# Patient Record
Sex: FEMALE | Race: WHITE | URBAN - METROPOLITAN AREA
[De-identification: names, ages, dates, MRNs, and addresses within clinical notes are randomized per-mention and may not be internally consistent; named-entity substitution may affect disease eponyms.]

---

## 2018-10-03 ENCOUNTER — HOSPITAL ENCOUNTER (OUTPATIENT)
Dept: LAB | Age: 38
Discharge: HOME | End: 2018-10-03
Payer: COMMERCIAL

## 2018-10-03 PROCEDURE — 88311 DECALCIFY TISSUE: CPT | Performed by: OTOLARYNGOLOGY

## 2018-10-03 PROCEDURE — 88305 TISSUE EXAM BY PATHOLOGIST: CPT | Performed by: OTOLARYNGOLOGY

## 2024-06-01 ENCOUNTER — HOSPITAL ENCOUNTER (INPATIENT)
Facility: CLINIC | Age: 44
LOS: 7 days | Discharge: HOME | DRG: 871 | End: 2024-06-08
Attending: EMERGENCY MEDICINE | Admitting: INTERNAL MEDICINE

## 2024-06-01 ENCOUNTER — APPOINTMENT (OUTPATIENT)
Dept: CT IMAGING | Facility: CLINIC | Age: 44
DRG: 871 | End: 2024-06-01
Attending: EMERGENCY MEDICINE

## 2024-06-01 ENCOUNTER — APPOINTMENT (OUTPATIENT)
Dept: ULTRASOUND IMAGING | Facility: CLINIC | Age: 44
DRG: 871 | End: 2024-06-01

## 2024-06-01 DIAGNOSIS — G89.29 CHRONIC ABDOMINAL PAIN: ICD-10-CM

## 2024-06-01 DIAGNOSIS — B37.31 CANDIDAL VULVOVAGINITIS: ICD-10-CM

## 2024-06-01 DIAGNOSIS — N39.0 UPPER URINARY TRACT INFECTION: ICD-10-CM

## 2024-06-01 DIAGNOSIS — A41.9 SEPTIC SHOCK (H): ICD-10-CM

## 2024-06-01 DIAGNOSIS — B96.89 BACTERIAL VAGINITIS: ICD-10-CM

## 2024-06-01 DIAGNOSIS — F32.A DEPRESSION, UNSPECIFIED DEPRESSION TYPE: ICD-10-CM

## 2024-06-01 DIAGNOSIS — R10.84 ABDOMINAL PAIN, GENERALIZED: ICD-10-CM

## 2024-06-01 DIAGNOSIS — K59.09 CHRONIC CONSTIPATION: Primary | ICD-10-CM

## 2024-06-01 DIAGNOSIS — F41.9 ANXIETY: ICD-10-CM

## 2024-06-01 DIAGNOSIS — R65.21 SEPSIS DUE TO ESCHERICHIA COLI WITH ACUTE RENAL FAILURE AND SEPTIC SHOCK, UNSPECIFIED ACUTE RENAL FAILURE TYPE (H): ICD-10-CM

## 2024-06-01 DIAGNOSIS — R10.9 CHRONIC ABDOMINAL PAIN: ICD-10-CM

## 2024-06-01 DIAGNOSIS — R65.21 SEPTIC SHOCK (H): ICD-10-CM

## 2024-06-01 DIAGNOSIS — F43.10 PTSD (POST-TRAUMATIC STRESS DISORDER): ICD-10-CM

## 2024-06-01 DIAGNOSIS — N17.9 AKI (ACUTE KIDNEY INJURY) (H): ICD-10-CM

## 2024-06-01 DIAGNOSIS — R93.3 ABNORMAL CT SCAN, GASTROINTESTINAL TRACT: ICD-10-CM

## 2024-06-01 DIAGNOSIS — J45.20 MILD INTERMITTENT ASTHMA WITHOUT COMPLICATION: ICD-10-CM

## 2024-06-01 DIAGNOSIS — K64.4 EXTERNAL HEMORRHOIDS: ICD-10-CM

## 2024-06-01 DIAGNOSIS — A41.51 SEPSIS DUE TO ESCHERICHIA COLI WITH ACUTE RENAL FAILURE AND SEPTIC SHOCK, UNSPECIFIED ACUTE RENAL FAILURE TYPE (H): ICD-10-CM

## 2024-06-01 DIAGNOSIS — N76.0 BACTERIAL VAGINITIS: ICD-10-CM

## 2024-06-01 DIAGNOSIS — N17.9 SEPSIS DUE TO ESCHERICHIA COLI WITH ACUTE RENAL FAILURE AND SEPTIC SHOCK, UNSPECIFIED ACUTE RENAL FAILURE TYPE (H): ICD-10-CM

## 2024-06-01 LAB
ALBUMIN SERPL BCG-MCNC: 3 G/DL (ref 3.5–5.2)
ALBUMIN SERPL BCG-MCNC: 3.2 G/DL (ref 3.5–5.2)
ALBUMIN UR-MCNC: 100 MG/DL
ALLEN'S TEST: ABNORMAL
ALP SERPL-CCNC: 64 U/L (ref 40–150)
ALP SERPL-CCNC: 87 U/L (ref 40–150)
ALT SERPL W P-5'-P-CCNC: 66 U/L (ref 0–50)
ALT SERPL W P-5'-P-CCNC: 70 U/L (ref 0–50)
ANION GAP SERPL CALCULATED.3IONS-SCNC: 11 MMOL/L (ref 7–15)
ANION GAP SERPL CALCULATED.3IONS-SCNC: 11 MMOL/L (ref 7–15)
APPEARANCE UR: ABNORMAL
APTT PPP: 37 SECONDS (ref 22–38)
AST SERPL W P-5'-P-CCNC: 74 U/L (ref 0–45)
AST SERPL W P-5'-P-CCNC: 75 U/L (ref 0–45)
BASE EXCESS BLDA CALC-SCNC: -3.8 MMOL/L (ref -3–3)
BASE EXCESS BLDV CALC-SCNC: -2.1 MMOL/L (ref -3–3)
BASE EXCESS BLDV CALC-SCNC: -4 MMOL/L (ref -3–3)
BASE EXCESS BLDV CALC-SCNC: -4 MMOL/L (ref -3–3)
BASOPHILS # BLD AUTO: ABNORMAL 10*3/UL
BASOPHILS # BLD MANUAL: 0 10E3/UL (ref 0–0.2)
BASOPHILS NFR BLD AUTO: ABNORMAL %
BASOPHILS NFR BLD MANUAL: 0 %
BILIRUB SERPL-MCNC: 0.3 MG/DL
BILIRUB SERPL-MCNC: 0.4 MG/DL
BILIRUB UR QL STRIP: NEGATIVE
BUN SERPL-MCNC: 29.6 MG/DL (ref 6–20)
BUN SERPL-MCNC: 30.5 MG/DL (ref 6–20)
CA-I BLD-MCNC: 4.7 MG/DL (ref 4.4–5.2)
CALCIUM SERPL-MCNC: 8 MG/DL (ref 8.6–10)
CALCIUM SERPL-MCNC: 8.2 MG/DL (ref 8.6–10)
CHLORIDE SERPL-SCNC: 101 MMOL/L (ref 98–107)
CHLORIDE SERPL-SCNC: 105 MMOL/L (ref 98–107)
COHGB MFR BLD: 94.8 % (ref 96–97)
COLOR UR AUTO: YELLOW
CPB POCT: NO
CREAT BLD-MCNC: 3.2 MG/DL (ref 0.5–1)
CREAT SERPL-MCNC: 2.45 MG/DL (ref 0.51–0.95)
CREAT SERPL-MCNC: 2.61 MG/DL (ref 0.51–0.95)
CREAT SERPL-MCNC: 3.12 MG/DL (ref 0.51–0.95)
DEPRECATED HCO3 PLAS-SCNC: 18 MMOL/L (ref 22–29)
DEPRECATED HCO3 PLAS-SCNC: 21 MMOL/L (ref 22–29)
EGFRCR SERPLBLD CKD-EPI 2021: 18 ML/MIN/1.73M2
EGFRCR SERPLBLD CKD-EPI 2021: 18 ML/MIN/1.73M2
EGFRCR SERPLBLD CKD-EPI 2021: 23 ML/MIN/1.73M2
EGFRCR SERPLBLD CKD-EPI 2021: 24 ML/MIN/1.73M2
EOSINOPHIL # BLD AUTO: ABNORMAL 10*3/UL
EOSINOPHIL # BLD MANUAL: 0 10E3/UL (ref 0–0.7)
EOSINOPHIL NFR BLD AUTO: ABNORMAL %
EOSINOPHIL NFR BLD MANUAL: 0 %
ERYTHROCYTE [DISTWIDTH] IN BLOOD BY AUTOMATED COUNT: 13.4 % (ref 10–15)
GLUCOSE BLD-MCNC: 95 MG/DL (ref 70–99)
GLUCOSE BLDC GLUCOMTR-MCNC: 106 MG/DL (ref 70–99)
GLUCOSE BLDC GLUCOMTR-MCNC: 88 MG/DL (ref 70–99)
GLUCOSE BLDC GLUCOMTR-MCNC: 90 MG/DL (ref 70–99)
GLUCOSE SERPL-MCNC: 103 MG/DL (ref 70–99)
GLUCOSE SERPL-MCNC: 107 MG/DL (ref 70–99)
GLUCOSE UR STRIP-MCNC: NEGATIVE MG/DL
HCG SERPL QL: NEGATIVE
HCO3 BLD-SCNC: 20 MMOL/L (ref 21–28)
HCO3 BLDV-SCNC: 22 MMOL/L (ref 21–28)
HCO3 BLDV-SCNC: 23 MMOL/L (ref 21–28)
HCO3 BLDV-SCNC: 24 MMOL/L (ref 21–28)
HCT VFR BLD AUTO: 32.7 % (ref 35–47)
HCT VFR BLD CALC: 32 % (ref 35–47)
HGB BLD-MCNC: 10.9 G/DL (ref 11.7–15.7)
HGB BLD-MCNC: 11.1 G/DL (ref 11.7–15.7)
HGB UR QL STRIP: ABNORMAL
IMM GRANULOCYTES # BLD: ABNORMAL 10*3/UL
IMM GRANULOCYTES NFR BLD: ABNORMAL %
INR PPP: 1.47 (ref 0.85–1.15)
KETONES UR STRIP-MCNC: NEGATIVE MG/DL
LACTATE BLD-SCNC: 1.8 MMOL/L
LACTATE SERPL-SCNC: 1.9 MMOL/L (ref 0.7–2)
LEUKOCYTE ESTERASE UR QL STRIP: ABNORMAL
LIPASE SERPL-CCNC: 11 U/L (ref 13–60)
LYMPHOCYTES # BLD AUTO: ABNORMAL 10*3/UL
LYMPHOCYTES # BLD MANUAL: 0 10E3/UL (ref 0.8–5.3)
LYMPHOCYTES NFR BLD AUTO: ABNORMAL %
LYMPHOCYTES NFR BLD MANUAL: 0 %
MCH RBC QN AUTO: 30.6 PG (ref 26.5–33)
MCHC RBC AUTO-ENTMCNC: 33.9 G/DL (ref 31.5–36.5)
MCV RBC AUTO: 90 FL (ref 78–100)
MONOCYTES # BLD AUTO: ABNORMAL 10*3/UL
MONOCYTES # BLD MANUAL: 1.2 10E3/UL (ref 0–1.3)
MONOCYTES NFR BLD AUTO: ABNORMAL %
MONOCYTES NFR BLD MANUAL: 4 %
MRSA DNA SPEC QL NAA+PROBE: NEGATIVE
NEUTROPHILS # BLD AUTO: ABNORMAL 10*3/UL
NEUTROPHILS # BLD MANUAL: 28.4 10E3/UL (ref 1.6–8.3)
NEUTROPHILS NFR BLD AUTO: ABNORMAL %
NEUTROPHILS NFR BLD MANUAL: 96 %
NITRATE UR QL: NEGATIVE
NRBC # BLD AUTO: 0 10E3/UL
NRBC BLD AUTO-RTO: 0 /100
NT-PROBNP SERPL-MCNC: 1356 PG/ML (ref 0–450)
O2/TOTAL GAS SETTING VFR VENT: 2 %
O2/TOTAL GAS SETTING VFR VENT: 21 %
OXYHGB MFR BLDV: 42 % (ref 70–75)
PCO2 BLD: 32 MM HG (ref 35–45)
PCO2 BLDV: 43 MM HG (ref 40–50)
PCO2 BLDV: 46 MM HG (ref 40–50)
PCO2 BLDV: 47 MM HG (ref 40–50)
PH BLD: 7.4 [PH] (ref 7.35–7.45)
PH BLDV: 7.29 [PH] (ref 7.32–7.43)
PH BLDV: 7.31 [PH] (ref 7.32–7.43)
PH BLDV: 7.33 [PH] (ref 7.32–7.43)
PH UR STRIP: 6 [PH] (ref 5–7)
PLAT MORPH BLD: ABNORMAL
PLATELET # BLD AUTO: 142 10E3/UL (ref 150–450)
PO2 BLD: 67 MM HG (ref 80–105)
PO2 BLDV: 25 MM HG (ref 25–47)
PO2 BLDV: 25 MM HG (ref 25–47)
PO2 BLDV: 26 MM HG (ref 25–47)
POTASSIUM BLD-SCNC: 3.9 MMOL/L (ref 3.4–5.3)
POTASSIUM SERPL-SCNC: 3.9 MMOL/L (ref 3.4–5.3)
POTASSIUM SERPL-SCNC: 4.2 MMOL/L (ref 3.4–5.3)
PROCALCITONIN SERPL IA-MCNC: 84.7 NG/ML
PROT SERPL-MCNC: 5.1 G/DL (ref 6.4–8.3)
PROT SERPL-MCNC: 5.4 G/DL (ref 6.4–8.3)
RBC # BLD AUTO: 3.63 10E6/UL (ref 3.8–5.2)
RBC MORPH BLD: ABNORMAL
RBC URINE: 16 /HPF
SA TARGET DNA: NEGATIVE
SAO2 % BLDA: 93 % (ref 92–100)
SAO2 % BLDV: 39 % (ref 70–75)
SAO2 % BLDV: 42 % (ref 70–75)
SAO2 % BLDV: 42.9 % (ref 70–75)
SODIUM BLD-SCNC: 134 MMOL/L (ref 135–145)
SODIUM SERPL-SCNC: 133 MMOL/L (ref 135–145)
SODIUM SERPL-SCNC: 134 MMOL/L (ref 135–145)
SP GR UR STRIP: 1.01 (ref 1–1.03)
SQUAMOUS EPITHELIAL: 5 /HPF
TRANSITIONAL EPI: 1 /HPF
TROPONIN T BLD-MCNC: 0.01 UG/L
TROPONIN T SERPL HS-MCNC: 10 NG/L
UROBILINOGEN UR STRIP-MCNC: NORMAL MG/DL
WBC # BLD AUTO: 29.6 10E3/UL (ref 4–11)
WBC URINE: 63 /HPF

## 2024-06-01 PROCEDURE — 84155 ASSAY OF PROTEIN SERUM: CPT

## 2024-06-01 PROCEDURE — 82805 BLOOD GASES W/O2 SATURATION: CPT | Performed by: EMERGENCY MEDICINE

## 2024-06-01 PROCEDURE — 04HY32Z INSERTION OF MONITORING DEVICE INTO LOWER ARTERY, PERCUTANEOUS APPROACH: ICD-10-PCS | Performed by: EMERGENCY MEDICINE

## 2024-06-01 PROCEDURE — 250N000013 HC RX MED GY IP 250 OP 250 PS 637

## 2024-06-01 PROCEDURE — 86704 HEP B CORE ANTIBODY TOTAL: CPT

## 2024-06-01 PROCEDURE — 82565 ASSAY OF CREATININE: CPT

## 2024-06-01 PROCEDURE — 76830 TRANSVAGINAL US NON-OB: CPT

## 2024-06-01 PROCEDURE — 258N000003 HC RX IP 258 OP 636: Performed by: EMERGENCY MEDICINE

## 2024-06-01 PROCEDURE — 76856 US EXAM PELVIC COMPLETE: CPT

## 2024-06-01 PROCEDURE — 36556 INSERT NON-TUNNEL CV CATH: CPT | Mod: 59 | Performed by: EMERGENCY MEDICINE

## 2024-06-01 PROCEDURE — 76705 ECHO EXAM OF ABDOMEN: CPT | Mod: 26 | Performed by: EMERGENCY MEDICINE

## 2024-06-01 PROCEDURE — 81001 URINALYSIS AUTO W/SCOPE: CPT | Performed by: EMERGENCY MEDICINE

## 2024-06-01 PROCEDURE — 250N000009 HC RX 250: Performed by: EMERGENCY MEDICINE

## 2024-06-01 PROCEDURE — 74176 CT ABD & PELVIS W/O CONTRAST: CPT

## 2024-06-01 PROCEDURE — 3E043XZ INTRODUCTION OF VASOPRESSOR INTO CENTRAL VEIN, PERCUTANEOUS APPROACH: ICD-10-PCS | Performed by: EMERGENCY MEDICINE

## 2024-06-01 PROCEDURE — 99291 CRITICAL CARE FIRST HOUR: CPT | Mod: GC | Performed by: INTERNAL MEDICINE

## 2024-06-01 PROCEDURE — 87086 URINE CULTURE/COLONY COUNT: CPT | Performed by: EMERGENCY MEDICINE

## 2024-06-01 PROCEDURE — 85730 THROMBOPLASTIN TIME PARTIAL: CPT | Performed by: EMERGENCY MEDICINE

## 2024-06-01 PROCEDURE — 84484 ASSAY OF TROPONIN QUANT: CPT

## 2024-06-01 PROCEDURE — 250N000011 HC RX IP 250 OP 636: Performed by: EMERGENCY MEDICINE

## 2024-06-01 PROCEDURE — 84703 CHORIONIC GONADOTROPIN ASSAY: CPT

## 2024-06-01 PROCEDURE — 36620 INSERTION CATHETER ARTERY: CPT | Performed by: EMERGENCY MEDICINE

## 2024-06-01 PROCEDURE — 86706 HEP B SURFACE ANTIBODY: CPT

## 2024-06-01 PROCEDURE — 76830 TRANSVAGINAL US NON-OB: CPT | Mod: 26 | Performed by: RADIOLOGY

## 2024-06-01 PROCEDURE — 250N000011 HC RX IP 250 OP 636: Mod: JZ

## 2024-06-01 PROCEDURE — 85007 BL SMEAR W/DIFF WBC COUNT: CPT | Performed by: EMERGENCY MEDICINE

## 2024-06-01 PROCEDURE — 99253 IP/OBS CNSLTJ NEW/EST LOW 45: CPT | Mod: 4UV | Performed by: SURGERY

## 2024-06-01 PROCEDURE — 99292 CRITICAL CARE ADDL 30 MIN: CPT | Performed by: EMERGENCY MEDICINE

## 2024-06-01 PROCEDURE — 86780 TREPONEMA PALLIDUM: CPT

## 2024-06-01 PROCEDURE — 87186 SC STD MICRODIL/AGAR DIL: CPT | Performed by: EMERGENCY MEDICINE

## 2024-06-01 PROCEDURE — 200N000002 HC R&B ICU UMMC

## 2024-06-01 PROCEDURE — 99291 CRITICAL CARE FIRST HOUR: CPT | Mod: 25 | Performed by: EMERGENCY MEDICINE

## 2024-06-01 PROCEDURE — 999N000128 HC STATISTIC PERIPHERAL IV START W/O US GUIDANCE

## 2024-06-01 PROCEDURE — 76856 US EXAM PELVIC COMPLETE: CPT | Mod: 26 | Performed by: RADIOLOGY

## 2024-06-01 PROCEDURE — 93005 ELECTROCARDIOGRAM TRACING: CPT | Performed by: EMERGENCY MEDICINE

## 2024-06-01 PROCEDURE — 83605 ASSAY OF LACTIC ACID: CPT | Performed by: EMERGENCY MEDICINE

## 2024-06-01 PROCEDURE — 83690 ASSAY OF LIPASE: CPT | Performed by: EMERGENCY MEDICINE

## 2024-06-01 PROCEDURE — 85027 COMPLETE CBC AUTOMATED: CPT | Performed by: EMERGENCY MEDICINE

## 2024-06-01 PROCEDURE — 85610 PROTHROMBIN TIME: CPT | Performed by: EMERGENCY MEDICINE

## 2024-06-01 PROCEDURE — 36415 COLL VENOUS BLD VENIPUNCTURE: CPT | Performed by: EMERGENCY MEDICINE

## 2024-06-01 PROCEDURE — 82803 BLOOD GASES ANY COMBINATION: CPT

## 2024-06-01 PROCEDURE — 74176 CT ABD & PELVIS W/O CONTRAST: CPT | Mod: 26 | Performed by: RADIOLOGY

## 2024-06-01 PROCEDURE — 84484 ASSAY OF TROPONIN QUANT: CPT | Performed by: EMERGENCY MEDICINE

## 2024-06-01 PROCEDURE — 84145 PROCALCITONIN (PCT): CPT | Performed by: EMERGENCY MEDICINE

## 2024-06-01 PROCEDURE — 82962 GLUCOSE BLOOD TEST: CPT

## 2024-06-01 PROCEDURE — 76705 ECHO EXAM OF ABDOMEN: CPT | Performed by: EMERGENCY MEDICINE

## 2024-06-01 PROCEDURE — 82947 ASSAY GLUCOSE BLOOD QUANT: CPT | Performed by: EMERGENCY MEDICINE

## 2024-06-01 PROCEDURE — 87149 DNA/RNA DIRECT PROBE: CPT | Performed by: EMERGENCY MEDICINE

## 2024-06-01 PROCEDURE — 83880 ASSAY OF NATRIURETIC PEPTIDE: CPT | Performed by: EMERGENCY MEDICINE

## 2024-06-01 PROCEDURE — 93010 ELECTROCARDIOGRAM REPORT: CPT | Mod: 59 | Performed by: EMERGENCY MEDICINE

## 2024-06-01 PROCEDURE — 87641 MR-STAPH DNA AMP PROBE: CPT

## 2024-06-01 PROCEDURE — 82330 ASSAY OF CALCIUM: CPT

## 2024-06-01 RX ORDER — POLYETHYLENE GLYCOL 3350 17 G/17G
17 POWDER, FOR SOLUTION ORAL DAILY PRN
Status: DISCONTINUED | OUTPATIENT
Start: 2024-06-01 | End: 2024-06-03

## 2024-06-01 RX ORDER — AMOXICILLIN 250 MG
2 CAPSULE ORAL 2 TIMES DAILY PRN
Status: DISCONTINUED | OUTPATIENT
Start: 2024-06-01 | End: 2024-06-03

## 2024-06-01 RX ORDER — VANCOMYCIN HYDROCHLORIDE 125 MG/1
125 CAPSULE ORAL 4 TIMES DAILY
Status: DISCONTINUED | OUTPATIENT
Start: 2024-06-01 | End: 2024-06-02

## 2024-06-01 RX ORDER — PIPERACILLIN SODIUM, TAZOBACTAM SODIUM 2; .25 G/10ML; G/10ML
2.25 INJECTION, POWDER, LYOPHILIZED, FOR SOLUTION INTRAVENOUS EVERY 6 HOURS
Status: DISCONTINUED | OUTPATIENT
Start: 2024-06-01 | End: 2024-06-01

## 2024-06-01 RX ORDER — ENOXAPARIN SODIUM 100 MG/ML
30 INJECTION SUBCUTANEOUS EVERY 24 HOURS
Status: DISCONTINUED | OUTPATIENT
Start: 2024-06-01 | End: 2024-06-01

## 2024-06-01 RX ORDER — LORAZEPAM 2 MG/ML
2 INJECTION INTRAMUSCULAR EVERY 8 HOURS PRN
Status: DISCONTINUED | OUTPATIENT
Start: 2024-06-01 | End: 2024-06-06

## 2024-06-01 RX ORDER — ACETAMINOPHEN 325 MG/1
975 TABLET ORAL EVERY 8 HOURS PRN
Status: DISCONTINUED | OUTPATIENT
Start: 2024-06-01 | End: 2024-06-03

## 2024-06-01 RX ORDER — NALOXONE HYDROCHLORIDE 0.4 MG/ML
0.4 INJECTION, SOLUTION INTRAMUSCULAR; INTRAVENOUS; SUBCUTANEOUS
Status: DISCONTINUED | OUTPATIENT
Start: 2024-06-01 | End: 2024-06-08 | Stop reason: HOSPADM

## 2024-06-01 RX ORDER — NOREPINEPHRINE BITARTRATE 0.06 MG/ML
.01-.6 INJECTION, SOLUTION INTRAVENOUS CONTINUOUS
Status: DISCONTINUED | OUTPATIENT
Start: 2024-06-01 | End: 2024-06-01

## 2024-06-01 RX ORDER — PIPERACILLIN SODIUM, TAZOBACTAM SODIUM 4; .5 G/20ML; G/20ML
4.5 INJECTION, POWDER, LYOPHILIZED, FOR SOLUTION INTRAVENOUS ONCE
Status: COMPLETED | OUTPATIENT
Start: 2024-06-01 | End: 2024-06-01

## 2024-06-01 RX ORDER — NALOXONE HYDROCHLORIDE 0.4 MG/ML
0.2 INJECTION, SOLUTION INTRAMUSCULAR; INTRAVENOUS; SUBCUTANEOUS
Status: DISCONTINUED | OUTPATIENT
Start: 2024-06-01 | End: 2024-06-08 | Stop reason: HOSPADM

## 2024-06-01 RX ORDER — ALBUTEROL SULFATE 90 UG/1
2 AEROSOL, METERED RESPIRATORY (INHALATION) EVERY 6 HOURS PRN
Status: DISCONTINUED | OUTPATIENT
Start: 2024-06-01 | End: 2024-06-08 | Stop reason: HOSPADM

## 2024-06-01 RX ORDER — LIDOCAINE 4 G/G
1 PATCH TOPICAL
Status: DISCONTINUED | OUTPATIENT
Start: 2024-06-01 | End: 2024-06-08 | Stop reason: HOSPADM

## 2024-06-01 RX ORDER — HEPARIN SODIUM 5000 [USP'U]/.5ML
5000 INJECTION, SOLUTION INTRAVENOUS; SUBCUTANEOUS EVERY 8 HOURS
Status: DISCONTINUED | OUTPATIENT
Start: 2024-06-01 | End: 2024-06-06

## 2024-06-01 RX ORDER — NICOTINE POLACRILEX 4 MG
15-30 LOZENGE BUCCAL
Status: DISCONTINUED | OUTPATIENT
Start: 2024-06-01 | End: 2024-06-08 | Stop reason: HOSPADM

## 2024-06-01 RX ORDER — PIPERACILLIN SODIUM, TAZOBACTAM SODIUM 2; .25 G/10ML; G/10ML
2.25 INJECTION, POWDER, LYOPHILIZED, FOR SOLUTION INTRAVENOUS EVERY 6 HOURS
Status: DISCONTINUED | OUTPATIENT
Start: 2024-06-01 | End: 2024-06-02

## 2024-06-01 RX ORDER — ACETAMINOPHEN 500 MG
1000 TABLET ORAL ONCE
Status: COMPLETED | OUTPATIENT
Start: 2024-06-01 | End: 2024-06-01

## 2024-06-01 RX ORDER — AMOXICILLIN 250 MG
1 CAPSULE ORAL 2 TIMES DAILY PRN
Status: DISCONTINUED | OUTPATIENT
Start: 2024-06-01 | End: 2024-06-03

## 2024-06-01 RX ORDER — DEXTROSE MONOHYDRATE 25 G/50ML
25-50 INJECTION, SOLUTION INTRAVENOUS
Status: DISCONTINUED | OUTPATIENT
Start: 2024-06-01 | End: 2024-06-08 | Stop reason: HOSPADM

## 2024-06-01 RX ORDER — HYDROMORPHONE HCL IN WATER/PF 6 MG/30 ML
.2-.5 PATIENT CONTROLLED ANALGESIA SYRINGE INTRAVENOUS
Status: DISCONTINUED | OUTPATIENT
Start: 2024-06-01 | End: 2024-06-03

## 2024-06-01 RX ORDER — METRONIDAZOLE 500 MG/100ML
500 INJECTION, SOLUTION INTRAVENOUS EVERY 12 HOURS
Status: DISCONTINUED | OUTPATIENT
Start: 2024-06-01 | End: 2024-06-02

## 2024-06-01 RX ADMIN — HYDROMORPHONE HYDROCHLORIDE 0.2 MG: 0.2 INJECTION, SOLUTION INTRAMUSCULAR; INTRAVENOUS; SUBCUTANEOUS at 22:34

## 2024-06-01 RX ADMIN — SODIUM CHLORIDE 1000 ML: 9 INJECTION, SOLUTION INTRAVENOUS at 12:22

## 2024-06-01 RX ADMIN — PIPERACILLIN SODIUM AND TAZOBACTAM SODIUM 2.25 G: 2; .25 INJECTION, POWDER, LYOPHILIZED, FOR SOLUTION INTRAVENOUS at 18:46

## 2024-06-01 RX ADMIN — HEPARIN SODIUM 5000 UNITS: 5000 INJECTION, SOLUTION INTRAVENOUS; SUBCUTANEOUS at 17:40

## 2024-06-01 RX ADMIN — HEPARIN SODIUM 5000 UNITS: 5000 INJECTION, SOLUTION INTRAVENOUS; SUBCUTANEOUS at 23:57

## 2024-06-01 RX ADMIN — PIPERACILLIN AND TAZOBACTAM 4.5 G: 4; .5 INJECTION, POWDER, LYOPHILIZED, FOR SOLUTION INTRAVENOUS at 12:16

## 2024-06-01 RX ADMIN — VANCOMYCIN HYDROCHLORIDE 125 MG: 125 CAPSULE ORAL at 17:43

## 2024-06-01 RX ADMIN — PIPERACILLIN SODIUM AND TAZOBACTAM SODIUM 2.25 G: 2; .25 INJECTION, POWDER, LYOPHILIZED, FOR SOLUTION INTRAVENOUS at 23:55

## 2024-06-01 RX ADMIN — LIDOCAINE 4% 1 PATCH: 40 PATCH TOPICAL at 22:31

## 2024-06-01 RX ADMIN — SODIUM CHLORIDE, POTASSIUM CHLORIDE, SODIUM LACTATE AND CALCIUM CHLORIDE 1836 ML: 600; 310; 30; 20 INJECTION, SOLUTION INTRAVENOUS at 12:15

## 2024-06-01 RX ADMIN — VANCOMYCIN HYDROCHLORIDE 125 MG: 125 CAPSULE ORAL at 21:35

## 2024-06-01 RX ADMIN — METRONIDAZOLE 500 MG: 500 INJECTION, SOLUTION INTRAVENOUS at 17:35

## 2024-06-01 RX ADMIN — NOREPINEPHRINE BITARTRATE 0.03 MCG/KG/MIN: 1 SOLUTION INTRAVENOUS at 13:35

## 2024-06-01 RX ADMIN — VANCOMYCIN HYDROCHLORIDE 1250 MG: 10 INJECTION, POWDER, LYOPHILIZED, FOR SOLUTION INTRAVENOUS at 12:26

## 2024-06-01 ASSESSMENT — ACTIVITIES OF DAILY LIVING (ADL)
ADLS_ACUITY_SCORE: 37
ADLS_ACUITY_SCORE: 35
ADLS_ACUITY_SCORE: 37
ADLS_ACUITY_SCORE: 35
ADLS_ACUITY_SCORE: 37
ADLS_ACUITY_SCORE: 37
ADLS_ACUITY_SCORE: 35
ADLS_ACUITY_SCORE: 37

## 2024-06-01 ASSESSMENT — COLUMBIA-SUICIDE SEVERITY RATING SCALE - C-SSRS
1. IN THE PAST MONTH, HAVE YOU WISHED YOU WERE DEAD OR WISHED YOU COULD GO TO SLEEP AND NOT WAKE UP?: NO
2. HAVE YOU ACTUALLY HAD ANY THOUGHTS OF KILLING YOURSELF IN THE PAST MONTH?: NO
6. HAVE YOU EVER DONE ANYTHING, STARTED TO DO ANYTHING, OR PREPARED TO DO ANYTHING TO END YOUR LIFE?: NO

## 2024-06-01 NOTE — PROGRESS NOTES
Admitted/transferred from: [facility]   Reason for admission/transfer: hypotension   Patient status upon admission/transfer: peripheral levophed infusing, lethargic, A & Ox4, pain in abdomen, denies nausea   Interventions: ultrasound complete   Plan: titrating down levo   2 RN skin assessment: completed by Erich, no skin concerns noted   Sexual Orientation and Gender Identity (SOGI) smartfom completed: Done/Not Done  Result of skin assessment and interventions/actions: no skin concerns noted   Height, weight, drug calc weight: Done-   Patient belongings (see Flowsheet - Adult Profile for details): multiple bags of clothing, phone and ring in place   MDRO education (if applicable):  n/a       ICU End of Shift Summary. See flowsheets for vital signs and detailed assessment.    Changes this shift: see flowsheets/ MAR. Lethargic/ somnolent. 2L NC. Sr/ST. MAP >65, levo infusing.     Plan:  Continue with plan of care and notify care team of nay changes.     Need Stool sample

## 2024-06-01 NOTE — PHARMACY-VANCOMYCIN DOSING SERVICE
Pharmacy Vancomycin Initial Note  Date of Service 2024  Patient's  1980  43 year old, female    Indication: Sepsis    Current estimated CrCl = Estimated Creatinine Clearance: 22.1 mL/min (A) (based on SCr of 3.2 mg/dL (H)).    Creatinine for last 3 days  2024: 12:01 PM Creatinine 3.12 mg/dL; 12:11 PM Creatinine POCT 3.2 mg/dL    Recent Vancomycin Level(s) for last 3 days  No results found for requested labs within last 3 days.      Vancomycin IV Administrations (past 72 hours)                     vancomycin (VANCOCIN) 1,250 mg in 0.9% NaCl 250 mL intermittent infusion (mg) 1,250 mg New Bag 24 1226                    Nephrotoxins and other renal medications (From now, onward)      Start     Dose/Rate Route Frequency Ordered Stop    24 1417  vancomycin place sanabria - receiving intermittent dosing         1 each Intravenous SEE ADMIN INSTRUCTIONS 24 1418      24 1345  norepinephrine (LEVOPHED) 4 mg/250 mL NS infusion ADULT (PERIPHERAL)         0.03-0.125 mcg/kg/min × 61.2 kg (Dosing Weight)  6.9-28.7 mL/hr  Intravenous CONTINUOUS 24 1323              Contrast Orders - past 72 hours (72h ago, onward)      None                Plan:  Start vancomycin  1250 mg IV x1. Then dose by levels   Vancomycin monitoring method: Trough (Method 2 = manual dose calculation)  Vancomycin therapeutic monitoring goal: 15-20 mg/L  Pharmacy will check vancomycin levels as appropriate in 1-3 Days.    Serum creatinine levels will be ordered daily for the first week of therapy and at least twice weekly for subsequent weeks.      Eri Wells, PharmD, BCEMP, BCPS

## 2024-06-01 NOTE — ED NOTES
Bed: ED20  Expected date: 6/1/24  Expected time: 11:39 AM  Means of arrival: Ambulance  Comments:  N 721  43F  Hypotension, ab pain, and back pain

## 2024-06-01 NOTE — ED TRIAGE NOTES
BIBA from  c/o abdominal pain that radiated through to the back pain with hypotension. Has had pain for 2 weeks but worse the last 2 days   BP 80s MAP 62 for EMS   Unremarkable EKG   Had a syncopal episode yesterday fell backwards.   EMS gave 1L of NS

## 2024-06-01 NOTE — H&P
[facility]    History and Physical  [facility] Intensive Care     Date of Admission:  6/1/2024    Assessment & Plan   Alisha Crowe is a 43 year old female with Hx of asthma, MDD, ADHD, PTSD, chronic pelvic pain (previously on chronic oxycodone) who transferred from urgent care due to hypotension.    ------------------Neurology:------------------  # MDD  # PTSD  # ADHD  - used to be on bupropion, mirtazapine -- not taking due to finance  - PT ativan 2 mg TID prn -- will continue for now, need to discuss chronic benzo use as outpatient    # Pain control  - acetaminophen 975 TID prn  - dilaudid prn    ------------------Cardiovascular:------------------  # Shock, likely septic  Persistent hypotensive despite 4 L of fluids. Differential includes hypovolemia vs septic vs cardiogenic.Met SIRS criteria with tachycardia and leukocytosis. High procal, normal lactate. Likely source of infection is colitis. Also has narrow pulse pressure thus unclear if cardiogenic shock is contributing.  - fluids: s/p NSS 1000 mL LR 1836 mL  - pressors: norepinephrine gtt via peripheral -- on very low dose, will reevaluate tomorrow if CVC is needed  - antibiotics and infectious work up as below  - TTE    ------------------Pulmonary:------------------  - no acute concern, on room air    # Asthma  - on albuterol    ------------------Renal------------------  # YOLANDA  Cr 3.12 on admission (baseline 1 4/2024). Likely from prerenal YOLANDA from sepsis  - IV fluids as above  - monitor Cr      ------------------ID: ------------------  # Septic shock  - as discussed above  - will empiric treat C difficle    Antibiotics  - Vanc 6/1-dc'd  - Zosyn 6/1 -   - po vanc 6/1 -  - IV metrodinazole 6/1 -     Infectious workup  - blood cultures  - C difficle pcr  - MRSA nares  - TVS    ------------------GI/Nutrition------------------    - regular diet    # Abdominal pain  # Colitis  CT abdomen/pelvis with colitis  involving the cecum and ascending colon. Differential considerations include  infectious, inflammatory or possible ischemic etiology. However, not able to rule out ovarian torsion  - Transvaginal ultrasound    # Constipation  - senna, miralax    ------------------Endocrine:------------------  - no acute issue    ------------------Heme/Onc:------------------  # Leukocytosis  WBC 29k. Likely due to leukemoid reaction  - monitor    DVT Prophylaxis: Heparin SQ  GI Prophylaxis: Not indicated    Restraints: Restraints for medical healing needed: NO    Family update by me today: No    Rik Galloway MD  PGY-2 Internal Medicine  MICU-1 service      Code Status   Full Code    Primary Care Physician   [facility]    =================================================================================  Chief Complaint   Hypotension    History is obtained from the patient    History of Present Illness   Alisha Crowe is a 43 year old female with Hx of asthma, MDD, ADHD, PTSD, chronic pelvic pain (previously on chronic oxycodone) who transferred from urgent care due to hypotension.    Patient complained of lower abdominal pain for 2 weeks radiating to her back. Initially it was intermittent and became constant. She reports chills and severe lightheadedness that she fell down (no head trauma). Denies diarrhea, dysuria, vaginal discharge, joint pain, rash, cough. Reports shortness of breath on exertion but denies PND, orthopnea. She states that she has been eating ok.    Regarding her other medical condition, she was diagnosed with endometriosis and had been on oxycodone at age 13 to 31+. She did not refill it anymore because of the finance issue. The only medication that she's on is ativan 2 mg daily for the past 2-3 years for PTSD.    ED course  VS T 98.7 HR  MAP 60-70 on levo 0.04  Labs WBC 29.6 N 96% bicarb 21 gap 11 lactate 1.9 Cr 3.12 (baseline 1 4/2024) procal 84.7 NTproBNP 1356  VBG pH 7.3  CO2 43   Blood cultures obtained  CT abdomen/pelvis with colitis involving the cecum and ascending colon.  Treatment: Vanc, Zosyn, NSS 1000 mL LR 1836 mL    Past Medical History    As above    Past Surgical History   Cholecystectomy  Tubal ligation    Prior to Admission Medications   None     Allergies   No Known Allergies    Social History   I have reviewed this patient's social history and updated it with pertinent information if needed. Alisha Crowe      Family History   I have reviewed this patient's family history and updated it with pertinent information if needed.   No family history on file.    Review of Systems   The 5 point Review of Systems is negative other than noted in the HPI or here.    Physical Exam   Temp: 98.7  F (37.1  C) Temp src: Oral Temp  Min: 98.7  F (37.1  C)  Max: 98.7  F (37.1  C) BP: (!) 87/64 Pulse: 103   Resp: 16 SpO2: 98 % O2 Device: None (Room air)    Vital Signs with Ranges  Temp:  [98.7  F (37.1  C)] 98.7  F (37.1  C)  Pulse:  [] 103  Resp:  [16] 16  BP: ()/() 87/64  MAP:  [73 mmHg] 73 mmHg  Arterial Line BP: (84)/(66) 84/66  SpO2:  [93 %-100 %] 98 %  135 lbs 0 oz    Constitutional: Awake, alert, cooperative, no apparent distress, and appears stated age.  Eyes: Lids and lashes normal, pupils equal, round and reactive to light, extra ocular muscles intact, sclera clear, conjunctiva normal.  ENT: Normocephalic, without obvious abnormality, atraumatic, sinuses nontender on palpation, external ears without lesions, oral pharynx with moist mucus membranes, tonsils without erythema or exudates, gums normal and good dentition.  Respiratory: No increased work of breathing, good air exchange, clear to auscultation bilaterally, no crackles or wheezing.  Cardiovascular: Normal apical impulse, regular rate and rhythm, normal S1 and S2, no S3 or S4, and no murmur noted.  GI: No scars, normal bowel sounds, soft, non-distended, non-tender, no masses palpated, no  hepatosplenomegaly.  Lymph/Hematologic: No cervical lymphadenopathy and no supraclavicular lymphadenopathy.  Skin: No bruising or bleeding, normal skin color, texture, turgor, no redness, warmth, or swelling, no rashes, no lesions, no abnormal moles, nails normal without discoloration or clubbing and no jaundice.  Musculoskeletal: There is no redness, warmth, or swelling of the joints.  Full range of motion noted.  Motor strength is 5 out of 5 all extremities bilaterally.  Tone is normal.  Neurologic: Awake, alert, oriented to name, place and time.  Cranial nerves II-XII are grossly intact.  Motor is 5 out of 5 bilaterally.  Cerebellar finger to nose, heel to shin intact.  Sensory is intact.  Babinski down going, Romberg negative, and gait is normal.   Neuropsychiatric: Calm, normal eye contact, alert, normal affect, oriented to self, place, time and situation, memory for past and recent events intact and thought process normal.    Data   Results for orders placed or performed during the hospital encounter of 06/01/24 (from the past 24 hour(s))   CBC with platelets differential    Narrative    The following orders were created for panel order CBC with platelets differential.  Procedure                               Abnormality         Status                     ---------                               -----------         ------                     CBC with platelets and d...[[id number]]  Abnormal            Final result               Manual Differential[[id number]]          Abnormal            Final result                 Please view results for these tests on the individual orders.   Comprehensive metabolic panel   Result Value Ref Range    Sodium 133 (L) 135 - 145 mmol/L    Potassium 4.2 3.4 - 5.3 mmol/L    Carbon Dioxide (CO2) 21 (L) 22 - 29 mmol/L    Anion Gap 11 7 - 15 mmol/L    Urea Nitrogen 30.5 (H) 6.0 - 20.0 mg/dL    Creatinine 3.12 (H) 0.51 - 0.95 mg/dL    GFR Estimate 18 (L) >60 mL/min/1.73m2    Calcium 8.2  (L) 8.6 - 10.0 mg/dL    Chloride 101 98 - 107 mmol/L    Glucose 107 (H) 70 - 99 mg/dL    Alkaline Phosphatase 64 40 - 150 U/L    AST 75 (H) 0 - 45 U/L    ALT 70 (H) 0 - 50 U/L    Protein Total 5.4 (L) 6.4 - 8.3 g/dL    Albumin 3.2 (L) 3.5 - 5.2 g/dL    Bilirubin Total 0.3 <=1.2 mg/dL   Blood gas venous   Result Value Ref Range    pH Venous 7.33 7.32 - 7.43    pCO2 Venous 46 40 - 50 mm Hg    pO2 Venous 25 25 - 47 mm Hg    Bicarbonate Venous 24 21 - 28 mmol/L    Base Excess/Deficit Venous -2.1 -3.0 - 3.0 mmol/L    FIO2 21     Oxyhemoglobin Venous 42 (L) 70 - 75 %    O2 Sat, Venous 42.9 (L) 70.0 - 75.0 %    Narrative    In healthy individuals, oxyhemoglobin (O2Hb) and oxygen saturation (SO2) are approximately equal. In the presence of dyshemoglobins, oxyhemoglobin can be considerably lower than oxygen saturation.   Lactic Acid Whole Blood with 1X Repeat in 2 HR when >2   Result Value Ref Range    Lactic Acid, Initial 1.9 0.7 - 2.0 mmol/L   Procalcitonin   Result Value Ref Range    Procalcitonin 84.70 (HH) <0.50 ng/mL   Lipase   Result Value Ref Range    Lipase 11 (L) 13 - 60 U/L   INR   Result Value Ref Range    INR 1.47 (H) 0.85 - 1.15   Partial thromboplastin time   Result Value Ref Range    aPTT 37 22 - 38 Seconds   Troponin T, High Sensitivity   Result Value Ref Range    Troponin T, High Sensitivity 10 <=14 ng/L   Nt probnp inpatient (BNP)   Result Value Ref Range    N terminal Pro BNP Inpatient 1,356 (H) 0 - 450 pg/mL   CBC with platelets and differential   Result Value Ref Range    WBC Count 29.6 (H) 4.0 - 11.0 10e3/uL    RBC Count 3.63 (L) 3.80 - 5.20 10e6/uL    Hemoglobin 11.1 (L) 11.7 - 15.7 g/dL    Hematocrit 32.7 (L) 35.0 - 47.0 %    MCV 90 78 - 100 fL    MCH 30.6 26.5 - 33.0 pg    MCHC 33.9 31.5 - 36.5 g/dL    RDW 13.4 10.0 - 15.0 %    Platelet Count 142 (L) 150 - 450 10e3/uL    % Neutrophils      % Lymphocytes      % Monocytes      % Eosinophils      % Basophils      % Immature Granulocytes      NRBCs per  100 WBC 0 <1 /100    Absolute Neutrophils      Absolute Lymphocytes      Absolute Monocytes      Absolute Eosinophils      Absolute Basophils      Absolute Immature Granulocytes      Absolute NRBCs 0.0 10e3/uL   Manual Differential   Result Value Ref Range    % Neutrophils 96 %    % Lymphocytes 0 %    % Monocytes 4 %    % Eosinophils 0 %    % Basophils 0 %    Absolute Neutrophils 28.4 (H) 1.6 - 8.3 10e3/uL    Absolute Lymphocytes 0.0 (L) 0.8 - 5.3 10e3/uL    Absolute Monocytes 1.2 0.0 - 1.3 10e3/uL    Absolute Eosinophils 0.0 0.0 - 0.7 10e3/uL    Absolute Basophils 0.0 0.0 - 0.2 10e3/uL    RBC Morphology Confirmed RBC Indices     Platelet Assessment  Automated Count Confirmed. Platelet morphology is normal.     Automated Count Confirmed. Platelet morphology is normal.   iStat Troponin, POCT   Result Value Ref Range    TROPPC POCT 0.01 <=0.12 ug/L   iStat Gases (lactate) venous, POCT   Result Value Ref Range    Lactic Acid POCT 1.8 <=2.0 mmol/L    Bicarbonate Venous POCT 22 21 - 28 mmol/L    O2 Sat, Venous POCT 42 (L) 70 - 75 %    pCO2 Venous POCT 43 40 - 50 mm Hg    pH Venous POCT 7.31 (L) 7.32 - 7.43    pO2 Venous POCT 26 25 - 47 mm Hg    Base Excess/Deficit (+/-) POCT -4.0 (L) -3.0 - 3.0 mmol/L   POC US ABDOMEN LIMITED    Impression    Bedside FAST (Focused Assessment with Sonography in Trauma), performed and interpreted by me.   Indication: Abdominal pain and Hypotension    With the patient in Trendelenburg, the RUQ, LUQ and subxiphoid views were examined for intraabdominal and thoracic free fluid and pericardial effusion. With the patient in reverse Trendelenburg, the suprapubic view was examined for intraabdominal free fluid. Image quality was satisfactory..     Findings: There is no evidence of free fluid above or below bilateral diaphragms, in the splenorenal or hepatorenal space, or in bilateral paracolic gutters. There was no free fluid seen in the pelvis adjacent to the urinary bladder. There is no free  fluid within the pericardium.        IMPRESSION:  Negative FAST    iStat Gases Electrolytes ICA Glucose Venous, POCT   Result Value Ref Range    CPB Applied No     Hematocrit POCT 32 (L) 35 - 47 %    Calcium, Ionized Whole Blood POCT 4.7 4.4 - 5.2 mg/dL    Glucose Whole Blood POCT 95 70 - 99 mg/dL    Bicarbonate Venous POCT 23 21 - 28 mmol/L    Hemoglobin POCT 10.9 (L) 11.7 - 15.7 g/dL    Potassium POCT 3.9 3.4 - 5.3 mmol/L    Sodium POCT 134 (L) 135 - 145 mmol/L    pCO2 Venous POCT 47 40 - 50 mm Hg    pO2 Venous POCT 25 25 - 47 mm Hg    pH Venous POCT 7.29 (L) 7.32 - 7.43    O2 Sat, Venous POCT 39 (L) 70 - 75 %    Base Excess/Deficit (+/-) POCT -4.0 (L) -3.0 - 3.0 mmol/L   EKG 12-lead, tracing only   Result Value Ref Range    Systolic Blood Pressure  mmHg    Diastolic Blood Pressure  mmHg    Ventricular Rate 90 BPM    Atrial Rate 90 BPM    TN Interval 146 ms    QRS Duration 82 ms     ms    QTc 445 ms    P Axis 72 degrees    R AXIS 0 degrees    T Axis 27 degrees    Interpretation ECG Sinus rhythm  Normal ECG      Creatinine POCT   Result Value Ref Range    Creatinine POCT 3.2 (H) 0.5 - 1.0 mg/dL    GFR, ESTIMATED POCT 18 (L) >60 mL/min/1.73m2   CT Abdomen Pelvis w/o Contrast    Narrative    EXAMINATION: CT ABDOMEN PELVIS W/O CONTRAST, 6/1/2024 12:57 PM    INDICATION: hypotension, lower abd pain, jordan    COMPARISON STUDY: None    TECHNIQUE: CT scan of the abdomen and pelvis was performed on  multidetector CT scanner using volumetric acquisition technique and  images were reconstructed in multiple planes with variable thickness  and reviewed on dedicated workstations.     CONTRAST: Without contrast.    CT scan radiation dose is optimized to minimum requisite dose using  automated dose modulation techniques.    FINDINGS:    Lower thorax: Trace pleural effusions with overlying subsegmental  bibasilar atelectasis. Left infrahilar pulmonary cyst.    Liver: Enlarged measuring 18.5 cm in craniocaudal dimension. No  mass.  No intrahepatic biliary ductal dilation.    Biliary System: Status post cholecystectomy. No extrahepatic biliary  ductal dilation.    Pancreas: No pancreatic ductal dilation.    Adrenal glands: No mass or nodules    Spleen: Normal.    Kidneys: 2 mm left renal parenchymal calculus. No obstructing calculus  or hydronephrosis.    Gastrointestinal tract: Normal caliber bowel.  Edematous colonic bowel  wall thickening involving the cecum and ascending colon with adjacent  fat stranding and small amount of free fluid. The transverse and  descending colon appear unremarkable.    Mesentery/peritoneum/retroperitoneum: No free fluid or air.    Lymph nodes: No significant lymphadenopathy.    Vasculature: Nonaneurysmal abdominal aorta.    Pelvis: Urinary bladder is normal.    Osseous structures: No aggressive or acute osseous lesion.      Soft tissues: Within normal limits.      Impression    IMPRESSION:   Examination is limited due to lack of IV contrast. Within the  limitations of the examination, findings are concerning for colitis  with bowel wall thickening and adjacent inflammatory changes involving  the cecum and ascending colon. Differential considerations include  infectious, inflammatory or possible ischemic etiology given history  of hypotension.    I have personally reviewed the examination and initial interpretation  and I agree with the findings.    RICARDO BAXTER MD         SYSTEM ID:  [id number]

## 2024-06-01 NOTE — LETTER
[facility] 7C MED SURG  [address] [facility] [id number]  [phone]      2024    Alisha Crowe  [address] [facility] [address]  [phone] (home)     : 1980      To Whom it may concern:    Alisha Crowe was seen at [facility] from 24-24 for an infection requiring hospitalization. Please excuse her from work until 6/15/24 with no restrictions once she returns to work.       Sincerely,    BILLIE August CNP

## 2024-06-01 NOTE — CONSULTS
"    [facility]    Consult Note - Colorectal Surgery Service  Date of Admission:  6/1/2024  Consult Requested by: ED - Dr. Mendiola  Reason for Consult: colitis      Assessment & Plan: Surgery   Alisha Crowe is a 43 year old female with hx notable for previous episode of colitis, family hx of mother requiring total abdominal colectomy who presented to the ED with abdominal pain and septic shock. Here, she is critically ill and hypotensive. CT was reviewed showing fat stranding around the ascending colon though not significant enough to explain the extent of her critical illness. No evidence of perforation or other apparent acute intraabdominal pathology on this non contrast scan. Low suspicion for ischemic colitis at this point given CT findings and normal lactate. No indication for urgent surgical intervention.     - admit to MICU for resuscitation  - no plans for urgent surgical intervention  - recommend broad infectious workup, enteric panel  - NPO, serial abdominal exams  - broad spectrum IV antibiotics  - colorectal surgery will continue to follow       Drains: None     Code Status:  Full Code    Clinically Significant Risk Factors Present on Admission               # Coagulation Defect: INR = 1.47 (Ref range: 0.85 - 1.15) and/or PTT = 37 Seconds (Ref range: 22 - 38 Seconds), will monitor for bleeding      # Circulatory Shock: required vasopressors within past 24 hours       # Overweight: Estimated body mass index is 28.22 kg/m  as calculated from the following:    Height as of this encounter: 1.473 m (4' 10\").    Weight as of this encounter: 61.2 kg (135 lb).            The patient's care was discussed with the colorectal fellow who will discuss with staff.    Ruthann Funk MD  [facility]  Non-urgent messages: Securely message with [employer] (more info)  Text page via [facility] Paging/Directory "     ______________________________________________________________________    Chief Complaint   Abdominal pain    History is obtained from the patient and chart review    History of Present Illness   Alisha Crowe is a 43 year old female with a history notable for anxiety, depression, PTSD, chronic constipation, chronic pelvic pain, previous colitis who was transferred from Urgent Care to the ED for evaluation of abdominal pain. She says that two weeks ago while mowing the yard, she developed lower abdominal pain wrapping around to her back. The pain has come and gone since that time but yesterday became constant and severe. She has never had this pain before. She notes that she is constipated at baseline, often going 2-4 weeks between bowel movements. Last BM was yesterday, small. Denies black or bloody stools. She presented to urgent care earlier today with 10/10 abdominal pain, lightheadedness and weakness and was was noted to be hypotensive 64/29 prompting transfer to the ED.     Of note, her mother reportedly had a total abdominal colectomy for colitis of unknown etiology.   Patient reports she has had several colonoscopies with multiple biopsies, no formal diagnoses.   She has had a prior episode of colitis in 2017 with CT A/P showing colitis of the transverse and descending colon. Flex sig as below. Per notes, this was suspected to be ischemic colitis 2/2 constipation.     Per chart review:  Flex Sigmoidoscopy 12/4/2017  Findings:       The perianal and digital rectal examinations were        normal.        An area of mildly congested mucosa was found from        rectum to splenic flexure. This was biopsied with a        cold forceps for histology. Estimated blood loss was        minimal.   Impression:          - Congested mucosa from rectum to                        splenic flexure. Biopsied.       Past Medical History    Anxiety  MDD  PTSD  Colitis  Chronic constipation  Chronic pelvic pain    Past  Surgical History   Laparoscopic cholecystectomy  Lap tubal ligation    Prior to Admission Medications   I have reviewed this patient's current medications       Review of Systems    The 10 point Review of Systems is negative other than noted in the HPI or here.     Social History   I have reviewed this patient's social history and updated it with pertinent information if needed.  Lives in [place], [place]  Current smoker      Family History   Mother - total abdominal colectomy  No family hx of colorectal cancer      Allergies   No Known Allergies     Physical Exam   Vital Signs: Temp: 98.7  F (37.1  C) Temp src: Oral BP: (!) 56/24 Pulse: 100   Resp: 16 SpO2: 100 % O2 Device: None (Room air)    Weight: 135 lbs 0 ozNo intake or output data in the 24 hours ending 06/01/24 1346  General Appearance: Alert, pale, rigors, acutely ill appearing  Respiratory: slightly increased WOB  Cardiovascular: tachycardic, regular rhythm  GI: well healed laparoscopic surgical scars, soft, diffusely tender most across the lower abdomen   Skin: pale        Data   Labs and imaging reviewed    WBC 29.6  Hgb 11.1  Na 133  Creat 3.12    CT Abdomen Pelvis w/o Contrast    Result Date: 6/1/2024    IMPRESSION: Examination is limited due to lack of IV contrast. Within the limitations of the examination, findings are concerning for colitis with bowel wall thickening and adjacent inflammatory changes involving the cecum and ascending colon. Differential considerations include infectious, inflammatory or possible ischemic etiology given history of hypotension. I have personally reviewed the examination and initial interpretation and I agree with the findings. RICARDO BAXTER MD   SYSTEM ID:  [id number]

## 2024-06-01 NOTE — PROGRESS NOTES
Critical Care Services Progress Note:  I personally examined and evaluated the patient today. I formulated today s plan with the house staff team or resident(s) and agree with the findings and plan in the associated note (see separately attested resident note).   I have evaluated all laboratory values and imaging studies from the past 24 hours.  Summary of hospital course:  43F pmh of MDD, anxiety now presented from urgent care for abdominal pain and hypotension  Persistently hypotensive despite 4L crystalloid in the ED.  Workup notable for YOLANDA, leukocytosis, elevated procal and ascending colitis.  Overnight events/pertinent findings today:  See above  Data  Requiring levophed at 0.04 to maintain MAP above 65  Labs (personally reviewed):  lytes ok--mild hypoNa to 133; yolanda creat 3.12, alt/ast elevated 70/75.  Procal 85.  Lactic ok 1.8.  vbg with mild mixed acidosis 7.29/47.  leukocytosis 29.6.  hgb 10.9 anemia of critical illness--no apparent blood loss.  Ptllts 142--thrombocytopenia of critical illness.  INR 1.47 coagulopathy of critical illness.    Assessment/plan:   Septic shock:  source not fully clear but seems to be colitis.  Continue zosyn; add PO vanco and IV flagyl pending c diff testing.  S/p >30cc/kg in the ED.  Blood cultures sent.  Lactic normal.  Check EF on echo.  Colitis:  seen on CT scan and c/w symptoms of lower abdominal pain.  Abx as above.  C diff testng.  Overall this appears c/w prior presentations for abdominal pain with findings of colitis on CT, selin a prior admission in 2017 at [facility] during which she underwent flex sig which was unrevealing (outside records reviewed).  YOLANDA:  due to sepsis vs hypovolemia.  Fluid resuscitation and pressors to maintain map.    Acidosis: mild.  Trend for now.    Clinically Significant Risk Factors Present on Admission          # Hypocalcemia: Lowest Ca = 8.2 mg/dL in last 2 days, will monitor and replace as appropriate     # Hypoalbuminemia: Lowest albumin =  "3.2 g/dL at 6/1/2024 12:01 PM, will monitor as appropriate  # Coagulation Defect: INR = 1.47 (Ref range: 0.85 - 1.15) and/or PTT = 37 Seconds (Ref range: 22 - 38 Seconds), will monitor for bleeding      # Circulatory Shock: required vasopressors within past 24 hours       # Overweight: Estimated body mass index is 28.22 kg/m  as calculated from the following:    Height as of this encounter: 1.473 m (4' 10\").    Weight as of this encounter: 61.2 kg (135 lb).           # Anemia: based on hgb <11           Rest per resident note.   I spent a total of 45 minutes (excluding procedure time) personally providing and directing critical care services at the bedside and on the critical care unit for this patient.     Param Krishna    "

## 2024-06-01 NOTE — ED PROVIDER NOTES
"    [facility] ([facility])    6/01/24       ED PROVIDER NOTE    History     Chief Complaint   Patient presents with    Abdominal Pain    Back Pain    Hypotension     HPI  Alisha Crowe is a 43 year old female with PMH notable for s/p cholecystectomy, MDD, anxiety, who presents to the Emergency Department from [facility] with abdominal pain and hypotension.     Patient initially presented to urgent care with 1 week of worsening abdominal pain radiating to the back. Also noted lightheadedness, generalized weakness, and dehydration. BP was 64/29. She was transferred to [facility] ED for further evaluation.  Patient was given 1 L of NS by EMS.    Patient states she has been having abdominal pain that shoots around both sides almost to her spine.  Patient has never had pain like this before.  Patient first began to notice pain while mowing, going up a steep hill, but pain resolved.  Couple days after pain began again but again resolved.  Beginning 2 days ago, patient began having pain for the third time but is not gone away.  Patient cannot catch her breath with exertion, and if she tries to walk somewhere notes she is drenched in sweat.  Patient states she gets a fever when pain is present.  Patient has been having trouble having bowel movements, feels like she has to go to the bathroom but cannot, and has pain in her belly and rectum.  Patient denies black or bloody stools.  Patient denies urinary symptoms, but states sometimes it is \"pinkish\".  Patient has never had her heart checked, but states that her 2 boys both have pacemakers.    Past Medical History  No past medical history on file.  No past surgical history on file.  No current outpatient medications on file.    No Known Allergies  Family History  No family history on file.  Social History       A medically appropriate review of systems was performed with pertinent positives and negatives noted in the HPI, and all other " "systems negative.    Physical Exam   BP: (!) 80/48  Pulse: 95  Temp: 98.7  F (37.1  C)  Resp: 16  Height: 147.3 cm (4' 10\")  Weight: 61.2 kg (135 lb)  SpO2: 100 %  Physical Exam  Vitals and nursing note reviewed.   Constitutional:       General: She is in acute distress.      Appearance: Normal appearance. She is ill-appearing, toxic-appearing and diaphoretic.   HENT:      Head: Normocephalic.      Nose: Nose normal.   Eyes:      Pupils: Pupils are equal, round, and reactive to light.   Cardiovascular:      Rate and Rhythm: Normal rate and regular rhythm.   Pulmonary:      Effort: Pulmonary effort is normal.   Abdominal:      General: There is no distension.      Palpations: Abdomen is soft.      Tenderness: There is abdominal tenderness. There is right CVA tenderness and left CVA tenderness.      Comments: Severe tenderness palpation in the suprapubic and bilateral lower quadrants.  No overlying skin changes.   Musculoskeletal:         General: No deformity. Normal range of motion.      Cervical back: Normal range of motion.   Skin:     General: Skin is warm.   Neurological:      Mental Status: She is alert and oriented to person, place, and time.   Psychiatric:         Mood and Affect: Mood normal.           Procedures, & Data        [facility]    -Arterial Line    Date/Time: 6/1/2024 2:30 PM    Performed by: Larry Mendiola DO  Authorized by: Larry Mendiola DO    Risks, benefits and alternatives discussed.      UNIVERSAL PROTOCOL   Site Marked: Yes  Prior Images Obtained and Reviewed:  NA  Required items: Required blood products, implants, devices and special equipment available    Patient identity confirmed:  Anonymous protocol, patient vented/unresponsive  NA - No sedation, light sedation, or local anesthesia  Confirmation Checklist:  Patient's identity using two indicators, relevant allergies, procedure was appropriate and matched the consent or " emergent situation and correct equipment/implants were available  Time out: Immediately prior to the procedure a time out was called    Universal Protocol: the Joint Commission Universal Protocol was followed    Preparation: Patient was prepped and draped in usual sterile fashion      INDICATIONS:   Indications: hemodynamic monitoring and multiple ABGs      PRE-PROCEDURE DETAILS:   Skin preparation:  2% Chlorhexidine  Preparation: Patient was prepped and draped in sterile fashion    PROCEDURE DETAILS:    Location:  R femoral  Senthil's test performed: no    Needle gauge:  20 G  Placement technique:  Seldinger and ultrasound guided  Number of attempts:  1  Transducer: waveform confirmed      POST PROCEDURE DETAILS:    Post-procedure:  Sutured  CMS:  Normal      PROCEDURE    Patient Tolerance:  Patient tolerated the procedure well with no immediate complications  [facility]    -Central Line    Date/Time: 6/1/2024 3:51 PM    Performed by: Larry Mendiola DO  Authorized by: Larry Mendiola DO    Risks, benefits and alternatives discussed.      PRE-PROCEDURE DETAILS:     Hand hygiene: Hand hygiene performed prior to insertion      Sterile barrier technique: All elements of maximal sterile technique followed      Skin preparation:  ChloraPrep    Skin preparation agent: Skin preparation agent completely dried prior to procedure         ANESTHESIA    Anesthesia:  Local infiltration  Local Anesthetic:  Lidocaine 1% without epinephrine    PROCEDURE DETAILS:     Location:  R femoral    Patient position:  Reverse Trendelenburg    Procedural supplies:  Triple lumen    Catheter size:  9 Fr    Landmarks identified: yes      Ultrasound guidance: yes      Sterile ultrasound techniques: Sterile gel and sterile probe covers were used      Number of attempts:  1    Successful placement: no    POST PROCEDURE DETAILS:      Post-procedure:  Dressing applied and line sutured     Assessment:  Blood return through all ports, free fluid flow, placement verified by x-ray and no pneumothorax on x-ray    PROCEDURE  Describe Procedure: Right femoral central line catheter was attempted in the usual fashion.  Wire was threaded without difficulty, however, was unable to advance the triple-lumen catheter and catheter was noted to coil slightly and subcutaneous tissues.  Procedure was aborted.  Mild hematoma was noted.  No evidence of arterial puncture.  Pressure was held for 5 minutes.  No other complications    Results for orders placed during the hospital encounter of 06/01/24    POC US ABDOMEN LIMITED    Impression  Bedside FAST (Focused Assessment with Sonography in Trauma), performed and interpreted by me.  Indication: Abdominal pain and Hypotension    With the patient in Trendelenburg, the RUQ, LUQ and subxiphoid views were examined for intraabdominal and thoracic free fluid and pericardial effusion. With the patient in reverse Trendelenburg, the suprapubic view was examined for intraabdominal free fluid. Image quality was satisfactory..    Findings: There is no evidence of free fluid above or below bilateral diaphragms, in the splenorenal or hepatorenal space, or in bilateral paracolic gutters. There was no free fluid seen in the pelvis adjacent to the urinary bladder. There is no free fluid within the pericardium.      IMPRESSION:  Negative FAST    The patient has signs of Septic Shock  The patient has signs of septic shock as evidenced by:  1. Presence of Sepsis, AND  2. Persistent hypotension defined by the last 2 BP readings within the ONE HOUR following completion of the 30mL/kg bolus being low (SBP <90 or MAP <65)    Time septic shock diagnosis confirmed = 1159  06/01/24   as this was the time when Provider determined that septic shock was present    3 Hour Septic Shock Bundle Completion:  1. Initial Lactic Acid Result:   Recent Labs   Lab Test 06/01/24  1203 06/01/24  1201   LACT 1.8 1.9  "    2. Blood Cultures before Antibiotics: Yes  3. Broad Spectrum Antibiotics Administered:  yes       Anti-infectives (From admission through now)      Start     Dose/Rate Route Frequency Ordered Stop    06/01/24 1417  vancomycin place sanabria - receiving intermittent dosing         1 each Intravenous SEE ADMIN INSTRUCTIONS 06/01/24 1418      06/01/24 1300  vancomycin (VANCOCIN) 1,250 mg in 0.9% NaCl 250 mL intermittent infusion         1,250 mg  over 90 Minutes Intravenous ONCE 06/01/24 1159 06/01/24 1449    06/01/24 1200  piperacillin-tazobactam (ZOSYN) 4.5 g vial to attach to  mL bag        Note to Pharmacy: For [facility], [facility] and [facility]: For Zosyn-naive patients, use the \"Zosyn initial dose + extended infusion\" order panel.    4.5 g  over 30 Minutes Intravenous ONCE 06/01/24 1158 06/01/24 1252            4. IF 30 mL/kg bolus criteria met based on:  -Lactate > 4  OR  -Initial Hypotension:  Definition:  2 low BP readings (SBP <90, MAP <65, or decrease > 40 from baseline due to infection) within 3 hrs of each other during the time period of 6 hrs before and 3 hrs  after time zero  THEN: Fluid volume administered in ED:  Full 30 mL/kg bolus given (see amount below).    BMI Readings from Last 1 Encounters:   06/01/24 28.22 kg/m      30 mL/kg fluids based on weight: 1,840 mL  30 mL/kg fluids based on IBW (must be >= 60 inches tall): Patient ideal weight not available.    Septic Shock reassessment:  1. Repeat Lactic Acid Level: pending  2. Vasopressors started for Persistent Hypotension defined by the last 2 BP readings within the ONE HOUR following completion of the 30mL/kg bolus being low (SBP <90 or MAP <65).    I attest to having performed a repeat sepsis exam and assessment of perfusion at 1400 and the results demonstrate improved perfusion.           Results for orders placed or performed during the hospital encounter of 06/01/24   POC US ABDOMEN LIMITED     Status: None    Impression    Bedside FAST (Focused " Assessment with Sonography in Trauma), performed and interpreted by me.   Indication: Abdominal pain and Hypotension    With the patient in Trendelenburg, the RUQ, LUQ and subxiphoid views were examined for intraabdominal and thoracic free fluid and pericardial effusion. With the patient in reverse Trendelenburg, the suprapubic view was examined for intraabdominal free fluid. Image quality was satisfactory..     Findings: There is no evidence of free fluid above or below bilateral diaphragms, in the splenorenal or hepatorenal space, or in bilateral paracolic gutters. There was no free fluid seen in the pelvis adjacent to the urinary bladder. There is no free fluid within the pericardium.        IMPRESSION:  Negative FAST    CT Abdomen Pelvis w/o Contrast     Status: None    Narrative    EXAMINATION: CT ABDOMEN PELVIS W/O CONTRAST, 6/1/2024 12:57 PM    INDICATION: hypotension, lower abd pain, jordan    COMPARISON STUDY: None    TECHNIQUE: CT scan of the abdomen and pelvis was performed on  multidetector CT scanner using volumetric acquisition technique and  images were reconstructed in multiple planes with variable thickness  and reviewed on dedicated workstations.     CONTRAST: Without contrast.    CT scan radiation dose is optimized to minimum requisite dose using  automated dose modulation techniques.    FINDINGS:    Lower thorax: Trace pleural effusions with overlying subsegmental  bibasilar atelectasis. Left infrahilar pulmonary cyst.    Liver: Enlarged measuring 18.5 cm in craniocaudal dimension. No mass.  No intrahepatic biliary ductal dilation.    Biliary System: Status post cholecystectomy. No extrahepatic biliary  ductal dilation.    Pancreas: No pancreatic ductal dilation.    Adrenal glands: No mass or nodules    Spleen: Normal.    Kidneys: 2 mm left renal parenchymal calculus. No obstructing calculus  or hydronephrosis.    Gastrointestinal tract: Normal caliber bowel.  Edematous colonic bowel  wall  thickening involving the cecum and ascending colon with adjacent  fat stranding and small amount of free fluid. The transverse and  descending colon appear unremarkable.    Mesentery/peritoneum/retroperitoneum: No free fluid or air.    Lymph nodes: No significant lymphadenopathy.    Vasculature: Nonaneurysmal abdominal aorta.    Pelvis: Urinary bladder is normal.    Osseous structures: No aggressive or acute osseous lesion.      Soft tissues: Within normal limits.      Impression    IMPRESSION:   Examination is limited due to lack of IV contrast. Within the  limitations of the examination, findings are concerning for colitis  with bowel wall thickening and adjacent inflammatory changes involving  the cecum and ascending colon. Differential considerations include  infectious, inflammatory or possible ischemic etiology given history  of hypotension.    I have personally reviewed the examination and initial interpretation  and I agree with the findings.    RICARDO BAXTER MD         SYSTEM ID:  [id number]   Comprehensive metabolic panel     Status: Abnormal   Result Value Ref Range    Sodium 133 (L) 135 - 145 mmol/L    Potassium 4.2 3.4 - 5.3 mmol/L    Carbon Dioxide (CO2) 21 (L) 22 - 29 mmol/L    Anion Gap 11 7 - 15 mmol/L    Urea Nitrogen 30.5 (H) 6.0 - 20.0 mg/dL    Creatinine 3.12 (H) 0.51 - 0.95 mg/dL    GFR Estimate 18 (L) >60 mL/min/1.73m2    Calcium 8.2 (L) 8.6 - 10.0 mg/dL    Chloride 101 98 - 107 mmol/L    Glucose 107 (H) 70 - 99 mg/dL    Alkaline Phosphatase 64 40 - 150 U/L    AST 75 (H) 0 - 45 U/L    ALT 70 (H) 0 - 50 U/L    Protein Total 5.4 (L) 6.4 - 8.3 g/dL    Albumin 3.2 (L) 3.5 - 5.2 g/dL    Bilirubin Total 0.3 <=1.2 mg/dL   Blood gas venous     Status: Abnormal   Result Value Ref Range    pH Venous 7.33 7.32 - 7.43    pCO2 Venous 46 40 - 50 mm Hg    pO2 Venous 25 25 - 47 mm Hg    Bicarbonate Venous 24 21 - 28 mmol/L    Base Excess/Deficit Venous -2.1 -3.0 - 3.0 mmol/L    FIO2 21     Oxyhemoglobin  Venous 42 (L) 70 - 75 %    O2 Sat, Venous 42.9 (L) 70.0 - 75.0 %    Narrative    In healthy individuals, oxyhemoglobin (O2Hb) and oxygen saturation (SO2) are approximately equal. In the presence of dyshemoglobins, oxyhemoglobin can be considerably lower than oxygen saturation.   Lactic Acid Whole Blood with 1X Repeat in 2 HR when >2     Status: Normal   Result Value Ref Range    Lactic Acid, Initial 1.9 0.7 - 2.0 mmol/L   Procalcitonin     Status: Abnormal   Result Value Ref Range    Procalcitonin 84.70 (HH) <0.50 ng/mL   Lipase     Status: Abnormal   Result Value Ref Range    Lipase 11 (L) 13 - 60 U/L   INR     Status: Abnormal   Result Value Ref Range    INR 1.47 (H) 0.85 - 1.15   Partial thromboplastin time     Status: Normal   Result Value Ref Range    aPTT 37 22 - 38 Seconds   Nt probnp inpatient (BNP)     Status: Abnormal   Result Value Ref Range    N terminal Pro BNP Inpatient 1,356 (H) 0 - 450 pg/mL   CBC with platelets and differential     Status: Abnormal   Result Value Ref Range    WBC Count 29.6 (H) 4.0 - 11.0 10e3/uL    RBC Count 3.63 (L) 3.80 - 5.20 10e6/uL    Hemoglobin 11.1 (L) 11.7 - 15.7 g/dL    Hematocrit 32.7 (L) 35.0 - 47.0 %    MCV 90 78 - 100 fL    MCH 30.6 26.5 - 33.0 pg    MCHC 33.9 31.5 - 36.5 g/dL    RDW 13.4 10.0 - 15.0 %    Platelet Count 142 (L) 150 - 450 10e3/uL    % Neutrophils      % Lymphocytes      % Monocytes      % Eosinophils      % Basophils      % Immature Granulocytes      NRBCs per 100 WBC 0 <1 /100    Absolute Neutrophils      Absolute Lymphocytes      Absolute Monocytes      Absolute Eosinophils      Absolute Basophils      Absolute Immature Granulocytes      Absolute NRBCs 0.0 10e3/uL   iStat Gases (lactate) venous, POCT     Status: Abnormal   Result Value Ref Range    Lactic Acid POCT 1.8 <=2.0 mmol/L    Bicarbonate Venous POCT 22 21 - 28 mmol/L    O2 Sat, Venous POCT 42 (L) 70 - 75 %    pCO2 Venous POCT 43 40 - 50 mm Hg    pH Venous POCT 7.31 (L) 7.32 - 7.43    pO2  Venous POCT 26 25 - 47 mm Hg    Base Excess/Deficit (+/-) POCT -4.0 (L) -3.0 - 3.0 mmol/L   iStat Troponin, POCT     Status: Normal   Result Value Ref Range    TROPPC POCT 0.01 <=0.12 ug/L   iStat Gases Electrolytes ICA Glucose Venous, POCT     Status: Abnormal   Result Value Ref Range    CPB Applied No     Hematocrit POCT 32 (L) 35 - 47 %    Calcium, Ionized Whole Blood POCT 4.7 4.4 - 5.2 mg/dL    Glucose Whole Blood POCT 95 70 - 99 mg/dL    Bicarbonate Venous POCT 23 21 - 28 mmol/L    Hemoglobin POCT 10.9 (L) 11.7 - 15.7 g/dL    Potassium POCT 3.9 3.4 - 5.3 mmol/L    Sodium POCT 134 (L) 135 - 145 mmol/L    pCO2 Venous POCT 47 40 - 50 mm Hg    pO2 Venous POCT 25 25 - 47 mm Hg    pH Venous POCT 7.29 (L) 7.32 - 7.43    O2 Sat, Venous POCT 39 (L) 70 - 75 %    Base Excess/Deficit (+/-) POCT -4.0 (L) -3.0 - 3.0 mmol/L   Creatinine POCT     Status: Abnormal   Result Value Ref Range    Creatinine POCT 3.2 (H) 0.5 - 1.0 mg/dL    GFR, ESTIMATED POCT 18 (L) >60 mL/min/1.73m2   Manual Differential     Status: Abnormal   Result Value Ref Range    % Neutrophils 96 %    % Lymphocytes 0 %    % Monocytes 4 %    % Eosinophils 0 %    % Basophils 0 %    Absolute Neutrophils 28.4 (H) 1.6 - 8.3 10e3/uL    Absolute Lymphocytes 0.0 (L) 0.8 - 5.3 10e3/uL    Absolute Monocytes 1.2 0.0 - 1.3 10e3/uL    Absolute Eosinophils 0.0 0.0 - 0.7 10e3/uL    Absolute Basophils 0.0 0.0 - 0.2 10e3/uL    RBC Morphology Confirmed RBC Indices     Platelet Assessment  Automated Count Confirmed. Platelet morphology is normal.     Automated Count Confirmed. Platelet morphology is normal.   EKG 12-lead, tracing only     Status: None (Preliminary result)   Result Value Ref Range    Systolic Blood Pressure  mmHg    Diastolic Blood Pressure  mmHg    Ventricular Rate 90 BPM    Atrial Rate 90 BPM    ME Interval 146 ms    QRS Duration 82 ms     ms    QTc 445 ms    P Axis 72 degrees    R AXIS 0 degrees    T Axis 27 degrees    Interpretation ECG Sinus  rhythm  Normal ECG      CBC with platelets differential     Status: Abnormal    Narrative    The following orders were created for panel order CBC with platelets differential.  Procedure                               Abnormality         Status                     ---------                               -----------         ------                     CBC with platelets and d...[[id number]]  Abnormal            Final result               Manual Differential[[id number]]          Abnormal            Final result                 Please view results for these tests on the individual orders.     Medications   sodium chloride (PF) 0.9% PF flush 3 mL (has no administration in time range)   sodium chloride (PF) 0.9% PF flush 3 mL (3 mLs Intracatheter $Given 6/1/24 1216)   pharmacy alert - intermittent dosing (has no administration in time range)   norepinephrine (LEVOPHED) 4 mg/250 mL NS infusion ADULT (PERIPHERAL) (0.03 mcg/kg/min × 61.2 kg (Dosing Weight) Intravenous Rate/Dose Change 6/1/24 1415)   vancomycin place san[place]ria - receiving intermittent dosing (has no administration in time range)   lactated ringers BOLUS 1,836 mL (0 mLs Intravenous Stopped 6/1/24 1331)   piperacillin-tazobactam (ZOSYN) 4.5 g vial to attach to  mL bag (0 g Intravenous Stopped 6/1/24 1252)   vancomycin (VANCOCIN) 1,250 mg in 0.9% NaCl 250 mL intermittent infusion (1,250 mg Intravenous $New Bag 6/1/24 1226)   sodium chloride 0.9% BOLUS 1,000 mL (0 mLs Intravenous Stopped 6/1/24 1252)   acetaminophen (TYLENOL) tablet 1,000 mg (1,000 mg Oral Not Given 6/1/24 1332)     Labs Ordered and Resulted from Time of ED Arrival to Time of ED Departure   COMPREHENSIVE METABOLIC PANEL - Abnormal       Result Value    Sodium 133 (*)     Potassium 4.2      Carbon Dioxide (CO2) 21 (*)     Anion Gap 11      Urea Nitrogen 30.5 (*)     Creatinine 3.12 (*)     GFR Estimate 18 (*)     Calcium 8.2 (*)     Chloride 101      Glucose 107 (*)     Alkaline Phosphatase 64       AST 75 (*)     ALT 70 (*)     Protein Total 5.4 (*)     Albumin 3.2 (*)     Bilirubin Total 0.3     BLOOD GAS VENOUS - Abnormal    pH Venous 7.33      pCO2 Venous 46      pO2 Venous 25      Bicarbonate Venous 24      Base Excess/Deficit Venous -2.1      FIO2 21      Oxyhemoglobin Venous 42 (*)     O2 Sat, Venous 42.9 (*)    PROCALCITONIN - Abnormal    Procalcitonin 84.70 (*)    LIPASE - Abnormal    Lipase 11 (*)    INR - Abnormal    INR 1.47 (*)    NT PROBNP INPATIENT - Abnormal    N terminal Pro BNP Inpatient 1,356 (*)    CBC WITH PLATELETS AND DIFFERENTIAL - Abnormal    WBC Count 29.6 (*)     RBC Count 3.63 (*)     Hemoglobin 11.1 (*)     Hematocrit 32.7 (*)     MCV 90      MCH 30.6      MCHC 33.9      RDW 13.4      Platelet Count 142 (*)     % Neutrophils        % Lymphocytes        % Monocytes        % Eosinophils        % Basophils        % Immature Granulocytes        NRBCs per 100 WBC 0      Absolute Neutrophils        Absolute Lymphocytes        Absolute Monocytes        Absolute Eosinophils        Absolute Basophils        Absolute Immature Granulocytes        Absolute NRBCs 0.0     ISTAT GASES LACTATE VENOUS POCT - Abnormal    Lactic Acid POCT 1.8      Bicarbonate Venous POCT 22      O2 Sat, Venous POCT 42 (*)     pCO2 Venous POCT 43      pH Venous POCT 7.31 (*)     pO2 Venous POCT 26      Base Excess/Deficit (+/-) POCT -4.0 (*)    ISTAT GASES ELECTROLYTES ICA GLUCOSE VENOUS POCT - Abnormal    CPB Applied No      Hematocrit POCT 32 (*)     Calcium, Ionized Whole Blood POCT 4.7      Glucose Whole Blood POCT 95      Bicarbonate Venous POCT 23      Hemoglobin POCT 10.9 (*)     Potassium POCT 3.9      Sodium POCT 134 (*)     pCO2 Venous POCT 47      pO2 Venous POCT 25      pH Venous POCT 7.29 (*)     O2 Sat, Venous POCT 39 (*)     Base Excess/Deficit (+/-) POCT -4.0 (*)    ISTAT CREATININE POCT - Abnormal    Creatinine POCT 3.2 (*)     GFR, ESTIMATED POCT 18 (*)    DIFFERENTIAL - Abnormal    % Neutrophils  96      % Lymphocytes 0      % Monocytes 4      % Eosinophils 0      % Basophils 0      Absolute Neutrophils 28.4 (*)     Absolute Lymphocytes 0.0 (*)     Absolute Monocytes 1.2      Absolute Eosinophils 0.0      Absolute Basophils 0.0      RBC Morphology Confirmed RBC Indices      Platelet Assessment        Value: Automated Count Confirmed. Platelet morphology is normal.   LACTIC ACID WHOLE BLOOD WITH 1X REPEAT IN 2 HR WHEN >2 - Normal    Lactic Acid, Initial 1.9     PARTIAL THROMBOPLASTIN TIME - Normal    aPTT 37     ISTAT TROPONIN POCT - Normal    TROPPC POCT 0.01     ROUTINE UA WITH MICROSCOPIC   TROPONIN T, HIGH SENSITIVITY   HCG QUALITATIVE PREGNANCY   URINE CULTURE   BLOOD CULTURE   BLOOD CULTURE     CT Abdomen Pelvis w/o Contrast   Final Result   IMPRESSION:    Examination is limited due to lack of IV contrast. Within the   limitations of the examination, findings are concerning for colitis   with bowel wall thickening and adjacent inflammatory changes involving   the cecum and ascending colon. Differential considerations include   infectious, inflammatory or possible ischemic etiology given history   of hypotension.      I have personally reviewed the examination and initial interpretation   and I agree with the findings.      RICARDO BAXTER MD            SYSTEM ID:  [id number]      POC US ABDOMEN LIMITED   Final Result   Bedside FAST (Focused Assessment with Sonography in Trauma), performed and interpreted by me.    Indication: Abdominal pain and Hypotension      With the patient in Trendelenburg, the RUQ, LUQ and subxiphoid views were examined for intraabdominal and thoracic free fluid and pericardial effusion. With the patient in reverse Trendelenburg, the suprapubic view was examined for intraabdominal free fluid. Image quality was satisfactory..       Findings: There is no evidence of free fluid above or below bilateral diaphragms, in the splenorenal or hepatorenal space, or in bilateral paracolic  gutters. There was no free fluid seen in the pelvis adjacent to the urinary bladder. There is no free fluid within the pericardium.           IMPRESSION:  Negative FAST              Critical Care Addendum  My initial assessment, based on my review of nursing observations, review of vital signs, focused history, physical exam, review of cardiac rhythm monitor, and 12 lead ECG analysis, established a high suspicion that Alisha Crowe has sepsis with indication for early goal-directed therapy and severe hypotension, which requires immediate intervention, and therefore she is critically ill.     After the initial assessment, the care team initiated multiple lab tests, initiated IV fluid administration, and initiated medication therapy with vanc, zosyn, norepi  to provide stabilization care. Due to the critical nature of this patient, I reassessed nursing observations, vital signs, physical exam, mental status, neurologic status, and respiratory status multiple times prior to her disposition.     Time also spent performing documentation, reviewing test results, discussion with consultants, and coordination of care.     Critical care time (excluding teaching time and procedures): 60 minutes.       Assessment & Plan      ED Course as of 06/01/24 1550   Sat Jun 01, 2024   1218      EKG Interpretation:     Interpreted by Larry Mendiola DO  Time reviewed:1210   Symptoms at time of EKG: hypotension, abd pain   Rhythm: normal sinus   Rate: normal  Axis: NORMAL  Ectopy: none  Conduction: normal  ST Segments/ T Waves: No ST-T wave changes  Q Waves: none  Comparison to prior: No old EKG available    Clinical Impression: normal EKG    1232 Patient with history of tubal ligation and endometrial ablation.  Do not need to wait for hCG prior to imaging   1245 Patient presents to the ED for evaluation of abdominal pain.  Was seen in urgent care and was noted be hypotensive so came to the ED for further evaluation.  Complains of  bilateral lower quadrant abdominal pain for the past 2 weeks.   1246 On arrival, patient is hypotensive at 80/48, she is afebrile, no respiratory distress.  She has significant tenderness in the bilateral lower quadrants and suprapubic area   1246 Patient appears critically ill and will need septic workup.  Point-of-care ultrasound with no free fluid in the abdomen, no pericardial effusion, and good cardiac motion.  Suspect patient be fluid responsive.  Will give 30 cc/kg of IV lactated Ringer's.  Patient currently has 2 large-bore peripheral IVs.   1247 EKG without cardiac dysrhythmia or signs of acute ischemia.   1247 Creatinine POCT(!): 3.2   1247 Lactic Acid POCT: 1.8   1247 TROPPC POCT: 0.01   1247 WBC(!): 29.6   1247 Hemoglobin(!): 11.1   1248 Vancomycin/Zosyn ordered at 1159   1248 Zosyn given at 1216   1436 Single failed attempt at right femoral central line placement.  Pressure held for 5 minutes with mild hematoma formation.  Right femoral arterial line placed without complication   1437 WBC(!): 29.6   1437 Lipase(!): 11   1437 Creatinine(!): 3.12   1437 Potassium: 4.2   1437 Lactic Acid: 1.9   1437 CT Abdomen Pelvis w/o Contrast  CT scan shows nonspecific colitis.  Could be ischemic in nature.  General surgery consulted, official recommendations are pending.   1438 Plan for admission to the ICU.   1438 Overall clinical picture consistent with septic shock, requiring vasoactive medications.  Exact source is unclear at this point.  Plan for discussion with MICU attending.       I have reviewed the nursing notes. I have reviewed the findings, diagnosis, plan and need for follow up with the patient.    New Prescriptions    No medications on file       Final diagnoses:   Septic shock (H)   I, Herrera Martinez, am serving as a trained medical scribe to document services personally performed by Larry Mendiola DO, based to the provider's statements to me.     I, Larry Mendiola DO, was physically present and have  reviewed and verified the accuracy of this note documented by Herrera Martinez.       Larry Mendiola DO  [facility] EMERGENCY DEPARTMENT  6/1/2024        Larry Mendiola DO  06/01/24 1554

## 2024-06-01 NOTE — LETTER
Former[facility] 7C MED SURG  [address] [address]  Phone: [phone]    June 7, 2024        Alisha Crowe  [address]  [place] [address]          To whom it may concern:    RE: Alisha Crowe    Patient was hospitalized at the [facility] ER from 6/1/24 through 6/7/24 for an infection.  Please excuse her from work from these dates until 6/10/24.     Please contact me for questions or concerns.      Sincerely,    BILLIE Anthony, CNP   Internal Medicine NILDA Hospitalist  [facility]  [phone]

## 2024-06-02 ENCOUNTER — APPOINTMENT (OUTPATIENT)
Dept: CARDIOLOGY | Facility: CLINIC | Age: 44
DRG: 871 | End: 2024-06-02

## 2024-06-02 ENCOUNTER — APPOINTMENT (OUTPATIENT)
Dept: ULTRASOUND IMAGING | Facility: CLINIC | Age: 44
DRG: 871 | End: 2024-06-02
Attending: STUDENT IN AN ORGANIZED HEALTH CARE EDUCATION/TRAINING PROGRAM

## 2024-06-02 LAB
ACINETOBACTER SPECIES: NOT DETECTED
ANION GAP SERPL CALCULATED.3IONS-SCNC: 10 MMOL/L (ref 7–15)
ANION GAP SERPL CALCULATED.3IONS-SCNC: 11 MMOL/L (ref 7–15)
BASOPHILS # BLD AUTO: ABNORMAL 10*3/UL
BASOPHILS # BLD MANUAL: 0 10E3/UL (ref 0–0.2)
BASOPHILS NFR BLD AUTO: ABNORMAL %
BASOPHILS NFR BLD MANUAL: 0 %
BUN SERPL-MCNC: 25.8 MG/DL (ref 6–20)
BUN SERPL-MCNC: 29.8 MG/DL (ref 6–20)
BURR CELLS BLD QL SMEAR: SLIGHT
CALCIUM SERPL-MCNC: 7.8 MG/DL (ref 8.6–10)
CALCIUM SERPL-MCNC: 8 MG/DL (ref 8.6–10)
CHLORIDE SERPL-SCNC: 106 MMOL/L (ref 98–107)
CHLORIDE SERPL-SCNC: 110 MMOL/L (ref 98–107)
CITROBACTER SPECIES: NOT DETECTED
CREAT SERPL-MCNC: 1.87 MG/DL (ref 0.51–0.95)
CREAT SERPL-MCNC: 2.23 MG/DL (ref 0.51–0.95)
CTX-M: NOT DETECTED
DEPRECATED HCO3 PLAS-SCNC: 18 MMOL/L (ref 22–29)
DEPRECATED HCO3 PLAS-SCNC: 18 MMOL/L (ref 22–29)
EGFRCR SERPLBLD CKD-EPI 2021: 27 ML/MIN/1.73M2
EGFRCR SERPLBLD CKD-EPI 2021: 34 ML/MIN/1.73M2
ENTEROBACTER SPECIES: NOT DETECTED
EOSINOPHIL # BLD AUTO: ABNORMAL 10*3/UL
EOSINOPHIL # BLD MANUAL: 0 10E3/UL (ref 0–0.7)
EOSINOPHIL NFR BLD AUTO: ABNORMAL %
EOSINOPHIL NFR BLD MANUAL: 0 %
ERYTHROCYTE [DISTWIDTH] IN BLOOD BY AUTOMATED COUNT: 13.7 % (ref 10–15)
ESCHERICHIA COLI: DETECTED
GLUCOSE BLDC GLUCOMTR-MCNC: 84 MG/DL (ref 70–99)
GLUCOSE SERPL-MCNC: 109 MG/DL (ref 70–99)
GLUCOSE SERPL-MCNC: 99 MG/DL (ref 70–99)
HCT VFR BLD AUTO: 30.6 % (ref 35–47)
HGB BLD-MCNC: 10.6 G/DL (ref 11.7–15.7)
IMM GRANULOCYTES # BLD: ABNORMAL 10*3/UL
IMM GRANULOCYTES NFR BLD: ABNORMAL %
IMP: NOT DETECTED
KLEBSIELLA OXYTOCA: NOT DETECTED
KLEBSIELLA PNEUMONIAE: NOT DETECTED
KPC: NOT DETECTED
LACTATE SERPL-SCNC: 1.4 MMOL/L (ref 0.7–2)
LVEF ECHO: NORMAL
LYMPHOCYTES # BLD AUTO: ABNORMAL 10*3/UL
LYMPHOCYTES # BLD MANUAL: 0.9 10E3/UL (ref 0.8–5.3)
LYMPHOCYTES NFR BLD AUTO: ABNORMAL %
LYMPHOCYTES NFR BLD MANUAL: 4 %
MCH RBC QN AUTO: 30.4 PG (ref 26.5–33)
MCHC RBC AUTO-ENTMCNC: 34.6 G/DL (ref 31.5–36.5)
MCV RBC AUTO: 88 FL (ref 78–100)
MONOCYTES # BLD AUTO: ABNORMAL 10*3/UL
MONOCYTES # BLD MANUAL: 0 10E3/UL (ref 0–1.3)
MONOCYTES NFR BLD AUTO: ABNORMAL %
MONOCYTES NFR BLD MANUAL: 0 %
NDM: NOT DETECTED
NEUTROPHILS # BLD AUTO: ABNORMAL 10*3/UL
NEUTROPHILS # BLD MANUAL: 21.3 10E3/UL (ref 1.6–8.3)
NEUTROPHILS NFR BLD AUTO: ABNORMAL %
NEUTROPHILS NFR BLD MANUAL: 96 %
NRBC # BLD AUTO: 0 10E3/UL
NRBC BLD AUTO-RTO: 0 /100
OXA (DETECTED/NOT DETECTED): NOT DETECTED
PLAT MORPH BLD: ABNORMAL
PLATELET # BLD AUTO: 116 10E3/UL (ref 150–450)
POTASSIUM SERPL-SCNC: 3.6 MMOL/L (ref 3.4–5.3)
POTASSIUM SERPL-SCNC: 3.7 MMOL/L (ref 3.4–5.3)
PROTEUS SPECIES: NOT DETECTED
PSEUDOMONAS AERUGINOSA: NOT DETECTED
RBC # BLD AUTO: 3.49 10E6/UL (ref 3.8–5.2)
RBC MORPH BLD: ABNORMAL
SODIUM SERPL-SCNC: 134 MMOL/L (ref 135–145)
SODIUM SERPL-SCNC: 139 MMOL/L (ref 135–145)
VIM: NOT DETECTED
WBC # BLD AUTO: 22.2 10E3/UL (ref 4–11)

## 2024-06-02 PROCEDURE — 93306 TTE W/DOPPLER COMPLETE: CPT | Mod: 26 | Performed by: INTERNAL MEDICINE

## 2024-06-02 PROCEDURE — 250N000013 HC RX MED GY IP 250 OP 250 PS 637

## 2024-06-02 PROCEDURE — 250N000011 HC RX IP 250 OP 636: Performed by: INTERNAL MEDICINE

## 2024-06-02 PROCEDURE — 36415 COLL VENOUS BLD VENIPUNCTURE: CPT

## 2024-06-02 PROCEDURE — 83605 ASSAY OF LACTIC ACID: CPT

## 2024-06-02 PROCEDURE — 93975 VASCULAR STUDY: CPT

## 2024-06-02 PROCEDURE — 87040 BLOOD CULTURE FOR BACTERIA: CPT

## 2024-06-02 PROCEDURE — 250N000011 HC RX IP 250 OP 636

## 2024-06-02 PROCEDURE — 93975 VASCULAR STUDY: CPT | Mod: 26 | Performed by: RADIOLOGY

## 2024-06-02 PROCEDURE — 99255 IP/OBS CONSLTJ NEW/EST HI 80: CPT | Performed by: INTERNAL MEDICINE

## 2024-06-02 PROCEDURE — 93306 TTE W/DOPPLER COMPLETE: CPT

## 2024-06-02 PROCEDURE — 99291 CRITICAL CARE FIRST HOUR: CPT | Mod: FS

## 2024-06-02 PROCEDURE — 80048 BASIC METABOLIC PNL TOTAL CA: CPT

## 2024-06-02 PROCEDURE — 85014 HEMATOCRIT: CPT

## 2024-06-02 PROCEDURE — 99292 CRITICAL CARE ADDL 30 MIN: CPT | Mod: FS

## 2024-06-02 PROCEDURE — 200N000002 HC R&B ICU UMMC

## 2024-06-02 PROCEDURE — 85007 BL SMEAR W/DIFF WBC COUNT: CPT

## 2024-06-02 RX ORDER — OXYCODONE HYDROCHLORIDE 5 MG/1
5 TABLET ORAL EVERY 4 HOURS PRN
Status: DISCONTINUED | OUTPATIENT
Start: 2024-06-02 | End: 2024-06-03

## 2024-06-02 RX ORDER — PIPERACILLIN SODIUM, TAZOBACTAM SODIUM 3; .375 G/15ML; G/15ML
3.38 INJECTION, POWDER, LYOPHILIZED, FOR SOLUTION INTRAVENOUS EVERY 6 HOURS
Status: DISCONTINUED | OUTPATIENT
Start: 2024-06-02 | End: 2024-06-03

## 2024-06-02 RX ADMIN — PIPERACILLIN SODIUM AND TAZOBACTAM SODIUM 3.38 G: 3; .375 INJECTION, POWDER, LYOPHILIZED, FOR SOLUTION INTRAVENOUS at 17:10

## 2024-06-02 RX ADMIN — HYDROMORPHONE HYDROCHLORIDE 0.2 MG: 0.2 INJECTION, SOLUTION INTRAMUSCULAR; INTRAVENOUS; SUBCUTANEOUS at 12:47

## 2024-06-02 RX ADMIN — VANCOMYCIN HYDROCHLORIDE 125 MG: 125 CAPSULE ORAL at 07:47

## 2024-06-02 RX ADMIN — HYDROMORPHONE HYDROCHLORIDE 0.2 MG: 0.2 INJECTION, SOLUTION INTRAMUSCULAR; INTRAVENOUS; SUBCUTANEOUS at 21:45

## 2024-06-02 RX ADMIN — HEPARIN SODIUM 5000 UNITS: 5000 INJECTION, SOLUTION INTRAVENOUS; SUBCUTANEOUS at 17:10

## 2024-06-02 RX ADMIN — METRONIDAZOLE 500 MG: 500 INJECTION, SOLUTION INTRAVENOUS at 05:02

## 2024-06-02 RX ADMIN — PIPERACILLIN SODIUM AND TAZOBACTAM SODIUM 3.38 G: 3; .375 INJECTION, POWDER, LYOPHILIZED, FOR SOLUTION INTRAVENOUS at 11:45

## 2024-06-02 RX ADMIN — HEPARIN SODIUM 5000 UNITS: 5000 INJECTION, SOLUTION INTRAVENOUS; SUBCUTANEOUS at 07:55

## 2024-06-02 RX ADMIN — HYDROMORPHONE HYDROCHLORIDE 0.2 MG: 0.2 INJECTION, SOLUTION INTRAMUSCULAR; INTRAVENOUS; SUBCUTANEOUS at 17:10

## 2024-06-02 RX ADMIN — ACETAMINOPHEN 975 MG: 325 TABLET, FILM COATED ORAL at 00:59

## 2024-06-02 RX ADMIN — HYDROMORPHONE HYDROCHLORIDE 0.2 MG: 0.2 INJECTION, SOLUTION INTRAMUSCULAR; INTRAVENOUS; SUBCUTANEOUS at 07:47

## 2024-06-02 RX ADMIN — PIPERACILLIN SODIUM AND TAZOBACTAM SODIUM 2.25 G: 2; .25 INJECTION, POWDER, LYOPHILIZED, FOR SOLUTION INTRAVENOUS at 06:53

## 2024-06-02 ASSESSMENT — ACTIVITIES OF DAILY LIVING (ADL)
ADLS_ACUITY_SCORE: 37
ADLS_ACUITY_SCORE: 32
ADLS_ACUITY_SCORE: 30
ADLS_ACUITY_SCORE: 37
ADLS_ACUITY_SCORE: 47
ADLS_ACUITY_SCORE: 47
ADLS_ACUITY_SCORE: 32
ADLS_ACUITY_SCORE: 47
ADLS_ACUITY_SCORE: 32
ADLS_ACUITY_SCORE: 47
ADLS_ACUITY_SCORE: 47
ADLS_ACUITY_SCORE: 30
ADLS_ACUITY_SCORE: 32
ADLS_ACUITY_SCORE: 32
ADLS_ACUITY_SCORE: 30
ADLS_ACUITY_SCORE: 47
ADLS_ACUITY_SCORE: 32
ADLS_ACUITY_SCORE: 32
ADLS_ACUITY_SCORE: 47
ADLS_ACUITY_SCORE: 47
ADLS_ACUITY_SCORE: 32

## 2024-06-02 NOTE — PROGRESS NOTES
[facility], [place]   Antimicrobial Stewardship Team (AST) Gram-Negative Bacteremia Note              To:MICU  Unit: 4C  ALLERGIES:  Codeine, Meperidine, Ceftriaxone, Ciprofloxacin, Hydrocodone-acetaminophen, Nalbuphine, and Tylenol [acetaminophen]      Positive blood culture resulted from Verigene  Organism identified: Escherichia coli   Resistance gene detected: None    Brief Summary: Ailsha Crowe is a 43 year old female with history of  asthma, MDD, ADHD, PTSD, chronic pelvic pain, chronic constipation, history of colitis (2017), and cholecystectomy who transferred from urgent care on 6/1/2024 with worsening abdominal pain and hypotension (BP 64/29). Reported having intermittent abdominal pain that radiates to back, acutely worsened PTA abdominal with fever and diaphoresis. Endorsed constipation. On presentation to the ED, patient was afebrile later spiked a fevet (103.3) with persistent hypotension despite fluid resuscitation, started on norepinephrine. All other VSS. Labs were notable for leukocytosis (WBC 29.6, ANC 28.4), YOLANDA (Scr 3.12, baseline ~1), and procalcitonin 84.7. Lactic acid WNL. UA with 63 WBC and large leukocyte esterase.  Patient was started on empiric Zosyn and IV vancomycin for septic shock of unclear source. CT abdomen/pelvis concerning for colitis with bowel wall thickening and adjacent inflammatory changes involving the cecum and ascending colon; differential considerations include infectious, inflammatory or possible ischemic etiology given history of hypotension. Pelvic US negative for ovarian torsion. Oral vancomycin and IV metronidazole added due to concern for C diff associated colitis pending C diff test.  Blood cultures are positive (4 out of 4 bottles) with Gram-negative bacilli identified by Verigene as E .coli without any resistance gene. Urine culture pending and nares MRSA PCR test negative. IV vancomycin discontinued. Repeat blood culture from 6/2  pending.  Assessment: Septic shock 2/2 E coli bacteremia and colitis   Source of E. coli bacteremia is most likely related to intraabdominal source given symptoms of abdominal pain and CT findings of colitis of unclear source. Of note, patient does have history of colitis in 2017 deemed to be related to chronic constipation due to unremarkable colonoscopy results. Other sources of Gram-negative bacteremia could include UTI however, patient did not present with urinary symptoms and no acute changes of urinary tract on CT. Some pyuria on UA likely related to dehydration, urine culture is pending. No indwelling central lines or other symptoms concerning for infection. Patient has defervesced with slight improvement of leukocytosis. Does remain on pressor support, though low dose. Recommend de-escalating Zosyn to ceftriaxone for targeted E coli therapy and no isolation of Pseudomonas on culture. With likely source of intraabdominal source of bacteremia, agree with continuing metronidazole. If patient does not have barrier for oral intake, consider switching  IV metronidazole to oral (1:1 IV to PO conversion due to excellent oral bioavailability). Duration of therapy will be dependent on blood culture clearance, source of bacteremia, and clinical improvement.   Agree with continuing empiric oral vancomycin at this time pending C diff test given clinical instability and ongoing workup.  Recommendations:  Deescalate Zosyn to ceftriaxone 2 g daily and continue metronidazole 500 mg every 12 hours    Reasonable to continue oral vancomycin pending C diff test in the setting of colitis    Payal Gallardo, PharmD, [facility]  Pager: [phone]      Current antibiotics       Active Anti-infective Medications   (From admission, onward)                 Start     Stop    06/01/24 1800  piperacillin-tazobactam  2.25 g,   Intravenous,   EVERY 6 HOURS        Sepsis       --    06/01/24 1630  vancomycin  125 mg,   Oral,   4 TIMES DAILY         Clostridioides difficile   cdiff       --    06/01/24 0920  metroNIDAZOLE  500 mg,   Intravenous,   EVERY 12 HOURS        Clostridioides difficile       --

## 2024-06-02 NOTE — PROGRESS NOTES
"    [facility]    Progress Note - Colorectal Surgery Service      Assessment & Plan: Surgery   Alisha Crowe is a 43 year old female with hx notable for previous episode of colitis, family hx of mother requiring total abdominal colectomy who presented to the ED critically ill with abdominal pain and septic shock. CT was reviewed showing fat stranding around the ascending colon though not significant enough to explain the extent of her critical illness. No evidence of perforation or other apparent surgical disease. Her blood cultures have returned positive for GNR, suggesting bacterial translocation process. She has been slowly improving with resuscitation and antibiotics    - Would recommend keeping her NPO while on pressors and until abdominal pain improves  - Recommend GI consult given her history of unspecified colitis and findings of ascending colitis on scan  - Continue remainder of care per ICU       Drains: None     Code Status: Full Code    Clinically Significant Risk Factors Present on Admission              # Hypoalbuminemia: Lowest albumin = 3 g/dL at 6/1/2024  9:52 PM, will monitor as appropriate  # Coagulation Defect: INR = 1.47 (Ref range: 0.85 - 1.15) and/or PTT = 37 Seconds (Ref range: 22 - 38 Seconds), will monitor for bleeding  # Thrombocytopenia: Lowest platelets = 116 in last 2 days, will monitor for bleeding     # Circulatory Shock: required vasopressors within past 24 hours       # Obesity: Estimated body mass index is 33.64 kg/m  as calculated from the following:    Height as of this encounter: 1.473 m (4' 10\").    Weight as of this encounter: 73 kg (160 lb 15 oz).            The patient's care was discussed with Dr. Jairo Mehta MD  [facility]  Non-urgent messages: Securely message with [email or web address] (more info)  Text page via [facility] Paging/Directory "     ______________________________________________________________________    Interval History  Still having pain, but improved with pain medication. Weaning down on levophed. Blood cultures positive for GNR. Febrile to 103.3 last night.       Physical Exam   Vital Signs: Temp: 98.3  F (36.8  C) Temp src: Oral BP: 92/65 Pulse: 81   Resp: 17 SpO2: 96 % O2 Device: None (Room air) Oxygen Delivery: 2 LPM  Weight: 160 lbs 14.97 ozNo intake or output data in the 24 hours ending 06/01/24 1346  General Appearance: Alert, appears uncomfortable, no acute distress  Respiratory: breathing comfortably on room air  GI: well healed laparoscopic surgical scars, soft, diffusely tender right greater than left, soft, nondistended. No peritoneal signs.   Skin: pale        Data   Labs and imaging reviewed    WBC 22.2  Hgb 10.6  Creat 2.23    CT Abdomen Pelvis w/o Contrast    Result Date: 6/1/2024    IMPRESSION: Examination is limited due to lack of IV contrast. Within the limitations of the examination, findings are concerning for colitis with bowel wall thickening and adjacent inflammatory changes involving the cecum and ascending colon. Differential considerations include infectious, inflammatory or possible ischemic etiology given history of hypotension. I have personally reviewed the examination and initial interpretation and I agree with the findings. RICARDO BAXTER MD   SYSTEM ID:  [id number]

## 2024-06-02 NOTE — PROGRESS NOTES
Critical Care Services Progress Note:  I have evaluated all laboratory values and imaging studies from the past 24 hours.  Summary of hospital course:  43F pmh of MDD, anxiety now presented from urgent care for abdominal pain and hypotension  Persistently hypotensive despite 4L crystalloid in the ED.  Workup notable for YOLANDA, leukocytosis, elevated procal and ascending colitis.  Overnight events/pertinent findings today:  See above  Data  Still on some levophed to maintain MAP above 65  Labs (personally reviewed):  lytes ok--mild hypoNa to 134; yolanda creat 2.23 downtrending, alt/ast downtrending 66/74.  leukocytosis 22.2 downtrending.  hgb 10.6 anemia of critical illness--no apparent blood loss.  Ptllts 116--thrombocytopenia of critical illness.  INR 1.47 coagulopathy of critical illness.  E coli bacteremia on verigene    Assessment/plan:   Septic shock:  source seems to be E coli bacteremia and colitis.  Continue zosyn; Given the ecoli bacteremia to explain her critical presentation my suspicion for acute c diff colitis has abated.  Will discharge PO vanco and IV flagyl today. Check EF on echo.  E coli bacteremia:  on zosyn.  Repeat blood cultures sent.  Suspect bowel translocation.  Colitis:  seen on CT scan and c/w symptoms of lower abdominal pain.  Abx as above. Overall this appears c/w prior presentations for abdominal pain with findings of colitis on CT, selin a prior admission in 2017 at [facility] during which she underwent flex sig which was unrevealing.    YOLANDA:  due to sepsis vs hypovolemia.  Fluid resuscitation and pressors to maintain map.      Clinically Significant Risk Factors Present on Admission              # Hypoalbuminemia: Lowest albumin = 3 g/dL at 6/1/2024  9:52 PM, will monitor as appropriate    # Coagulation Defect: INR = 1.47 (Ref range: 0.85 - 1.15) and/or PTT = 37 Seconds (Ref range: 22 - 38 Seconds), will monitor for bleeding  # Thrombocytopenia: Lowest platelets = 116 in last 2 days, will  "monitor for bleeding     # Circulatory Shock: required vasopressors within past 24 hours       # Obesity: Estimated body mass index is 33.64 kg/m  as calculated from the following:    Height as of this encounter: 1.473 m (4' 10\").    Weight as of this encounter: 73 kg (160 lb 15 oz).           # Anemia: based on hgb <11         I spent a total of 40 minutes (excluding procedure time) personally providing and directing critical care services at the bedside and on the critical care unit for this patient.     Param Krishna    "

## 2024-06-02 NOTE — PLAN OF CARE
ICU End of Shift Summary. See flowsheets for vital signs and detailed assessment.    Changes this shift: A/Ox4, lethargic, able to move all extremities. C/O abd pain, relieved by Dilaudid 0.2 mg x1. PIV Levophed 0.03-0.05 mcg/kg/min to reach MAP>65. SR/ST HR . Tmax 103.3 Tylenol given. SpO2.95% RA Appetite improving tolerated apple juice and apple sauce. Gram negative bacilli +BC x2 on 6/1. Eli with good urine output.    Plan: Goal Outcome Evaluation: Wean Levophed off.

## 2024-06-02 NOTE — PLAN OF CARE
ICU End of Shift Summary. See flowsheets for vital signs and detailed assessment.    Changes this shift: A & Ox4, drowsy in between cares. Continues to have abdominal pain rating 2-7, with movement pain is worse. Treated with PRN. Denies nausea. MAP >65, levophed titrated off. NO BM. Removed shen, voided after removal x1.     Plan:  Continue with plan of care and notify care team of any changes.       Goal Outcome Evaluation:      Plan of Care Reviewed With: patient    Overall Patient Progress: no changeOverall Patient Progress: no change    Outcome Evaluation: Continues to c/o of abdominal pain rating from 3-7 treated with IV pain medicaiton, currently off blood pressure medicaiton
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
## 2024-06-02 NOTE — PROGRESS NOTES
Brief Colorectal Surgery Note    Serial abdominal exam  Patient reports pain is slightly improved from prior.  Remains on low dose Norepi.   Sleeping in bed, awakens to voice, NAD  Abdomen is soft, tender across lower abdomen, no peritoneal signs    Continue plan per MICU    Ruthann Funk MD  General Surgery Resident

## 2024-06-02 NOTE — PROGRESS NOTES
MEDICAL ICU PROGRESS NOTE  06/02/2024      Date of Service (when I saw the patient): 06/02/2024    ASSESSMENT: Alisha Crowe is a 43 year old female with Hx of asthma, MDD, ADHD, PTSD, chronic pelvic pain (previously on chronic oxycodone) admitted 6/1/2024 for septic shock 2/2 E.Coli bacteremia and colitis.     CHANGES and MAJOR THINGS TODAY:   - Wean off norepi as able  - Discontinue Vanco and Flagyl  - Discontinue C.diff testing given no stools  - Discontinue shen catheter  - NPO until abdominal pain improves   - GI consult for further work up of unspecified colitis      PLAN:    Neuro:  # MDD  # PTSD  # ADHD  Previously on bupropion and mirtazapine, currently not taking due to finances  - PTA Ativan 2 mg TID PRN,  will continue for now, need to discuss chronic benzo use as outpatient     # Acute pain  - Acetaminophen 975 TID PRN  - Dilaudid 0.2 - 0.4 mg PRN     Pulmonary:  # Asthma  - Albuterol inhaler     Cardiovascular:  # Shock, likely septic  Persistent hypotensive despite 4 L of fluids. Differential includes hypovolemia vs septic vs cardiogenic.Met SIRS criteria with tachycardia and leukocytosis. High procal, normal lactate. Likely source of infection is colitis. Also has narrow pulse pressure thus unclear if cardiogenic shock is contributing.  - fluids: s/p NSS 1000 mL LR 1836 mL  - pressors: norepinephrine gtt via peripheral -- on very low dose, will reevaluate tomorrow if CVC is needed  - antibiotics and infectious work up as below  - TTE    GI/Nutrition:  # Abdominal pain  # Ascending Colitis  # Hx of unspecified colitis  Presents with pain to lower back and generalized abdominal pain. Transvaginal US with no acute findings. CT abdomen/pelvis with colitis involving the cecum and ascending colon. Differential considerations include infectious, inflammatory or possible ischemic etiology. Hx of previous hospitalization in 2017 with similar presentation, flex sig at that time unrevealing.    - CRS  consulted, appreciate recs  - NPO until abdominal pain resolves   - GI consult for hx of unspecified colitis and further work up, eval      # Constipation  - Senna, Miralax PRN    Renal/Fluids/Electrolytes:  # YOLANDA 2/2 septic shock, improving  # Hyponatremia  Cr 3.12 on admission (baseline 1 4/2024). Likely from prerenal YOLANDA from sepsis  - BMP BID to monitor Na, electrolytes  - Strict I&O monitoring  - Discontinue shen      Endocrine:  # No active concerns  - Currently euglycemic not requiring sliding scale insulin   - Hypoglycemia protocol      ID:  # Septic shock 2/2 E. Coli bacteremia  Presented with abdominal pain, CT A/P with colitis involving the cecum and ascending colon. Suspect gut translocation. Unclear history of unspecified colitis, will involve GI as per above.   - Tmax 103 overnight, WBC downtrending  - Blood cultures overnight with GNR, verigene with E.Coli  - Continue Zosyn while awaiting further speciation and sensitivities  - Discontinue Vanco and Flagyl given no stool, less likely C.Diff as source of infection     Antibiotics  Zosyn 6/1 -  present  IV Vanc 6/1- 6/2  PO Vanc 6/1 - 6/2  IV metrodinazole 6/1 - 6/2       Hematology:    # Leukocytosis  # Thrombocytopenia  WBC 29k. Likely due to leukemoid reaction  - Daily CBC  - Transfuse for Hgb < 7 or active signs of bleeding     Musculoskeletal:  - PT / OT consult       General Cares/Prophylaxis:    DVT Prophylaxis: Heparin SQ  GI Prophylaxis: NA  Restraints: NA  Family Communication: Will update if patient requests  Code Status: Full    Lines/tubes/drains:  - PIVs  - Central line  - Art line  - Discontinue Shen    Disposition:  - Medical ICU     Patient seen and findings/plan discussed with medical ICU staff, Dr. Krishna.    Ruthann Ortega, APRN CNP    43 minutes of critical care time    Clinically Significant Risk Factors Present on Admission              # Hypoalbuminemia: Lowest albumin = 3 g/dL at 6/1/2024  9:52 PM, will monitor as  "appropriate  # Coagulation Defect: INR = 1.47 (Ref range: 0.85 - 1.15) and/or PTT = 37 Seconds (Ref range: 22 - 38 Seconds), will monitor for bleeding  # Thrombocytopenia: Lowest platelets = 116 in last 2 days, will monitor for bleeding     # Circulatory Shock: required vasopressors within past 24 hours       # Obesity: Estimated body mass index is 33.64 kg/m  as calculated from the following:    Height as of this encounter: 1.473 m (4' 10\").    Weight as of this encounter: 73 kg (160 lb 15 oz).           # Anemia: based on hgb <11             ====================================  INTERVAL HISTORY:   No acute events overnight. Had Tmax of 103.3 around midnight, blood cultures positive for GNBs, verigene with E.Coli. Continues to report lower back pain and generalized abdominal pain. Curled up in bed reporting severe pain. Was able to sleep through most of night but wakes up in pain. Weaning off NE.     OBJECTIVE:   1. VITAL SIGNS:   Temp:  [98.2  F (36.8  C)-103.3  F (39.6  C)] 98.3  F (36.8  C)  Pulse:  [] 87  Resp:  [0-36] 32  BP: ()/() 92/65  MAP:  [54 mmHg-83 mmHg] 61 mmHg  Arterial Line BP: ()/(40-66) 89/48  SpO2:  [91 %-100 %] 95 %  Resp: (!) 32    2. INTAKE/ OUTPUT:   I/O last 3 completed shifts:  In: 226.72 [P.O.:30; I.V.:196.72]  Out: 1835 [Urine:1835]    3. PHYSICAL EXAMINATION:  General: Appears uncomfortable, NAD  HEENT: Normocephalic, atraumatic. PERRL. Mucous membranes moist, intact.   Neuro: Drowsy, oriented x3, moving all extremities. No focal neuro deficit.   Pulm/Resp: Clear breath sounds bilaterally without rhonchi, crackles or wheeze, breathing non-labored  CV: RRR, S1S2, no murmur, rub, or gallop  Abdomen: Soft, non-distended, generalized tenderness throughout, active bowel sounds  :  shen catheter in place, urine yellow and clear  Ext: Pulses 2+ radial, pedal  Incisions/Skin: Flushed, warm, no rashes or lesions.     4. LABS:   Arterial Blood Gases   Recent Labs   Lab " 06/01/24  1501   PH 7.40   PCO2 32*   PO2 67*   HCO3 20*     Complete Blood Count   Recent Labs   Lab 06/02/24  0508 06/01/24  1209 06/01/24  1201   WBC 22.2*  --  29.6*   HGB 10.6* 10.9* 11.1*   *  --  142*     Basic Metabolic Panel  Recent Labs   Lab 06/02/24  0508 06/01/24  2355 06/01/24  2209 06/01/24  2152 06/01/24  1609 06/01/24  1501 06/01/24  1500 06/01/24  1211 06/01/24  1209 06/01/24  1201   *  --   --  134*  --   --   --   --  134* 133*   POTASSIUM 3.7  --   --  3.9  --   --   --   --  3.9 4.2   CHLORIDE 106  --   --  105  --   --   --   --   --  101   CO2 18*  --   --  18*  --   --   --   --   --  21*   BUN 29.8*  --   --  29.6*  --   --   --   --   --  30.5*   CR 2.23*  --   --  2.45*  --  2.61*  --  3.2*  --  3.12*   * 84 90 103*   < >  --    < >  --  95 107*    < > = values in this interval not displayed.     Liver Function Tests  Recent Labs   Lab 06/01/24 2152 06/01/24  1201   AST 74* 75*   ALT 66* 70*   ALKPHOS 87 64   BILITOTAL 0.4 0.3   ALBUMIN 3.0* 3.2*   INR  --  1.47*     Coagulation Profile  Recent Labs   Lab 06/01/24  1201   INR 1.47*   PTT 37       5. RADIOLOGY:   Recent Results (from the past 24 hour(s))   POC US ABDOMEN LIMITED    Impression    Bedside FAST (Focused Assessment with Sonography in Trauma), performed and interpreted by me.   Indication: Abdominal pain and Hypotension    With the patient in Trendelenburg, the RUQ, LUQ and subxiphoid views were examined for intraabdominal and thoracic free fluid and pericardial effusion. With the patient in reverse Trendelenburg, the suprapubic view was examined for intraabdominal free fluid. Image quality was satisfactory..     Findings: There is no evidence of free fluid above or below bilateral diaphragms, in the splenorenal or hepatorenal space, or in bilateral paracolic gutters. There was no free fluid seen in the pelvis adjacent to the urinary bladder. There is no free fluid within the pericardium.        IMPRESSION:   Negative FAST    CT Abdomen Pelvis w/o Contrast    Narrative    EXAMINATION: CT ABDOMEN PELVIS W/O CONTRAST, 6/1/2024 12:57 PM    INDICATION: hypotension, lower abd pain, jordan    COMPARISON STUDY: None    TECHNIQUE: CT scan of the abdomen and pelvis was performed on  multidetector CT scanner using volumetric acquisition technique and  images were reconstructed in multiple planes with variable thickness  and reviewed on dedicated workstations.     CONTRAST: Without contrast.    CT scan radiation dose is optimized to minimum requisite dose using  automated dose modulation techniques.    FINDINGS:    Lower thorax: Trace pleural effusions with overlying subsegmental  bibasilar atelectasis. Left infrahilar pulmonary cyst.    Liver: Enlarged measuring 18.5 cm in craniocaudal dimension. No mass.  No intrahepatic biliary ductal dilation.    Biliary System: Status post cholecystectomy. No extrahepatic biliary  ductal dilation.    Pancreas: No pancreatic ductal dilation.    Adrenal glands: No mass or nodules    Spleen: Normal.    Kidneys: 2 mm left renal parenchymal calculus. No obstructing calculus  or hydronephrosis.    Gastrointestinal tract: Normal caliber bowel.  Edematous colonic bowel  wall thickening involving the cecum and ascending colon with adjacent  fat stranding and small amount of free fluid. The transverse and  descending colon appear unremarkable.    Mesentery/peritoneum/retroperitoneum: No free fluid or air.    Lymph nodes: No significant lymphadenopathy.    Vasculature: Nonaneurysmal abdominal aorta.    Pelvis: Urinary bladder is normal.    Osseous structures: No aggressive or acute osseous lesion.      Soft tissues: Within normal limits.      Impression    IMPRESSION:   Examination is limited due to lack of IV contrast. Within the  limitations of the examination, findings are concerning for colitis  with bowel wall thickening and adjacent inflammatory changes involving  the cecum and ascending colon.  Differential considerations include  infectious, inflammatory or possible ischemic etiology given history  of hypotension.    I have personally reviewed the examination and initial interpretation  and I agree with the findings.    RICARDO BAXTER MD         SYSTEM ID:  [id number]   US Pelvic Complete with Transvaginal    Narrative    EXAMINATION: US PELVIC TRANSABDOMINAL AND TRANSVAGINAL, 6/1/2024 5:19  PM     COMPARISON: 6/1/2024    HISTORY: concern for ovarian torsion    TECHNIQUE: The was scanned in standard fashion with transabdominal and  transvaginal transducer(s) using grey scale, color Doppler, and  spectral flow techniques.    FINDINGS:  Both ovaries are normal in size and there is normal blood flow to both  ovaries.  No evidence of an adnexal mass.  The right ovary measures  3.0 x 1.8 x 2.3 cm and the left ovary measures 3.6 x 1.8 x 2.8 cm.    The uterus measures 8.7 x 5.0 x 3.8 cm, and there is no evidence of a  focal fibroid. Endometrium was not well visualized. There is no free  fluid in the pelvis.      Impression    IMPRESSION: Normal pelvic ultrasound.  No sonographic evidence of  ovarian torsion.    I have personally reviewed the examination and initial interpretation  and I agree with the findings.    RICARDO BAXTER MD         SYSTEM ID:  [id number]

## 2024-06-03 ENCOUNTER — APPOINTMENT (OUTPATIENT)
Dept: OCCUPATIONAL THERAPY | Facility: CLINIC | Age: 44
DRG: 871 | End: 2024-06-03

## 2024-06-03 LAB
ANION GAP SERPL CALCULATED.3IONS-SCNC: 10 MMOL/L (ref 7–15)
ATRIAL RATE - MUSE: 82 BPM
ATRIAL RATE - MUSE: 90 BPM
BASOPHILS # BLD AUTO: 0 10E3/UL (ref 0–0.2)
BASOPHILS NFR BLD AUTO: 0 %
BUN SERPL-MCNC: 23.5 MG/DL (ref 6–20)
CALCIUM SERPL-MCNC: 8.6 MG/DL (ref 8.6–10)
CHLORIDE SERPL-SCNC: 110 MMOL/L (ref 98–107)
CREAT SERPL-MCNC: 1.74 MG/DL (ref 0.51–0.95)
DEPRECATED HCO3 PLAS-SCNC: 19 MMOL/L (ref 22–29)
DIASTOLIC BLOOD PRESSURE - MUSE: NORMAL MMHG
DIASTOLIC BLOOD PRESSURE - MUSE: NORMAL MMHG
EGFRCR SERPLBLD CKD-EPI 2021: 37 ML/MIN/1.73M2
EOSINOPHIL # BLD AUTO: 0.1 10E3/UL (ref 0–0.7)
EOSINOPHIL NFR BLD AUTO: 1 %
ERYTHROCYTE [DISTWIDTH] IN BLOOD BY AUTOMATED COUNT: 14.1 % (ref 10–15)
GLUCOSE BLDC GLUCOMTR-MCNC: 86 MG/DL (ref 70–99)
GLUCOSE BLDC GLUCOMTR-MCNC: 91 MG/DL (ref 70–99)
GLUCOSE SERPL-MCNC: 105 MG/DL (ref 70–99)
HCT VFR BLD AUTO: 29.4 % (ref 35–47)
HGB BLD-MCNC: 9.9 G/DL (ref 11.7–15.7)
IMM GRANULOCYTES # BLD: 0.1 10E3/UL
IMM GRANULOCYTES NFR BLD: 1 %
INTERPRETATION ECG - MUSE: NORMAL
INTERPRETATION ECG - MUSE: NORMAL
LYMPHOCYTES # BLD AUTO: 0.7 10E3/UL (ref 0.8–5.3)
LYMPHOCYTES NFR BLD AUTO: 4 %
MCH RBC QN AUTO: 29.7 PG (ref 26.5–33)
MCHC RBC AUTO-ENTMCNC: 33.7 G/DL (ref 31.5–36.5)
MCV RBC AUTO: 88 FL (ref 78–100)
MONOCYTES # BLD AUTO: 0.6 10E3/UL (ref 0–1.3)
MONOCYTES NFR BLD AUTO: 4 %
NEUTROPHILS # BLD AUTO: 14 10E3/UL (ref 1.6–8.3)
NEUTROPHILS NFR BLD AUTO: 90 %
NRBC # BLD AUTO: 0 10E3/UL
NRBC BLD AUTO-RTO: 0 /100
P AXIS - MUSE: 56 DEGREES
P AXIS - MUSE: 72 DEGREES
PLATELET # BLD AUTO: 107 10E3/UL (ref 150–450)
POTASSIUM SERPL-SCNC: 3.7 MMOL/L (ref 3.4–5.3)
PR INTERVAL - MUSE: 146 MS
PR INTERVAL - MUSE: 152 MS
QRS DURATION - MUSE: 82 MS
QRS DURATION - MUSE: 90 MS
QT - MUSE: 364 MS
QT - MUSE: 364 MS
QTC - MUSE: 414 MS
QTC - MUSE: 445 MS
R AXIS - MUSE: 0 DEGREES
R AXIS - MUSE: 26 DEGREES
RBC # BLD AUTO: 3.33 10E6/UL (ref 3.8–5.2)
SODIUM SERPL-SCNC: 139 MMOL/L (ref 135–145)
SYSTOLIC BLOOD PRESSURE - MUSE: NORMAL MMHG
SYSTOLIC BLOOD PRESSURE - MUSE: NORMAL MMHG
T AXIS - MUSE: 21 DEGREES
T AXIS - MUSE: 27 DEGREES
VENTRICULAR RATE- MUSE: 78 BPM
VENTRICULAR RATE- MUSE: 90 BPM
WBC # BLD AUTO: 15.5 10E3/UL (ref 4–11)

## 2024-06-03 PROCEDURE — 87040 BLOOD CULTURE FOR BACTERIA: CPT

## 2024-06-03 PROCEDURE — 120N000002 HC R&B MED SURG/OB UMMC

## 2024-06-03 PROCEDURE — 250N000011 HC RX IP 250 OP 636: Performed by: INTERNAL MEDICINE

## 2024-06-03 PROCEDURE — 97530 THERAPEUTIC ACTIVITIES: CPT | Mod: GO | Performed by: OCCUPATIONAL THERAPIST

## 2024-06-03 PROCEDURE — 80048 BASIC METABOLIC PNL TOTAL CA: CPT

## 2024-06-03 PROCEDURE — 36415 COLL VENOUS BLD VENIPUNCTURE: CPT

## 2024-06-03 PROCEDURE — 93010 ELECTROCARDIOGRAM REPORT: CPT | Performed by: INTERNAL MEDICINE

## 2024-06-03 PROCEDURE — 250N000011 HC RX IP 250 OP 636

## 2024-06-03 PROCEDURE — 250N000013 HC RX MED GY IP 250 OP 250 PS 637

## 2024-06-03 PROCEDURE — 85025 COMPLETE CBC W/AUTO DIFF WBC: CPT

## 2024-06-03 PROCEDURE — 999N000147 HC STATISTIC PT IP EVAL DEFER: Performed by: PHYSICAL THERAPIST

## 2024-06-03 PROCEDURE — 250N000013 HC RX MED GY IP 250 OP 250 PS 637: Performed by: STUDENT IN AN ORGANIZED HEALTH CARE EDUCATION/TRAINING PROGRAM

## 2024-06-03 PROCEDURE — 93005 ELECTROCARDIOGRAM TRACING: CPT

## 2024-06-03 PROCEDURE — 99232 SBSQ HOSP IP/OBS MODERATE 35: CPT | Performed by: SURGERY

## 2024-06-03 PROCEDURE — 99233 SBSQ HOSP IP/OBS HIGH 50: CPT | Mod: FS

## 2024-06-03 PROCEDURE — 97165 OT EVAL LOW COMPLEX 30 MIN: CPT | Mod: GO | Performed by: OCCUPATIONAL THERAPIST

## 2024-06-03 RX ORDER — PIPERACILLIN SODIUM, TAZOBACTAM SODIUM 3; .375 G/15ML; G/15ML
3.38 INJECTION, POWDER, LYOPHILIZED, FOR SOLUTION INTRAVENOUS EVERY 6 HOURS
Status: DISCONTINUED | OUTPATIENT
Start: 2024-06-03 | End: 2024-06-03

## 2024-06-03 RX ORDER — POLYETHYLENE GLYCOL 3350 17 G/17G
17 POWDER, FOR SOLUTION ORAL 3 TIMES DAILY
Status: DISCONTINUED | OUTPATIENT
Start: 2024-06-03 | End: 2024-06-04

## 2024-06-03 RX ORDER — AMOXICILLIN 250 MG
1 CAPSULE ORAL 2 TIMES DAILY
Status: DISCONTINUED | OUTPATIENT
Start: 2024-06-03 | End: 2024-06-04

## 2024-06-03 RX ORDER — HYDROMORPHONE HCL IN WATER/PF 6 MG/30 ML
.2-.5 PATIENT CONTROLLED ANALGESIA SYRINGE INTRAVENOUS EVERY 6 HOURS PRN
Status: DISCONTINUED | OUTPATIENT
Start: 2024-06-03 | End: 2024-06-03

## 2024-06-03 RX ORDER — AMOXICILLIN 250 MG
2 CAPSULE ORAL 2 TIMES DAILY
Status: DISCONTINUED | OUTPATIENT
Start: 2024-06-03 | End: 2024-06-04

## 2024-06-03 RX ORDER — OXYCODONE HYDROCHLORIDE 5 MG/1
5-10 TABLET ORAL EVERY 4 HOURS PRN
Status: DISCONTINUED | OUTPATIENT
Start: 2024-06-03 | End: 2024-06-05

## 2024-06-03 RX ORDER — ACETAMINOPHEN 325 MG/1
975 TABLET ORAL EVERY 8 HOURS
Status: DISCONTINUED | OUTPATIENT
Start: 2024-06-03 | End: 2024-06-08 | Stop reason: HOSPADM

## 2024-06-03 RX ORDER — PRAZOSIN HYDROCHLORIDE 2 MG/1
2 CAPSULE ORAL AT BEDTIME
Status: ON HOLD | COMMUNITY
Start: 2024-05-17 | End: 2024-06-07

## 2024-06-03 RX ORDER — PIPERACILLIN SODIUM, TAZOBACTAM SODIUM 4; .5 G/20ML; G/20ML
4.5 INJECTION, POWDER, LYOPHILIZED, FOR SOLUTION INTRAVENOUS EVERY 6 HOURS
Status: DISCONTINUED | OUTPATIENT
Start: 2024-06-03 | End: 2024-06-05

## 2024-06-03 RX ORDER — POLYETHYLENE GLYCOL 3350 17 G/17G
17 POWDER, FOR SOLUTION ORAL DAILY
Status: DISCONTINUED | OUTPATIENT
Start: 2024-06-03 | End: 2024-06-03

## 2024-06-03 RX ORDER — MIRTAZAPINE 30 MG/1
60 TABLET, FILM COATED ORAL AT BEDTIME
Status: ON HOLD | COMMUNITY
Start: 2024-05-17 | End: 2024-06-07

## 2024-06-03 RX ORDER — HYDROMORPHONE HCL IN WATER/PF 6 MG/30 ML
.2-.4 PATIENT CONTROLLED ANALGESIA SYRINGE INTRAVENOUS EVERY 6 HOURS PRN
Status: DISCONTINUED | OUTPATIENT
Start: 2024-06-03 | End: 2024-06-06

## 2024-06-03 RX ORDER — CIPROFLOXACIN 250 MG/1
250 TABLET, FILM COATED ORAL EVERY 12 HOURS SCHEDULED
Status: DISCONTINUED | OUTPATIENT
Start: 2024-06-03 | End: 2024-06-03

## 2024-06-03 RX ORDER — CLONIDINE HYDROCHLORIDE 0.1 MG/1
0.1 TABLET ORAL 2 TIMES DAILY PRN
Status: ON HOLD | COMMUNITY
Start: 2024-01-23 | End: 2024-06-07

## 2024-06-03 RX ORDER — LORAZEPAM 2 MG/1
2 TABLET ORAL 3 TIMES DAILY PRN
Status: ON HOLD | COMMUNITY
Start: 2024-05-17 | End: 2024-06-07

## 2024-06-03 RX ORDER — BUPROPION HYDROCHLORIDE 150 MG/1
1 TABLET, EXTENDED RELEASE ORAL DAILY
Status: ON HOLD | COMMUNITY
Start: 2024-05-17 | End: 2024-06-07

## 2024-06-03 RX ADMIN — PIPERACILLIN SODIUM AND TAZOBACTAM SODIUM 3.38 G: 3; .375 INJECTION, POWDER, LYOPHILIZED, FOR SOLUTION INTRAVENOUS at 12:09

## 2024-06-03 RX ADMIN — PIPERACILLIN SODIUM AND TAZOBACTAM SODIUM 3.38 G: 3; .375 INJECTION, POWDER, LYOPHILIZED, FOR SOLUTION INTRAVENOUS at 00:22

## 2024-06-03 RX ADMIN — OXYCODONE HYDROCHLORIDE 5 MG: 5 TABLET ORAL at 16:06

## 2024-06-03 RX ADMIN — HEPARIN SODIUM 5000 UNITS: 5000 INJECTION, SOLUTION INTRAVENOUS; SUBCUTANEOUS at 08:24

## 2024-06-03 RX ADMIN — HYDROMORPHONE HYDROCHLORIDE 0.2 MG: 0.2 INJECTION, SOLUTION INTRAMUSCULAR; INTRAVENOUS; SUBCUTANEOUS at 17:21

## 2024-06-03 RX ADMIN — OXYCODONE HYDROCHLORIDE 5 MG: 5 TABLET ORAL at 02:14

## 2024-06-03 RX ADMIN — OXYCODONE HYDROCHLORIDE 5 MG: 5 TABLET ORAL at 12:16

## 2024-06-03 RX ADMIN — DOCUSATE SODIUM 50 MG AND SENNOSIDES 8.6 MG 1 TABLET: 8.6; 5 TABLET, FILM COATED ORAL at 19:38

## 2024-06-03 RX ADMIN — HEPARIN SODIUM 5000 UNITS: 5000 INJECTION, SOLUTION INTRAVENOUS; SUBCUTANEOUS at 16:05

## 2024-06-03 RX ADMIN — PIPERACILLIN SODIUM AND TAZOBACTAM SODIUM 3.38 G: 3; .375 INJECTION, POWDER, LYOPHILIZED, FOR SOLUTION INTRAVENOUS at 05:14

## 2024-06-03 RX ADMIN — HEPARIN SODIUM 5000 UNITS: 5000 INJECTION, SOLUTION INTRAVENOUS; SUBCUTANEOUS at 00:23

## 2024-06-03 RX ADMIN — PIPERACILLIN AND TAZOBACTAM 4.5 G: 4; .5 INJECTION, POWDER, FOR SOLUTION INTRAVENOUS at 18:08

## 2024-06-03 RX ADMIN — POLYETHYLENE GLYCOL 3350 17 G: 17 POWDER, FOR SOLUTION ORAL at 13:21

## 2024-06-03 RX ADMIN — HYDROMORPHONE HYDROCHLORIDE 0.2 MG: 0.2 INJECTION, SOLUTION INTRAMUSCULAR; INTRAVENOUS; SUBCUTANEOUS at 08:46

## 2024-06-03 RX ADMIN — OXYCODONE HYDROCHLORIDE 5 MG: 5 TABLET ORAL at 21:52

## 2024-06-03 RX ADMIN — POLYETHYLENE GLYCOL 3350 17 G: 17 POWDER, FOR SOLUTION ORAL at 19:38

## 2024-06-03 ASSESSMENT — ACTIVITIES OF DAILY LIVING (ADL)
PREVIOUS_RESPONSIBILITIES: MEAL PREP;HOUSEKEEPING;LAUNDRY;SHOPPING;YARDWORK;MEDICATION MANAGEMENT;FINANCES;DRIVING;WORK
ADLS_ACUITY_SCORE: 32
ADLS_ACUITY_SCORE: 30
ADLS_ACUITY_SCORE: 31
ADLS_ACUITY_SCORE: 32
ADLS_ACUITY_SCORE: 31
ADLS_ACUITY_SCORE: 25
ADLS_ACUITY_SCORE: 25
ADLS_ACUITY_SCORE: 31
ADLS_ACUITY_SCORE: 32
ADLS_ACUITY_SCORE: 25
ADLS_ACUITY_SCORE: 25
ADLS_ACUITY_SCORE: 30
ADLS_ACUITY_SCORE: 30
ADLS_ACUITY_SCORE: 25
ADLS_ACUITY_SCORE: 30
ADLS_ACUITY_SCORE: 30
ADLS_ACUITY_SCORE: 32
ADLS_ACUITY_SCORE: 31
ADLS_ACUITY_SCORE: 31
ADLS_ACUITY_SCORE: 25
ADLS_ACUITY_SCORE: 25

## 2024-06-03 NOTE — PROGRESS NOTES
Goal Outcome Evaluation:         Transferred from:  4C  Report received from:     Kanchan Ascencio      2 RN skin assessment completed by: Pratik Gomez and Eleni Bryan Findings (add LDA if needed): Scattered bruising  Care plan (primary problem) and education initiated if not already done: NA  MDRO education done if applicable: NA  Pt informed about policy regarding no IV pumps off unit: YES  Suction set up in room?  Flu shot ordered? (October-April only): NA  Detailed Belongings:         Cell phone and   Brown sandle shoes  Black hoodie  Other clothing pt did not care to specify on.

## 2024-06-03 NOTE — PLAN OF CARE
PT: Orders received. Chart reviewed and discussed with care team.  Per review and discussion with OT, PT not indicated due to pt lacking skilled need for PT services. Per OT pt limited by pain but presents with good strength to mobilize, required only assist for lines. Pt previously very IND. OT anticipated good progress once pt's pain is improved. Defer discharge recommendations to OT.  Will complete orders.

## 2024-06-03 NOTE — PLAN OF CARE
Goal Outcome Evaluation:    ICU End of Shift Summary. See flowsheets for vital signs and detailed assessment.    Changes this shift: A/Ox4, lethargic, able to move all extremities. C/O abd pain, relieved by Dilaudid 0.2 mg x1. and Oxycodone 5 mg x1. Meeting MAP>65 off pressors. SR HR 70-90. Afebrile. SpO2.95% RA.  Voiding adequately in bedside commode. Refusing NPO status drinking clear liquids.    Plan:       Plan of Care Reviewed With: patient    Overall Patient Progress: improving
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
## 2024-06-03 NOTE — PLAN OF CARE
Goal Outcome Evaluation:      Plan of Care Reviewed With: patient    Overall Patient Progress: no changeOverall Patient Progress: no change    Outcome Evaluation: Pt admitted on 6/1 for abd pain and septic shock. On IV zsyn for ongoing E Coli bacteremia. CRS following, pt diet advanced to CLD today and pt hoping it can advance further. Pt does have poor appeite. Dealing with a lot of abd pain, PRN PO oxy and IV dilaudid in use with minimal effect. Pt last BM 5/29, bowel regiment titrated up this shay (TID miralax and BID senna). Pt up with A1 to toilet, voided on shift and verbalized passing gas. Pt YOLANDA improving, Cr downtredning to 1.74 today. Pt transferred from  today, art line remved in R femoral location. Pressure tape currently covering site. TTE done on 6/1- WNL. Pt needs to complete 10-14 day IV abx coarse and have OP colonoscopy in 6 weeks

## 2024-06-03 NOTE — PROGRESS NOTES
MEDICAL ICU PROGRESS NOTE  06/03/2024      Date of Service (when I saw the patient): 06/03/2024    ASSESSMENT: Alisha Crowe is a 43 year old female with Hx of asthma, MDD, ADHD, PTSD, chronic pelvic pain (previously on chronic oxycodone) admitted 6/1/2024 for septic shock 2/2 E.Coli bacteremia and colitis.     CHANGES and MAJOR THINGS TODAY:   - Colorectal surgery okay with CLD   - Scheduled Tylenol   - Reduced frequency of PRN dilaudid   - Repeat blood cx  - Continue Zosyn   - Scheduled bowel regimen   - Transfer to Medicine       PLAN:    Neuro:  # MDD  # PTSD  # ADHD  Previously on bupropion and mirtazapine, currently not taking due to finances  - PTA Ativan 2 mg TID PRN,  will continue for now, need to discuss chronic benzo use as outpatient     # Acute pain  - Acetaminophen 975 TID  - Dilaudid 0.2 - 0.4 mg PRN reduced to q6h   - Oxycodone 5 mg q4h prn     Pulmonary:  # Asthma  - Albuterol inhaler     Cardiovascular:  # Shock, likely septic > improved   Persistent hypotensive despite 4 L of fluids. Differential includes hypovolemia vs septic vs cardiogenic. Met SIRS criteria with tachycardia and leukocytosis. High procal, normal lactate. Likely source of infection is colitis. Also has narrow pulse pressure thus unclear if cardiogenic shock is contributing.  - Off pressors since 6/2  - antibiotics and infectious work up as below  - TTE on 6/1, within normal limits     GI/Nutrition:  # Abdominal pain  # Ascending Colitis  # Hx of unspecified colitis  Presented with pain to lower back and generalized abdominal pain. Transvaginal US with no acute findings. CT abdomen/pelvis with colitis involving the cecum and ascending colon. Differential considerations include infectious, inflammatory or possible ischemic etiology. Hx of previous hospitalization in 2017 with similar presentation, flex sig at that time unrevealing.    - CRS consulted, appreciate recs   > Okay for clear liquid diet until abdominal pain resolves    - GI consult for hx of unspecified colitis and further work up, eval    > If elevated lactate, or worsening abdominal pain with further concerns for mesenteric ischemia, could consider MRA or CTA  > Colonoscopy as outpatient  - Abdominal US with dopplers: could not evaluate mesentaric artery otherwise negative study      # Constipation  - Senna, Miralax scheduled     Renal/Fluids/Electrolytes:  # YOLANDA 2/2 septic shock, improving  # Hyponatremia, resolved   Cr 3.12 on admission (baseline 1 4/2024). Likely from prerenal YOLANDA from sepsis  - BMP daily   - Strict I&O monitoring      Endocrine:  # No active concerns  - Currently euglycemic not requiring sliding scale insulin   - Hypoglycemia protocol      ID:  # Septic shock 2/2 E. Coli bacteremia    Presented with abdominal pain, CT A/P with colitis involving the cecum and ascending colon. Suspect gut translocation. Unclear history of unspecified colitis, will involve GI as per above.   - Blood cultures overnight with GNR, verigene with E.Coli  - Repeat blood cx   - Continue Zosyn until bacteremia cleared, otherwise could be switched to PO in oncoming days      Antibiotics  Zosyn 6/1 -  present  IV Vanc 6/1- 6/2  PO Vanc 6/1 - 6/2  IV metrodinazole 6/1 - 6/2       Hematology:    # Leukocytosis >improved   # Thrombocytopenia  WBC 29k. Likely due to leukemoid reaction  - Daily CBC  - Transfuse for Hgb < 7 or active signs of bleeding     Musculoskeletal:  - PT / OT consult       General Cares/Prophylaxis:    DVT Prophylaxis: Heparin SQ  GI Prophylaxis: NA  Restraints: NA  Family Communication: Will update if patient requests  Code Status: Full    Lines/tubes/drains:  - PIVs  - Art line (remove)     Disposition:  - Transfer to Medicine team    Patient seen and findings/plan discussed with medical ICU staff, Dr. Morales.    Nenita Zamarripa, APRN CNP    43 minutes of total patient care time     Clinically Significant Risk Factors              # Hypoalbuminemia: Lowest  "albumin = 3 g/dL at 6/1/2024  9:52 PM, will monitor as appropriate    # Coagulation Defect: INR = 1.47 (Ref range: 0.85 - 1.15) and/or PTT = 37 Seconds (Ref range: 22 - 38 Seconds), will monitor for bleeding  # Thrombocytopenia: Lowest platelets = 107 in last 2 days, will monitor for bleeding          # Obesity: Estimated body mass index is 31.94 kg/m  as calculated from the following:    Height as of this encounter: 1.473 m (4' 10\").    Weight as of this encounter: 69.3 kg (152 lb 12.8 oz)., PRESENT ON ADMISSION               ====================================  INTERVAL HISTORY:   No acute events overnight. Remains off pressors, abdominal pain continue today, unchanged.     OBJECTIVE:   1. VITAL SIGNS:   Temp:  [98  F (36.7  C)-98.8  F (37.1  C)] 98  F (36.7  C)  Pulse:  [66-97] 92  Resp:  [9-46] 27  BP: ()/(49-74) 108/58  MAP:  [59 mmHg-86 mmHg] 78 mmHg  Arterial Line BP: ()/(46-68) 112/64  SpO2:  [91 %-98 %] 92 %  Resp: 27    2. INTAKE/ OUTPUT:   I/O last 3 completed shifts:  In: 1249.93 [P.O.:680; I.V.:569.93]  Out: 2695 [Urine:2695]    3. PHYSICAL EXAMINATION:  General: Appears uncomfortable, NAD  HEENT: Normocephalic, atraumatic. PERRL. Mucous membranes moist, intact.   Neuro: Drowsy, oriented x3, moving all extremities. No focal neuro deficit.   Pulm/Resp: Clear breath sounds bilaterally without rhonchi, crackles or wheeze, breathing non-labored  CV: RRR, S1S2, no murmur, rub, or gallop  Abdomen: Soft, non-distended, generalized tenderness throughout, active bowel sounds  :  shen catheter in place, urine yellow and clear  Ext: Pulses 2+ radial, pedal  Incisions/Skin: Flushed, warm, no rashes or lesions.     4. LABS:   Arterial Blood Gases   Recent Labs   Lab 06/01/24  1501   PH 7.40   PCO2 32*   PO2 67*   HCO3 20*     Complete Blood Count   Recent Labs   Lab 06/03/24  0507 06/02/24  0508 06/01/24  1209 06/01/24  1201   WBC 15.5* 22.2*  --  29.6*   HGB 9.9* 10.6* 10.9* 11.1*   * 116*  " --  142*     Basic Metabolic Panel  Recent Labs   Lab 24  0507 24  1548 24  0508 24  2355 249 24    139 134*  --   --  134*   POTASSIUM 3.7 3.6 3.7  --   --  3.9   CHLORIDE 110* 110* 106  --   --  105   CO2 19* 18* 18*  --   --  18*   BUN 23.5* 25.8* 29.8*  --   --  29.6*   CR 1.74* 1.87* 2.23*  --   --  2.45*   * 99 109* 84   < > 103*    < > = values in this interval not displayed.     Liver Function Tests  Recent Labs   Lab 24  1201   AST 74* 75*   ALT 66* 70*   ALKPHOS 87 64   BILITOTAL 0.4 0.3   ALBUMIN 3.0* 3.2*   INR  --  1.47*     Coagulation Profile  Recent Labs   Lab 24  1201   INR 1.47*   PTT 37       5. RADIOLOGY:   Recent Results (from the past 24 hour(s))   Echo Complete   Result Value    LVEF  60-65%    Narrative    [id number]  [id number]  [id number]  [id number]^MANISHA^BEN     [facility],[place]  Echocardiography Laboratory  [address] [place] [id number]     Name: ROBER MARCIAL  MRN: [id number]  : 1980  Study Date: 2024 08:18 AM  Age: 43 yrs  Gender: Female  Patient Location: [facility] [place]  Reason For Study: Heart Failure  Ordering Physician: BEN DYER  Performed By: Nick Duran RDCS     BSA: 1.7 m2  Height: 58 in  Weight: 160 lb  HR: 78  BP: 82/56 mmHg  ______________________________________________________________________________  Procedure  Complete Portable Echo Adult.  ______________________________________________________________________________  Interpretation Summary  Left ventricular size, wall motion and function are normal. The ejection  fraction is 60-65%.  Left ventricular diastolic function is normal.  Right ventricular function, chamber size, wall motion, and thickness are  normal.  Dilation of the inferior vena cava is present with abnormal respiratory  variation in diameter.  There is no prior study for direct  comparison.  ______________________________________________________________________________  Left Ventricle  Left ventricular size, wall motion and function are normal. The ejection  fraction is 60-65%. Left ventricular diastolic function is normal.     Right Ventricle  Right ventricular function, chamber size, wall motion, and thickness are  normal.     Atria  The right atria appears normal. Mild left atrial enlargement is present.     Mitral Valve  The mitral valve is normal. Trace mitral insufficiency is present.     Aortic Valve  Aortic valve is normal in structure and function. The aortic valve is  tricuspid.     Tricuspid Valve  The tricuspid valve is normal. Trace tricuspid insufficiency is present.  Pulmonary artery systolic pressure is normal.     Pulmonic Valve  The pulmonic valve is normal. Trace pulmonic insufficiency is present.     Vessels  Normal diameter aortic root and proximal ascending aorta. Dilation of the  inferior vena cava is present with abnormal respiratory variation in diameter.     Pericardium  No pericardial effusion is present.     Compared to Previous Study  There is no prior study for direct comparison.  ______________________________________________________________________________  MMode/2D Measurements & Calculations     IVSd: 0.95 cm  LVIDd: 4.2 cm  LVIDs: 2.8 cm  LVPWd: 0.94 cm  FS: 34.0 %  LV mass(C)d: 128.3 grams  LV mass(C)dI: 77.5 grams/m2  asc Aorta Diam: 3.0 cm  Asc Ao diam index BSA (cm/m2): 1.8  Asc Ao diam index Ht(cm/m): 2.0  LA Volume Index (BP): 32.6 ml/m2  RWT: 0.45  TAPSE: 2.3 cm     Doppler Measurements & Calculations  MV E max zoë: 106.6 cm/sec  MV A max zoë: 63.5 cm/sec  MV E/A: 1.7  MV dec slope: 547.1 cm/sec2  MV dec time: 0.19 sec  PA acc time: 0.07 sec  E/E' av.9  Lateral E/e': 7.9  Medial E/e': 11.9     ______________________________________________________________________________  Report approved by: Puneet Regan 2024 11:36 AM         US  Abdominal Doppler Complete    Narrative    ULTRASOUND ABDOMEN OR PELVIS ARTERIAL AND VENOUS DOPPLER 6/2/2024 3:40  PM    CLINICAL HISTORY: assess mesenteric arteries/veins r/o mesenteric  ischemia in the setting of pain out of proportion of the abdomen,  septic shock from E.coli bacteremia..     COMPARISONS: CT abdomen and pelvis without contrast 6/1/2024    REFERRING PROVIDER: FREDDIE FRANKLIN    TECHNIQUE: Aorta and mesenteric arteries evaluated with grayscale,  color Doppler, and spectral pulsed wave Doppler ultrasound.    Inferior vena cava, hepatic, portal, superior mesenteric, and splenic  veins evaluated with grayscale, color Doppler, and spectral pulsed  wave Doppler ultrasound.    FINDINGS:   Aorta, proximal: 2.0 cm, 149/0 cm/s, triphasic    Celiac artery, origin: 114/24 cm/s, arterial monophasic  Celiac artery, mid: 93/17 cm/s, arterial monophasic  Celiac artery, distal: 68/10 cm/s, arterial monophasic    Hepatic artery: 71/20 cm/s, arterial monophasic  Right hepatic artery: 34/10 cm/s, arterial monophasic  Left hepatic artery: 47/13 cm/s, arterial monophasic    Splenic vein: 10 cm/s, phasic, 12 cm/s, phasic  Superior mesenteric vein: 22 cm/s, phasic    Main portal vein: 29 cm/s, phasic, antegrade  Right portal vein: 20 cm/s, phasic, antegrade  Left portal vein: 20 cm/s, phasic, antegrade    Right hepatic vein: 38 cm/s, antegrade  Middle hepatic vein: 50 cm/s, antegrade  Left hepatic vein: 50 cm/s, antegrade    Inferior vena cava: 2.8 cm, 37 cm/s, phasic    Superior mesenteric artery, origin: 151/22 cm/s, triphasic  Superior mesenteric artery, mid: 162/0 cm/s, triphasic  Superior mesenteric artery, distal: 124/0 cm/s, biphasic    Aorta, below superior mesenteric artery: 107/0 cm/s, triphasic      Impression    IMPRESSION: Inferior mesenteric artery not evaluated. Otherwise  negative study.    I have personally reviewed the examination and initial interpretation  and I agree with the findings.    LINDA  MD PADMINI         SYSTEM ID:  [id number]

## 2024-06-03 NOTE — PROGRESS NOTES
"CRITICAL CARE STAFF ATTESTATION NOTE  Alisha Crowe was seen and evaluated by me on 06/03/24.      I have reviewed changes in critical data from the last 24 hours, including medications, laboratory results, vital signs, radiograph results, and consultant recommendations.   I have  formulated and discussed my care plan with the patient s Advanced Practice Provider/Resident/Fellow/medical student, pharmacist, respiratory therapist, and dietitian.  ICU Interventions: ventilator management, parenteral medications necessitating continuous monitoring including vasoactive medications, medication for heart rhythm control, insulin infusion, and/or sedative/opiates , and interpretation of lab values, cardiac output, cxr, pulse oximetry, blood gases, and/or information/data stored in computers  Clinically Significant Risk Factors              # Hypoalbuminemia: Lowest albumin = 3 g/dL at 6/1/2024  9:52 PM, will monitor as appropriate  # Coagulation Defect: INR = 1.47 (Ref range: 0.85 - 1.15) and/or PTT = 37 Seconds (Ref range: 22 - 38 Seconds), will monitor for bleeding  # Thrombocytopenia: Lowest platelets = 107 in last 2 days, will monitor for bleeding          # Obesity: Estimated body mass index is 31.94 kg/m  as calculated from the following:    Height as of this encounter: 1.473 m (4' 10\").    Weight as of this encounter: 69.3 kg (152 lb 12.8 oz)., PRESENT ON ADMISSION         I agree with the note being attested and the following is my brief summary of the pertinent features of the assessment and plan:    A/P:  Alisha Crowe is a 43 year old female who developed septic shock secondary to ecoli bacteremia in setting of ascending colitis.  1. Ascending colitis  2. Ecoli bacteremia  3. Septic shock  Shock resolved, afebrile, lactate cleared, leukocytosis improving, off vasopressors since 6/2 am. Received flagyl, vancomycin, dc'd 6/2, zosyn 6/2-discontinue today. Will  plan for 10-14 day course, potential for " de-escalation/narrowing of antibiotics. Repeat blood cultures.  4. YOLANDA  In setting of septic shock, improving    Billing Statement:  Total  time spent by me providing and directing care, excluding procedures, was  50 minutes    Kevan Morales MD

## 2024-06-03 NOTE — PHARMACY-ADMISSION MEDICATION HISTORY
"Pharmacist Admission Medication History    Admission medication history is complete. The information provided in this note is only as accurate as the sources available at the time of the update.    Information Source(s): Patient, Clinic records, Hospital records, and [facility]    Pertinent Information: Patient is a reliable historian of her medications prior to admission and was also able to verify her allergies. Of note, the patient may be non-adherent to the following medications, but may be filling refills elsewhere: clondine, mirtazapine, and prazosin (last filled per [facility] fill Hx on 1/23/24 for 30 day supply). Added medications as \"taking\" but has financial difficulties and is unable to afford medications.     Changes made to PTA medication list:  Added:   Bupropion  mg tablet  Clonidine 0.1 mg tablet  Lorazepam 2 mg tablet  Mirtazapine 30 mg tablet  Prazosin 2 mg capsule  Deleted: None  Changed: None    Allergies reviewed with patient and updates made in EHR: yes    Medication History Completed By: Jesus Eli RPH 6/3/2024 10:49 AM    PTA Med List   Medication Sig Last Dose    buPROPion (WELLBUTRIN SR) 150 MG 12 hr tablet Take 1 tablet by mouth daily     cloNIDine (CATAPRES) 0.1 MG tablet Take 0.1 mg by mouth 2 times daily as needed (anxiety)     LORazepam (ATIVAN) 2 MG tablet Take 2 mg by mouth 3 times daily as needed for anxiety     mirtazapine (REMERON) 30 MG tablet Take 60 mg by mouth at bedtime     prazosin (MINIPRESS) 2 MG capsule Take 2 mg by mouth at bedtime        "

## 2024-06-03 NOTE — PROGRESS NOTES
"    [facility]    Progress Note - Colorectal Surgery Service      Assessment & Plan: Surgery   Alisha Crowe is a 43 year old female with hx notable for previous episode of colitis, family hx of mother requiring total abdominal colectomy who presented to the ED critically ill with abdominal pain and septic shock. CT was reviewed showing fat stranding around the ascending colon though not significant enough to explain the extent of her critical illness. No evidence of perforation or other apparent surgical disease. Her blood cultures have returned positive for GNR, suggesting bacterial translocation process. She has been slowly improving with resuscitation and antibiotics. Off pressors, white count coming down.     GI consulted 6/2     - Can start CLD  - Appreciate GI recs  - Continue remainder of care per ICU  - Colorectal surgery will continue to follow       Drains: None     Code Status: Full Code    Clinically Significant Risk Factors              # Hypoalbuminemia: Lowest albumin = 3 g/dL at 6/1/2024  9:52 PM, will monitor as appropriate  # Coagulation Defect: INR = 1.47 (Ref range: 0.85 - 1.15) and/or PTT = 37 Seconds (Ref range: 22 - 38 Seconds), will monitor for bleeding  # Thrombocytopenia: Lowest platelets = 107 in last 2 days, will monitor for bleeding          # Obesity: Estimated body mass index is 31.94 kg/m  as calculated from the following:    Height as of this encounter: 1.473 m (4' 10\").    Weight as of this encounter: 69.3 kg (152 lb 12.8 oz)., PRESENT ON ADMISSION          The patient's care was discussed with Dr. Moraima Samaniego MD  [facility]  Non-urgent messages: Securely message with [employer] (more info)  Text page via [facility] Paging/Directory     ______________________________________________________________________    Interval History  Still having abdominal pain, but improved with pain " medication. No fever overnight, off pressors.       Physical Exam   Vital Signs: Temp: 98  F (36.7  C) Temp src: Oral BP: 94/66 Pulse: 71   Resp: 25 SpO2: 92 % O2 Device: None (Room air)    Weight: 152 lbs 12.8 ozNo intake or output data in the 24 hours ending 06/01/24 1346  General Appearance: Alert, appears uncomfortable, no acute distress  Respiratory: breathing comfortably on room air  GI: well healed laparoscopic surgical scars, soft, soft, nondistended, tender in LLQ. No peritoneal signs.   Skin: warm, Nany    Data   Results for orders placed or performed during the hospital encounter of 06/01/24 (from the past 24 hour(s))   US Abdominal Doppler Complete    Narrative    ULTRASOUND ABDOMEN OR PELVIS ARTERIAL AND VENOUS DOPPLER 6/2/2024 3:40  PM    CLINICAL HISTORY: assess mesenteric arteries/veins r/o mesenteric  ischemia in the setting of pain out of proportion of the abdomen,  septic shock from E.coli bacteremia..     COMPARISONS: CT abdomen and pelvis without contrast 6/1/2024    REFERRING PROVIDER: FREDDIE FRANKLIN    TECHNIQUE: Aorta and mesenteric arteries evaluated with grayscale,  color Doppler, and spectral pulsed wave Doppler ultrasound.    Inferior vena cava, hepatic, portal, superior mesenteric, and splenic  veins evaluated with grayscale, color Doppler, and spectral pulsed  wave Doppler ultrasound.    FINDINGS:   Aorta, proximal: 2.0 cm, 149/0 cm/s, triphasic    Celiac artery, origin: 114/24 cm/s, arterial monophasic  Celiac artery, mid: 93/17 cm/s, arterial monophasic  Celiac artery, distal: 68/10 cm/s, arterial monophasic    Hepatic artery: 71/20 cm/s, arterial monophasic  Right hepatic artery: 34/10 cm/s, arterial monophasic  Left hepatic artery: 47/13 cm/s, arterial monophasic    Splenic vein: 10 cm/s, phasic, 12 cm/s, phasic  Superior mesenteric vein: 22 cm/s, phasic    Main portal vein: 29 cm/s, phasic, antegrade  Right portal vein: 20 cm/s, phasic, antegrade  Left portal vein: 20 cm/s,  phasic, antegrade    Right hepatic vein: 38 cm/s, antegrade  Middle hepatic vein: 50 cm/s, antegrade  Left hepatic vein: 50 cm/s, antegrade    Inferior vena cava: 2.8 cm, 37 cm/s, phasic    Superior mesenteric artery, origin: 151/22 cm/s, triphasic  Superior mesenteric artery, mid: 162/0 cm/s, triphasic  Superior mesenteric artery, distal: 124/0 cm/s, biphasic    Aorta, below superior mesenteric artery: 107/0 cm/s, triphasic      Impression    IMPRESSION: Inferior mesenteric artery not evaluated. Otherwise  negative study.    I have personally reviewed the examination and initial interpretation  and I agree with the findings.    LINDA WASHINGTON MD         SYSTEM ID:  [id number]   Basic metabolic panel   Result Value Ref Range    Sodium 139 135 - 145 mmol/L    Potassium 3.6 3.4 - 5.3 mmol/L    Chloride 110 (H) 98 - 107 mmol/L    Carbon Dioxide (CO2) 18 (L) 22 - 29 mmol/L    Anion Gap 11 7 - 15 mmol/L    Urea Nitrogen 25.8 (H) 6.0 - 20.0 mg/dL    Creatinine 1.87 (H) 0.51 - 0.95 mg/dL    GFR Estimate 34 (L) >60 mL/min/1.73m2    Calcium 8.0 (L) 8.6 - 10.0 mg/dL    Glucose 99 70 - 99 mg/dL   Lactic acid whole blood   Result Value Ref Range    Lactic Acid 1.4 0.7 - 2.0 mmol/L   CBC with Platelets & Differential    Narrative    The following orders were created for panel order CBC with Platelets & Differential.  Procedure                               Abnormality         Status                     ---------                               -----------         ------                     CBC with platelets and d...[[id number]]  Abnormal            Final result                 Please view results for these tests on the individual orders.   Basic metabolic panel   Result Value Ref Range    Sodium 139 135 - 145 mmol/L    Potassium 3.7 3.4 - 5.3 mmol/L    Chloride 110 (H) 98 - 107 mmol/L    Carbon Dioxide (CO2) 19 (L) 22 - 29 mmol/L    Anion Gap 10 7 - 15 mmol/L    Urea Nitrogen 23.5 (H) 6.0 - 20.0 mg/dL    Creatinine 1.74 (H) 0.51 - 0.95  mg/dL    GFR Estimate 37 (L) >60 mL/min/1.73m2    Calcium 8.6 8.6 - 10.0 mg/dL    Glucose 105 (H) 70 - 99 mg/dL   CBC with platelets and differential   Result Value Ref Range    WBC Count 15.5 (H) 4.0 - 11.0 10e3/uL    RBC Count 3.33 (L) 3.80 - 5.20 10e6/uL    Hemoglobin 9.9 (L) 11.7 - 15.7 g/dL    Hematocrit 29.4 (L) 35.0 - 47.0 %    MCV 88 78 - 100 fL    MCH 29.7 26.5 - 33.0 pg    MCHC 33.7 31.5 - 36.5 g/dL    RDW 14.1 10.0 - 15.0 %    Platelet Count 107 (L) 150 - 450 10e3/uL    % Neutrophils 90 %    % Lymphocytes 4 %    % Monocytes 4 %    % Eosinophils 1 %    % Basophils 0 %    % Immature Granulocytes 1 %    NRBCs per 100 WBC 0 <1 /100    Absolute Neutrophils 14.0 (H) 1.6 - 8.3 10e3/uL    Absolute Lymphocytes 0.7 (L) 0.8 - 5.3 10e3/uL    Absolute Monocytes 0.6 0.0 - 1.3 10e3/uL    Absolute Eosinophils 0.1 0.0 - 0.7 10e3/uL    Absolute Basophils 0.0 0.0 - 0.2 10e3/uL    Absolute Immature Granulocytes 0.1 <=0.4 10e3/uL    Absolute NRBCs 0.0 10e3/uL   EKG 12-lead, complete   Result Value Ref Range    Systolic Blood Pressure  mmHg    Diastolic Blood Pressure  mmHg    Ventricular Rate 78 BPM    Atrial Rate 82 BPM    DE Interval 152 ms    QRS Duration 90 ms     ms    QTc 414 ms    P Axis 56 degrees    R AXIS 26 degrees    T Axis 21 degrees    Interpretation ECG       Sinus rhythm with Premature supraventricular complexes  Cannot rule out Anterior infarct , age undetermined  Abnormal ECG  When compared with ECG of 01-JUN-2024 12:09, (unconfirmed)  Premature supraventricular complexes are now Present     Glucose by meter   Result Value Ref Range    GLUCOSE BY METER POCT 91 70 - 99 mg/dL          CT Abdomen Pelvis w/o Contrast    Result Date: 6/1/2024    IMPRESSION: Examination is limited due to lack of IV contrast. Within the limitations of the examination, findings are concerning for colitis with bowel wall thickening and adjacent inflammatory changes involving the cecum and ascending colon. Differential  considerations include infectious, inflammatory or possible ischemic etiology given history of hypotension. I have personally reviewed the examination and initial interpretation and I agree with the findings. RICARDO BAXTER MD   SYSTEM ID:  [id number]
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
## 2024-06-03 NOTE — PROGRESS NOTES
"Colon and Rectal Surgery  Daily Progress Note    Subjective  Patient reports feeling \"okay\" this morning. No acute complaints overnight after pressors were taken off yesterday. Still complaining of pain over lower abdomen that is moderately controlled with PRN Dilaudid and Oxycodone. States she is able to void urine but has not had a bowel movement since before admission. Notes that she has been passing flatus.    Objective  Intake/Output last 24 hrs:    Intake/Output Summary (Last 24 hours) at 6/3/2024 0919  Last data filed at 6/3/2024 0900  Gross per 24 hour   Intake 1200.71 ml   Output 3090 ml   Net -1889.29 ml     Temp:  [98  F (36.7  C)-98.8  F (37.1  C)] 98  F (36.7  C)  Pulse:  [66-97] 92  Resp:  [9-46] 27  BP: ()/(49-74) 108/58  MAP:  [61 mmHg-86 mmHg] 78 mmHg  Arterial Line BP: ()/(47-68) 112/64  SpO2:  [91 %-98 %] 92 %    Physical Exam:  General: awake, alert, appears slightly uncomfortable  Head: normocephalic, atraumatic  Respiratory: non-labored breathing  Abdomen: soft, non-distended. Moderate tenderness to lower abdomen with deep palpation. Well-healed surgical scars. No peritoneal signs.  Skin: No rashes or lesions  Musculoskeletal: moves all four extremities equally  Psychological: alert and oriented, answers questions appropriately    Pertinent Labs  Lab Results: personally reviewed.  Lab Results   Component Value Date     06/03/2024     06/02/2024     06/02/2024    CO2 19 06/03/2024    CO2 18 06/02/2024    CO2 18 06/02/2024    BUN 23.5 06/03/2024    BUN 25.8 06/02/2024    BUN 29.8 06/02/2024     Lab Results   Component Value Date    WBC 15.5 06/03/2024    WBC 22.2 06/02/2024    WBC 29.6 06/01/2024    HGB 9.9 06/03/2024    HGB 10.6 06/02/2024    HGB 10.9 06/01/2024    HGB 11.1 06/01/2024    HCT 29.4 06/03/2024    HCT 30.6 06/02/2024    HCT 32 06/01/2024    HCT 32.7 06/01/2024    MCV 88 06/03/2024    MCV 88 06/02/2024    MCV 90 06/01/2024     06/03/2024    PLT " 116 06/02/2024     06/01/2024       Assessment/Plan: This is a 43 year old female with hx of asthma, MDD, ADHD, PTSD, chronic pelvic pain (previously on chronic oxycodone) admitted on 6/1/2024 for septic shock 2/2 E.Coli bacteremia and colitis. Patient appears to be improving well at this time. Has been weaned off pressors for about 17 hours now. Etiology of colitis is still unclear at this time. Possible ischemic colitis vs. Infectious colitis per GI. Abdominal pain is slowly improving with improvement of vital signs and drop in WBC.     - Recommend CLD as tolerated as abdominal pain improves  - Recommend multimodal pain control  - Per GI, recommend outpatient colonoscopy  - No surgical interventions at this time  - Remainder of care per primary    Discussed with Dr. Valera and colorectal team    Will Cervantes MS4

## 2024-06-03 NOTE — PROGRESS NOTES
Transferred to:  at 1530  Status at time of transfer: VSS, on RA, with chart, in wheelchair  Belongings: Phone, phone , pt belonging bags  Eli removed? (if no, why?): N/A  Chart and medications: with patient  Family notified: No emergency phone numbers in chart

## 2024-06-04 ENCOUNTER — APPOINTMENT (OUTPATIENT)
Dept: CT IMAGING | Facility: CLINIC | Age: 44
DRG: 871 | End: 2024-06-04

## 2024-06-04 LAB
ADV 40+41 DNA STL QL NAA+NON-PROBE: NEGATIVE
ALBUMIN SERPL BCG-MCNC: 2.3 G/DL (ref 3.5–5.2)
ALBUMIN SERPL BCG-MCNC: 3 G/DL (ref 3.5–5.2)
ALP SERPL-CCNC: 101 U/L (ref 40–150)
ALP SERPL-CCNC: 87 U/L (ref 40–150)
ALT SERPL W P-5'-P-CCNC: 25 U/L (ref 0–50)
ALT SERPL W P-5'-P-CCNC: 36 U/L (ref 0–50)
ANION GAP SERPL CALCULATED.3IONS-SCNC: 11 MMOL/L (ref 7–15)
ANION GAP SERPL CALCULATED.3IONS-SCNC: 11 MMOL/L (ref 7–15)
AST SERPL W P-5'-P-CCNC: 24 U/L (ref 0–45)
AST SERPL W P-5'-P-CCNC: 38 U/L (ref 0–45)
ASTRO TYP 1-8 RNA STL QL NAA+NON-PROBE: NEGATIVE
BACTERIA BLD CULT: ABNORMAL
BACTERIA UR CULT: ABNORMAL
BACTERIA UR CULT: ABNORMAL
BACTERIAL VAGINOSIS VAG-IMP: POSITIVE
BASOPHILS # BLD AUTO: 0 10E3/UL (ref 0–0.2)
BASOPHILS NFR BLD AUTO: 0 %
BILIRUB DIRECT SERPL-MCNC: 0.34 MG/DL (ref 0–0.3)
BILIRUB DIRECT SERPL-MCNC: 0.35 MG/DL (ref 0–0.3)
BILIRUB SERPL-MCNC: 0.6 MG/DL
BILIRUB SERPL-MCNC: 0.7 MG/DL
BUN SERPL-MCNC: 12.9 MG/DL (ref 6–20)
BUN SERPL-MCNC: 17 MG/DL (ref 6–20)
C CAYETANENSIS DNA STL QL NAA+NON-PROBE: NEGATIVE
C DIFF TOX B STL QL: NEGATIVE
CALCIUM SERPL-MCNC: 8.1 MG/DL (ref 8.6–10)
CALCIUM SERPL-MCNC: 8.7 MG/DL (ref 8.6–10)
CAMPYLOBACTER DNA SPEC NAA+PROBE: NEGATIVE
CANDIDA DNA VAG QL NAA+PROBE: DETECTED
CANDIDA GLABRATA / CANDIDA KRUSEI DNA: DETECTED
CHLORIDE SERPL-SCNC: 107 MMOL/L (ref 98–107)
CHLORIDE SERPL-SCNC: 107 MMOL/L (ref 98–107)
CREAT SERPL-MCNC: 1.28 MG/DL (ref 0.51–0.95)
CREAT SERPL-MCNC: 1.44 MG/DL (ref 0.51–0.95)
CRYPTOSP DNA STL QL NAA+NON-PROBE: NEGATIVE
DEPRECATED HCO3 PLAS-SCNC: 19 MMOL/L (ref 22–29)
DEPRECATED HCO3 PLAS-SCNC: 19 MMOL/L (ref 22–29)
E COLI O157 DNA STL QL NAA+NON-PROBE: NORMAL
E HISTOLYT DNA STL QL NAA+NON-PROBE: NEGATIVE
EAEC ASTA GENE ISLT QL NAA+PROBE: NEGATIVE
EC STX1+STX2 GENES STL QL NAA+NON-PROBE: NEGATIVE
EGFRCR SERPLBLD CKD-EPI 2021: 46 ML/MIN/1.73M2
EGFRCR SERPLBLD CKD-EPI 2021: 53 ML/MIN/1.73M2
EOSINOPHIL # BLD AUTO: 0.3 10E3/UL (ref 0–0.7)
EOSINOPHIL NFR BLD AUTO: 3 %
EPEC EAE GENE STL QL NAA+NON-PROBE: NEGATIVE
ERYTHROCYTE [DISTWIDTH] IN BLOOD BY AUTOMATED COUNT: 14.4 % (ref 10–15)
ETEC LTA+ST1A+ST1B TOX ST NAA+NON-PROBE: NEGATIVE
G LAMBLIA DNA STL QL NAA+NON-PROBE: NEGATIVE
GLUCOSE BLDC GLUCOMTR-MCNC: 107 MG/DL (ref 70–99)
GLUCOSE SERPL-MCNC: 92 MG/DL (ref 70–99)
GLUCOSE SERPL-MCNC: 92 MG/DL (ref 70–99)
HBV CORE AB SERPL QL IA: NONREACTIVE
HBV SURFACE AB SERPL IA-ACNC: <3.5 M[IU]/ML
HBV SURFACE AB SERPL IA-ACNC: NONREACTIVE M[IU]/ML
HBV SURFACE AG SERPL QL IA: NONREACTIVE
HCT VFR BLD AUTO: 30.9 % (ref 35–47)
HCV AB SERPL QL IA: NONREACTIVE
HGB BLD-MCNC: 10.1 G/DL (ref 11.7–15.7)
HIV 1+2 AB+HIV1 P24 AG SERPL QL IA: NONREACTIVE
HOLD SPECIMEN: NORMAL
IMM GRANULOCYTES # BLD: 0.1 10E3/UL
IMM GRANULOCYTES NFR BLD: 1 %
LACTATE SERPL-SCNC: 1.4 MMOL/L (ref 0.7–2)
LYMPHOCYTES # BLD AUTO: 1 10E3/UL (ref 0.8–5.3)
LYMPHOCYTES NFR BLD AUTO: 9 %
MCH RBC QN AUTO: 29.5 PG (ref 26.5–33)
MCHC RBC AUTO-ENTMCNC: 32.7 G/DL (ref 31.5–36.5)
MCV RBC AUTO: 90 FL (ref 78–100)
MONOCYTES # BLD AUTO: 1.1 10E3/UL (ref 0–1.3)
MONOCYTES NFR BLD AUTO: 10 %
NEUTROPHILS # BLD AUTO: 8.3 10E3/UL (ref 1.6–8.3)
NEUTROPHILS NFR BLD AUTO: 77 %
NOROVIRUS GI+II RNA STL QL NAA+NON-PROBE: NEGATIVE
NRBC # BLD AUTO: 0 10E3/UL
NRBC BLD AUTO-RTO: 0 /100
P SHIGELLOIDES DNA STL QL NAA+NON-PROBE: NEGATIVE
PLATELET # BLD AUTO: 131 10E3/UL (ref 150–450)
POTASSIUM SERPL-SCNC: 3.7 MMOL/L (ref 3.4–5.3)
POTASSIUM SERPL-SCNC: 3.9 MMOL/L (ref 3.4–5.3)
PROT SERPL-MCNC: 4.5 G/DL (ref 6.4–8.3)
PROT SERPL-MCNC: 5.5 G/DL (ref 6.4–8.3)
RBC # BLD AUTO: 3.42 10E6/UL (ref 3.8–5.2)
RVA RNA STL QL NAA+NON-PROBE: NEGATIVE
SALMONELLA SP RPOD STL QL NAA+PROBE: NEGATIVE
SAPO I+II+IV+V RNA STL QL NAA+NON-PROBE: NEGATIVE
SHIGELLA SP+EIEC IPAH ST NAA+NON-PROBE: NEGATIVE
SODIUM SERPL-SCNC: 137 MMOL/L (ref 135–145)
SODIUM SERPL-SCNC: 137 MMOL/L (ref 135–145)
T VAGINALIS DNA VAG QL NAA+PROBE: NOT DETECTED
V CHOLERAE DNA SPEC QL NAA+PROBE: NEGATIVE
VIBRIO DNA SPEC NAA+PROBE: NEGATIVE
WBC # BLD AUTO: 10.8 10E3/UL (ref 4–11)
Y ENTEROCOL DNA STL QL NAA+PROBE: NEGATIVE

## 2024-06-04 PROCEDURE — 80048 BASIC METABOLIC PNL TOTAL CA: CPT

## 2024-06-04 PROCEDURE — 36415 COLL VENOUS BLD VENIPUNCTURE: CPT

## 2024-06-04 PROCEDURE — 74177 CT ABD & PELVIS W/CONTRAST: CPT | Mod: 26 | Performed by: STUDENT IN AN ORGANIZED HEALTH CARE EDUCATION/TRAINING PROGRAM

## 2024-06-04 PROCEDURE — 85025 COMPLETE CBC W/AUTO DIFF WBC: CPT | Performed by: HOSPITALIST

## 2024-06-04 PROCEDURE — 87493 C DIFF AMPLIFIED PROBE: CPT | Performed by: STUDENT IN AN ORGANIZED HEALTH CARE EDUCATION/TRAINING PROGRAM

## 2024-06-04 PROCEDURE — 250N000013 HC RX MED GY IP 250 OP 250 PS 637

## 2024-06-04 PROCEDURE — 258N000003 HC RX IP 258 OP 636: Performed by: PHYSICIAN ASSISTANT

## 2024-06-04 PROCEDURE — 83690 ASSAY OF LIPASE: CPT

## 2024-06-04 PROCEDURE — 250N000011 HC RX IP 250 OP 636

## 2024-06-04 PROCEDURE — 250N000011 HC RX IP 250 OP 636: Performed by: STUDENT IN AN ORGANIZED HEALTH CARE EDUCATION/TRAINING PROGRAM

## 2024-06-04 PROCEDURE — 87389 HIV-1 AG W/HIV-1&-2 AB AG IA: CPT

## 2024-06-04 PROCEDURE — 36415 COLL VENOUS BLD VENIPUNCTURE: CPT | Performed by: HOSPITALIST

## 2024-06-04 PROCEDURE — 83605 ASSAY OF LACTIC ACID: CPT | Performed by: HOSPITALIST

## 2024-06-04 PROCEDURE — 258N000003 HC RX IP 258 OP 636

## 2024-06-04 PROCEDURE — 250N000013 HC RX MED GY IP 250 OP 250 PS 637: Performed by: PHYSICIAN ASSISTANT

## 2024-06-04 PROCEDURE — 120N000002 HC R&B MED SURG/OB UMMC

## 2024-06-04 PROCEDURE — 999N000128 HC STATISTIC PERIPHERAL IV START W/O US GUIDANCE

## 2024-06-04 PROCEDURE — 99233 SBSQ HOSP IP/OBS HIGH 50: CPT | Mod: FS | Performed by: STUDENT IN AN ORGANIZED HEALTH CARE EDUCATION/TRAINING PROGRAM

## 2024-06-04 PROCEDURE — 82040 ASSAY OF SERUM ALBUMIN: CPT

## 2024-06-04 PROCEDURE — 99207 PR APP CREDIT; MD BILLING SHARED VISIT: CPT | Mod: FS

## 2024-06-04 PROCEDURE — 74177 CT ABD & PELVIS W/CONTRAST: CPT

## 2024-06-04 PROCEDURE — 87491 CHLMYD TRACH DNA AMP PROBE: CPT

## 2024-06-04 PROCEDURE — 86803 HEPATITIS C AB TEST: CPT

## 2024-06-04 PROCEDURE — 87340 HEPATITIS B SURFACE AG IA: CPT

## 2024-06-04 PROCEDURE — 80048 BASIC METABOLIC PNL TOTAL CA: CPT | Performed by: HOSPITALIST

## 2024-06-04 PROCEDURE — 258N000003 HC RX IP 258 OP 636: Performed by: HOSPITALIST

## 2024-06-04 PROCEDURE — 84450 TRANSFERASE (AST) (SGOT): CPT

## 2024-06-04 PROCEDURE — 250N000013 HC RX MED GY IP 250 OP 250 PS 637: Performed by: STUDENT IN AN ORGANIZED HEALTH CARE EDUCATION/TRAINING PROGRAM

## 2024-06-04 PROCEDURE — 250N000011 HC RX IP 250 OP 636: Performed by: INTERNAL MEDICINE

## 2024-06-04 PROCEDURE — 0352U MULTIPLEX VAGINAL PANEL BY PCR: CPT | Performed by: INTERNAL MEDICINE

## 2024-06-04 PROCEDURE — 87507 IADNA-DNA/RNA PROBE TQ 12-25: CPT | Performed by: STUDENT IN AN ORGANIZED HEALTH CARE EDUCATION/TRAINING PROGRAM

## 2024-06-04 RX ORDER — DICYCLOMINE HYDROCHLORIDE 10 MG/1
10 CAPSULE ORAL 4 TIMES DAILY
Status: DISCONTINUED | OUTPATIENT
Start: 2024-06-04 | End: 2024-06-08 | Stop reason: HOSPADM

## 2024-06-04 RX ORDER — SIMETHICONE 80 MG
80 TABLET,CHEWABLE ORAL EVERY 6 HOURS PRN
Status: DISCONTINUED | OUTPATIENT
Start: 2024-06-04 | End: 2024-06-08 | Stop reason: HOSPADM

## 2024-06-04 RX ORDER — IOPAMIDOL 755 MG/ML
95 INJECTION, SOLUTION INTRAVASCULAR ONCE
Status: COMPLETED | OUTPATIENT
Start: 2024-06-04 | End: 2024-06-04

## 2024-06-04 RX ORDER — SODIUM CHLORIDE, SODIUM LACTATE, POTASSIUM CHLORIDE, CALCIUM CHLORIDE 600; 310; 30; 20 MG/100ML; MG/100ML; MG/100ML; MG/100ML
INJECTION, SOLUTION INTRAVENOUS CONTINUOUS
Status: DISCONTINUED | OUTPATIENT
Start: 2024-06-04 | End: 2024-06-05

## 2024-06-04 RX ORDER — FLUCONAZOLE 150 MG/1
150 TABLET ORAL ONCE
Qty: 1 TABLET | Refills: 0 | Status: COMPLETED | OUTPATIENT
Start: 2024-06-04 | End: 2024-06-04

## 2024-06-04 RX ORDER — OXYCODONE HYDROCHLORIDE 5 MG/1
5 TABLET ORAL
Status: COMPLETED | OUTPATIENT
Start: 2024-06-04 | End: 2024-06-04

## 2024-06-04 RX ADMIN — PIPERACILLIN AND TAZOBACTAM 4.5 G: 4; .5 INJECTION, POWDER, FOR SOLUTION INTRAVENOUS at 06:52

## 2024-06-04 RX ADMIN — HYDROMORPHONE HYDROCHLORIDE 0.4 MG: 0.2 INJECTION, SOLUTION INTRAMUSCULAR; INTRAVENOUS; SUBCUTANEOUS at 23:42

## 2024-06-04 RX ADMIN — DICYCLOMINE HYDROCHLORIDE 10 MG: 10 CAPSULE ORAL at 20:53

## 2024-06-04 RX ADMIN — SIMETHICONE 80 MG: 80 TABLET, CHEWABLE ORAL at 14:23

## 2024-06-04 RX ADMIN — HYDROMORPHONE HYDROCHLORIDE 0.4 MG: 0.2 INJECTION, SOLUTION INTRAMUSCULAR; INTRAVENOUS; SUBCUTANEOUS at 15:57

## 2024-06-04 RX ADMIN — OXYCODONE HYDROCHLORIDE 5 MG: 5 TABLET ORAL at 04:18

## 2024-06-04 RX ADMIN — DICYCLOMINE HYDROCHLORIDE 10 MG: 10 CAPSULE ORAL at 12:37

## 2024-06-04 RX ADMIN — HEPARIN SODIUM 5000 UNITS: 5000 INJECTION, SOLUTION INTRAVENOUS; SUBCUTANEOUS at 23:35

## 2024-06-04 RX ADMIN — OXYCODONE HYDROCHLORIDE 10 MG: 5 TABLET ORAL at 17:35

## 2024-06-04 RX ADMIN — OXYCODONE HYDROCHLORIDE 10 MG: 5 TABLET ORAL at 21:47

## 2024-06-04 RX ADMIN — HYDROMORPHONE HYDROCHLORIDE 0.2 MG: 0.2 INJECTION, SOLUTION INTRAMUSCULAR; INTRAVENOUS; SUBCUTANEOUS at 09:40

## 2024-06-04 RX ADMIN — PIPERACILLIN AND TAZOBACTAM 4.5 G: 4; .5 INJECTION, POWDER, FOR SOLUTION INTRAVENOUS at 00:31

## 2024-06-04 RX ADMIN — OXYCODONE HYDROCHLORIDE 5 MG: 5 TABLET ORAL at 18:26

## 2024-06-04 RX ADMIN — IOPAMIDOL 95 ML: 755 INJECTION, SOLUTION INTRAVENOUS at 15:43

## 2024-06-04 RX ADMIN — SODIUM CHLORIDE, POTASSIUM CHLORIDE, SODIUM LACTATE AND CALCIUM CHLORIDE: 600; 310; 30; 20 INJECTION, SOLUTION INTRAVENOUS at 17:36

## 2024-06-04 RX ADMIN — SODIUM CHLORIDE, POTASSIUM CHLORIDE, SODIUM LACTATE AND CALCIUM CHLORIDE: 600; 310; 30; 20 INJECTION, SOLUTION INTRAVENOUS at 12:50

## 2024-06-04 RX ADMIN — SODIUM CHLORIDE, POTASSIUM CHLORIDE, SODIUM LACTATE AND CALCIUM CHLORIDE 500 ML: 600; 310; 30; 20 INJECTION, SOLUTION INTRAVENOUS at 02:56

## 2024-06-04 RX ADMIN — PIPERACILLIN AND TAZOBACTAM 4.5 G: 4; .5 INJECTION, POWDER, FOR SOLUTION INTRAVENOUS at 12:38

## 2024-06-04 RX ADMIN — HYDROMORPHONE HYDROCHLORIDE 0.2 MG: 0.2 INJECTION, SOLUTION INTRAMUSCULAR; INTRAVENOUS; SUBCUTANEOUS at 01:17

## 2024-06-04 RX ADMIN — HEPARIN SODIUM 5000 UNITS: 5000 INJECTION, SOLUTION INTRAVENOUS; SUBCUTANEOUS at 08:50

## 2024-06-04 RX ADMIN — PIPERACILLIN AND TAZOBACTAM 4.5 G: 4; .5 INJECTION, POWDER, FOR SOLUTION INTRAVENOUS at 23:35

## 2024-06-04 RX ADMIN — SIMETHICONE 80 MG: 80 TABLET, CHEWABLE ORAL at 20:53

## 2024-06-04 RX ADMIN — HEPARIN SODIUM 5000 UNITS: 5000 INJECTION, SOLUTION INTRAVENOUS; SUBCUTANEOUS at 16:07

## 2024-06-04 RX ADMIN — OXYCODONE HYDROCHLORIDE 10 MG: 5 TABLET ORAL at 08:47

## 2024-06-04 RX ADMIN — HEPARIN SODIUM 5000 UNITS: 5000 INJECTION, SOLUTION INTRAVENOUS; SUBCUTANEOUS at 00:31

## 2024-06-04 RX ADMIN — OXYCODONE HYDROCHLORIDE 10 MG: 5 TABLET ORAL at 12:47

## 2024-06-04 RX ADMIN — SODIUM CHLORIDE, POTASSIUM CHLORIDE, SODIUM LACTATE AND CALCIUM CHLORIDE 500 ML: 600; 310; 30; 20 INJECTION, SOLUTION INTRAVENOUS at 22:21

## 2024-06-04 RX ADMIN — FLUCONAZOLE 150 MG: 150 TABLET ORAL at 09:46

## 2024-06-04 RX ADMIN — DICYCLOMINE HYDROCHLORIDE 10 MG: 10 CAPSULE ORAL at 16:06

## 2024-06-04 RX ADMIN — PIPERACILLIN AND TAZOBACTAM 4.5 G: 4; .5 INJECTION, POWDER, FOR SOLUTION INTRAVENOUS at 17:36

## 2024-06-04 ASSESSMENT — ACTIVITIES OF DAILY LIVING (ADL)
ADLS_ACUITY_SCORE: 25
DEPENDENT_IADLS:: INDEPENDENT
ADLS_ACUITY_SCORE: 25
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
## 2024-06-04 NOTE — PLAN OF CARE
Problem: Adult Inpatient Plan of Care  Goal: Plan of Care Review  Description: The Plan of Care Review/Shift note should be completed every shift.  The Outcome Evaluation is a brief statement about your assessment that the patient is improving, declining, or no change.  This information will be displayed automatically on your shift  note.  Flowsheets (Taken 6/4/2024 8237)  Outcome Evaluation:   ongoing POC   CMA completed due to lack of payer source   pt makes needs known   Pending BCx and Abx plan   RNCC to follow for safe d/c planning  Plan of Care Reviewed With: patient  Overall Patient Progress: improving

## 2024-06-04 NOTE — PLAN OF CARE
Goal Outcome Evaluation:      Plan of Care Reviewed With: patient    Overall Patient Progress: no changeOverall Patient Progress: no change    Outcome Evaluation: Pt's wet prep order was collected and sent to lab. Pt endorsed abdominal pain that radiates to the back and was given diluadid and oxy but reported no relief. Has 3 BM, reported dizzines and MD was notified. One time LR bolus -500 ml was given. Pt is alert and oriented x4, VSS on RA except BP., Pt calls appropriately, continue with POC.

## 2024-06-04 NOTE — PROGRESS NOTES
"[facility]    Progress Note - Colorectal Surgery Service    Assessment & Plan: Surgery   Alisha Crowe is a 43 year old female with hx notable for previous episode of colitis, family hx of mother requiring total abdominal colectomy who presented to the ED critically ill with abdominal pain and septic shock. CT was reviewed showing fat stranding around the ascending colon though not significant enough to explain the extent of her critical illness. No evidence of perforation or other apparent surgical disease. Her blood cultures have returned positive for GNR, suggesting bacterial translocation process. She has been slowly improving with resuscitation and antibiotics. Off pressors, white count coming down. Now floor status and now improving with conservative management.     - Recommend enteric panel with C. Diff if not already done  - Appreciate Medicine and GI cares  - No acute surgical intervention indicated  - Colorectal will sign off at this time. Please reach out with any new questions or concerns.       Drains: None     Code Status: Full Code    Clinically Significant Risk Factors              # Hypoalbuminemia: Lowest albumin = 3 g/dL at 6/1/2024  9:52 PM, will monitor as appropriate   # Thrombocytopenia: Lowest platelets = 107 in last 2 days, will monitor for bleeding          # Obesity: Estimated body mass index is 32.46 kg/m  as calculated from the following:    Height as of this encounter: 1.473 m (4' 10\").    Weight as of this encounter: 70.4 kg (155 lb 4.8 oz)., PRESENT ON ADMISSION          The patient's care was discussed with Dr. Moraima Holly PA-C  [facility]  Non-urgent messages: Securely message with [employer] (more info)  Text page via [employer] Paging/Directory     ______________________________________________________________________    Interval History  Abdominal pain comes and goes in waves. Goes " from side to side. Had 2 bowel movements yesterday that were loose. Stool looked dark, does not think there was blood.       Physical Exam   Vital Signs: Temp: 99.4  F (37.4  C) Temp src: Oral BP: 94/68 Pulse: 65   Resp: 18 SpO2: 97 % O2 Device: None (Room air)    Weight: 155 lbs 4.8 oz    Intake/Output Summary (Last 24 hours) at 6/4/2024 0845  Last data filed at 6/4/2024 0430  Gross per 24 hour   Intake 765 ml   Output 800 ml   Net -35 ml     General Appearance: Alert, appears uncomfortable, no acute distress  Respiratory: breathing comfortably on room air  GI: well healed laparoscopic surgical scars, soft, nondistended, Mildly tender but no guarding, no rebound.   Skin: warm, Nany    Data   Results for orders placed or performed during the hospital encounter of 06/01/24 (from the past 24 hour(s))   Glucose by meter   Result Value Ref Range    GLUCOSE BY METER POCT 86 70 - 99 mg/dL   Blood Culture Arm, Left    Specimen: Arm, Left; Blood   Result Value Ref Range    Culture No growth after 12 hours    Blood Culture Hand, Left    Specimen: Hand, Left; Blood   Result Value Ref Range    Culture No growth after 12 hours    Lactic acid whole blood   Result Value Ref Range    Lactic Acid 1.4 0.7 - 2.0 mmol/L   Basic metabolic panel   Result Value Ref Range    Sodium 137 135 - 145 mmol/L    Potassium 3.7 3.4 - 5.3 mmol/L    Chloride 107 98 - 107 mmol/L    Carbon Dioxide (CO2) 19 (L) 22 - 29 mmol/L    Anion Gap 11 7 - 15 mmol/L    Urea Nitrogen 17.0 6.0 - 20.0 mg/dL    Creatinine 1.44 (H) 0.51 - 0.95 mg/dL    GFR Estimate 46 (L) >60 mL/min/1.73m2    Calcium 8.7 8.6 - 10.0 mg/dL    Glucose 92 70 - 99 mg/dL   CBC with Platelets & Differential    Narrative    The following orders were created for panel order CBC with Platelets & Differential.  Procedure                               Abnormality         Status                     ---------                               -----------         ------                     CBC with  platelets and d...[[id number]]  Abnormal            Final result                 Please view results for these tests on the individual orders.   CBC with platelets and differential   Result Value Ref Range    WBC Count 10.8 4.0 - 11.0 10e3/uL    RBC Count 3.42 (L) 3.80 - 5.20 10e6/uL    Hemoglobin 10.1 (L) 11.7 - 15.7 g/dL    Hematocrit 30.9 (L) 35.0 - 47.0 %    MCV 90 78 - 100 fL    MCH 29.5 26.5 - 33.0 pg    MCHC 32.7 31.5 - 36.5 g/dL    RDW 14.4 10.0 - 15.0 %    Platelet Count 131 (L) 150 - 450 10e3/uL    % Neutrophils 77 %    % Lymphocytes 9 %    % Monocytes 10 %    % Eosinophils 3 %    % Basophils 0 %    % Immature Granulocytes 1 %    NRBCs per 100 WBC 0 <1 /100    Absolute Neutrophils 8.3 1.6 - 8.3 10e3/uL    Absolute Lymphocytes 1.0 0.8 - 5.3 10e3/uL    Absolute Monocytes 1.1 0.0 - 1.3 10e3/uL    Absolute Eosinophils 0.3 0.0 - 0.7 10e3/uL    Absolute Basophils 0.0 0.0 - 0.2 10e3/uL    Absolute Immature Granulocytes 0.1 <=0.4 10e3/uL    Absolute NRBCs 0.0 10e3/uL   Multiplex Vaginal Panel by PCR    Specimen: Vagina; Swab   Result Value Ref Range    Bacterial Vaginosis Organism DNA Positive (A) Negative    Candida Group DNA Detected (A) Not Detected    Candida glabrata / Anna krusei DNA Detected (A) Not Detected    Trichomonas vaginalis DNA Not Detected Not Detected    Narrative    The Xpert  Xpress MVP test, performed on the [employer], is an automated, qualitative in vitro diagnostic test for the detection of DNA targets from anaerobic bacteria associated with bacterial vaginosis, Candida species associated with vulvovaginal candidiasis, and Trichomonas vaginalis. The assay uses clinician-collected and self-collected vaginal swabs from patients who are symptomatic for vaginitis/ vaginosis. The Xpert  Xpress MVP test utilizes real-time polymerase chain reaction (PCR) for the amplification of specific DNA targets and utilizes fluorogenic target-specific hybridization probes to detect and  differentiate DNA. It is intended to aid in the diagnosis of vaginal infections in women with a clinical presentation consistent with bacterial vaginosis, vulvovaginal candidiasis, or trichomoniasis.   The assay targets three anaerobic microorgansims that are associated with bacterial vaginosis (BV). Other organisms that are not detected by the Xpert  Xpress MVP test have also been reported to be associated with BV. The BV organism and Candida species targets of the Xpert  Xpress MVP test can be commensal in women; positive results must be considered in conjunction with other clinical and patient information to determine the disease status.

## 2024-06-04 NOTE — CONSULTS
Care Management Initial Consult    General Information  Assessment completed with: Patient,    Type of CM/SW Visit: Initial Assessment  Primary Care Provider verified and updated as needed: Yes   Readmission within the last 30 days: no previous admission in last 30 days   Reason for Consult: insurance concerns  Advance Care Planning, Reviewed: no concerns identified        Communication Assessment  Patient's communication style: spoken language (English or Bilingual)    Hearing Difficulty or Deaf: no   Wear Glasses or Blind: no    Cognitive  Cognitive/Neuro/Behavioral: WDL    Level of Consciousness: alert    Arousal Level: arouses to voice    Orientation: oriented x 4    Mood/Behavior: flat affect, withdrawn    Best Language: 0 - No aphasia    Speech: clear, spontaneous, logical    Living Environment:   People in home: spouse     Current living Arrangements: house      Able to return to prior arrangements: yes     Family/Social Support:  Care provided by: self  Provides care for: no one  Marital Status: Lives with Significant Other  Support System: Significant Other          Description of Support System: Supportive, Involved       Current Resources:   Patient receiving home care services: No  Community Resources: None  Equipment currently used at home: none  Supplies currently used at home:      Employment/Financial:  Employment Status: employed full-time, employed part-time     Financial Concerns: insurance, none   Referral to Financial Worker: Yes  Does the patient's insurance plan have a 3 day qualifying hospital stay waiver?  No    Lifestyle & Psychosocial Needs:  Social Determinants of Health     Food Insecurity: Not on file   Depression: At risk (5/17/2024)    Received from [facility]    PHQ-2     PHQ-2 Score: 3   Housing Stability: Not on file   Tobacco Use: High Risk (6/1/2024)    Received from [facility]    Patient History     Smoking Tobacco Use: Every Day     Smokeless Tobacco Use: Never      "Passive Exposure: Not on file   Financial Resource Strain: Not on file   Alcohol Use: Not on file   Transportation Needs: Not on file   Physical Activity: Not on file   Interpersonal Safety: Not on file   Stress: Not on file   Social Connections: Not on file   Health Literacy: Not on file     Functional Status:  Prior to admission patient needed assistance:   Dependent ADLs:: Independent  Dependent IADLs:: Independent     Mental Health Status:  Mental Health Status: No Current Concerns       Chemical Dependency Status:  Chemical Dependency Status: No Current Concerns           Values/Beliefs:  Spiritual, Cultural Beliefs, Catholic Practices, Values that affect care: no             Additional Information:  CMA completed at bedside, as indicated by lack of payer source; immediate referral to Financial Counseling**, after pt consents to being contacted by  dept. Pt confirmed listed address; prior external PCP has retired and pt has not re-established a new provider yet; No payer source, prior state plan no longer active. Pt denies prior home care services, DME use, or OP appointments. Pt reports to have been \"making too much\" to maintain prior coverage, and new plan premiums were not affordable. Pt anticipates family or friends available for discharge transportation. RNCC to continue to follow and support safe discharge planning.     ** Yolanda Jay) is working this case. Per the county the patient was determined over income for [place] and given a QHP eligibility determination; however, she failed to contact [facility] to sign up for an individual plan. Yolanda is trying to relay this information to the patient/screen the patient to see if his QHP determination is in error.       Guanako Cristina RN BSN  7C RNCC  Phone: [phone]  Pager: [phone]    For Weekend & Holiday on call RN Care Coordinator:  (Tasks: Home care, home infusion, medical equipment, transportation, IMM & MARROQUIN forms, etc.)  [place] " (0800 - 1630) Saturday and Sunday    Units: 5A, 5B, & 5C: [phone]    Units: 6B, 6C, 6D: [phone]    Units: 7A, 7B, 7C, 7D: [phone]    Units: 6A/ICU : [phone]    [facility] ([id number]) Saturday and Sunday    Units: 6 Med/Surg, 8A, 10 ICU, & Pediatric Units: [phone]    Units: 5 Ortho, 5Med/Surg & WB ED: [phone]

## 2024-06-04 NOTE — PROGRESS NOTES
Internal Medicine Cross Cover Note    Was notified per nursing staff that patient is having vaginal itching and irritation. She requested a wet prep.   - Wet prep ordered     SCOTTY Rivera [facility]  Securely message with the [employer] (learn more here)  Text page via [facility] Paging/Directory

## 2024-06-04 NOTE — PLAN OF CARE
"/68 (BP Location: Left arm)   Pulse 54   Temp 98.6  F (37  C) (Oral)   Resp 16   Ht 1.473 m (4' 10\")   Wt 70.4 kg (155 lb 4.8 oz)   SpO2 96%   BMI 32.46 kg/m      Assumed cares [id number]  Neuro: A/Ox4  Pain/Nausea: abdominal pain, given PRN Oxycodone x2 and PRN IV Dilaudid x1; reporting some gas pain; denies nausea  Cardiac: rate and rhythm regular  Resp: lung sound clear, equal bilaterally on room air  GI/: voiding spontaneously, 1 liquid oskar-like BM this shift w/ lots of straining and gas; simethicone given  Diet/Appetite: clear liquid diet; poor appetite d/t pain  Access: PIV - TKO + abx  Activity: Ax1, bed alarm on  Procedures: abdominal CT ordered  Plan: Pt needs urine Chlamydia sent  Will continue with plan of care and notify team of any changes.?    LISA CISNEROS RN on 6/4/2024 at 2:36 PM        "

## 2024-06-04 NOTE — PLAN OF CARE
Goal Outcome Evaluation: Ongoing, progressing    Plan of Care Reviewed With: patient    Overall Patient Progress: no change    Outcome Evaluation: Pt placed on enteric precautions for enteric virus panel. At~2200 pt reported vaginal soreness and itching and requested writer tammy Johnson. M.D.'s notified and a new order for wet prep was placed, collected, and sent to lab. Pt's mother called and requested to notify doctors to update tomorrow, posted note to Elizabeth. No other changes this shift. Pt is alert and oriented and able to make needs known. Call light within reach, continue to monitor.

## 2024-06-05 LAB
ANION GAP SERPL CALCULATED.3IONS-SCNC: 11 MMOL/L (ref 7–15)
APTT PPP: 28 SECONDS (ref 22–38)
BASOPHILS # BLD AUTO: 0 10E3/UL (ref 0–0.2)
BASOPHILS NFR BLD AUTO: 0 %
BUN SERPL-MCNC: 11.2 MG/DL (ref 6–20)
C TRACH DNA SPEC QL PROBE+SIG AMP: NEGATIVE
CALCIUM SERPL-MCNC: 8.5 MG/DL (ref 8.6–10)
CHLORIDE SERPL-SCNC: 107 MMOL/L (ref 98–107)
CREAT SERPL-MCNC: 1.28 MG/DL (ref 0.51–0.95)
CRP SERPL-MCNC: 69 MG/L
DEPRECATED HCO3 PLAS-SCNC: 20 MMOL/L (ref 22–29)
EGFRCR SERPLBLD CKD-EPI 2021: 53 ML/MIN/1.73M2
EOSINOPHIL # BLD AUTO: 0.2 10E3/UL (ref 0–0.7)
EOSINOPHIL NFR BLD AUTO: 2 %
ERYTHROCYTE [DISTWIDTH] IN BLOOD BY AUTOMATED COUNT: 14.6 % (ref 10–15)
GLUCOSE SERPL-MCNC: 81 MG/DL (ref 70–99)
HCT VFR BLD AUTO: 29 % (ref 35–47)
HGB BLD-MCNC: 9.5 G/DL (ref 11.7–15.7)
IMM GRANULOCYTES # BLD: 0.2 10E3/UL
IMM GRANULOCYTES NFR BLD: 2 %
INR PPP: 1.07 (ref 0.85–1.15)
LACTATE SERPL-SCNC: 0.9 MMOL/L (ref 0.7–2)
LDH SERPL L TO P-CCNC: 129 U/L (ref 0–250)
LIPASE SERPL-CCNC: 32 U/L (ref 13–60)
LYMPHOCYTES # BLD AUTO: 1.1 10E3/UL (ref 0.8–5.3)
LYMPHOCYTES NFR BLD AUTO: 11 %
MCH RBC QN AUTO: 29.9 PG (ref 26.5–33)
MCHC RBC AUTO-ENTMCNC: 32.8 G/DL (ref 31.5–36.5)
MCV RBC AUTO: 91 FL (ref 78–100)
MONOCYTES # BLD AUTO: 1.7 10E3/UL (ref 0–1.3)
MONOCYTES NFR BLD AUTO: 18 %
N GONORRHOEA DNA SPEC QL NAA+PROBE: NEGATIVE
NEUTROPHILS # BLD AUTO: 6.7 10E3/UL (ref 1.6–8.3)
NEUTROPHILS NFR BLD AUTO: 67 %
NRBC # BLD AUTO: 0 10E3/UL
NRBC BLD AUTO-RTO: 0 /100
PLATELET # BLD AUTO: 143 10E3/UL (ref 150–450)
POTASSIUM SERPL-SCNC: 3.6 MMOL/L (ref 3.4–5.3)
PROT SERPL-MCNC: 5.1 G/DL (ref 6.4–8.3)
RADIOLOGIST FLAGS: NORMAL
RBC # BLD AUTO: 3.18 10E6/UL (ref 3.8–5.2)
SODIUM SERPL-SCNC: 138 MMOL/L (ref 135–145)
T PALLIDUM AB SER QL: NONREACTIVE
WBC # BLD AUTO: 10 10E3/UL (ref 4–11)

## 2024-06-05 PROCEDURE — 36415 COLL VENOUS BLD VENIPUNCTURE: CPT | Performed by: HOSPITALIST

## 2024-06-05 PROCEDURE — 250N000011 HC RX IP 250 OP 636: Performed by: INTERNAL MEDICINE

## 2024-06-05 PROCEDURE — 258N000003 HC RX IP 258 OP 636

## 2024-06-05 PROCEDURE — 85610 PROTHROMBIN TIME: CPT

## 2024-06-05 PROCEDURE — 250N000013 HC RX MED GY IP 250 OP 250 PS 637

## 2024-06-05 PROCEDURE — 83605 ASSAY OF LACTIC ACID: CPT

## 2024-06-05 PROCEDURE — 36415 COLL VENOUS BLD VENIPUNCTURE: CPT

## 2024-06-05 PROCEDURE — 83615 LACTATE (LD) (LDH) ENZYME: CPT

## 2024-06-05 PROCEDURE — 120N000002 HC R&B MED SURG/OB UMMC

## 2024-06-05 PROCEDURE — 250N000011 HC RX IP 250 OP 636

## 2024-06-05 PROCEDURE — 99233 SBSQ HOSP IP/OBS HIGH 50: CPT | Mod: FS | Performed by: STUDENT IN AN ORGANIZED HEALTH CARE EDUCATION/TRAINING PROGRAM

## 2024-06-05 PROCEDURE — 84155 ASSAY OF PROTEIN SERUM: CPT

## 2024-06-05 PROCEDURE — 250N000011 HC RX IP 250 OP 636: Mod: JZ

## 2024-06-05 PROCEDURE — 86140 C-REACTIVE PROTEIN: CPT

## 2024-06-05 PROCEDURE — 99418 PROLNG IP/OBS E/M EA 15 MIN: CPT

## 2024-06-05 PROCEDURE — 85730 THROMBOPLASTIN TIME PARTIAL: CPT

## 2024-06-05 PROCEDURE — 99233 SBSQ HOSP IP/OBS HIGH 50: CPT

## 2024-06-05 PROCEDURE — 80048 BASIC METABOLIC PNL TOTAL CA: CPT | Performed by: HOSPITALIST

## 2024-06-05 PROCEDURE — 85004 AUTOMATED DIFF WBC COUNT: CPT | Performed by: HOSPITALIST

## 2024-06-05 PROCEDURE — 250N000013 HC RX MED GY IP 250 OP 250 PS 637: Performed by: STUDENT IN AN ORGANIZED HEALTH CARE EDUCATION/TRAINING PROGRAM

## 2024-06-05 RX ORDER — MEROPENEM 1 G/1
1 INJECTION, POWDER, FOR SOLUTION INTRAVENOUS EVERY 8 HOURS
Status: DISCONTINUED | OUTPATIENT
Start: 2024-06-05 | End: 2024-06-05

## 2024-06-05 RX ORDER — POLYETHYLENE GLYCOL 3350 17 G/17G
17 POWDER, FOR SOLUTION ORAL 2 TIMES DAILY
Status: DISCONTINUED | OUTPATIENT
Start: 2024-06-05 | End: 2024-06-08 | Stop reason: HOSPADM

## 2024-06-05 RX ORDER — LEVOFLOXACIN 5 MG/ML
750 INJECTION, SOLUTION INTRAVENOUS EVERY 24 HOURS
Status: DISCONTINUED | OUTPATIENT
Start: 2024-06-05 | End: 2024-06-05

## 2024-06-05 RX ORDER — METRONIDAZOLE 500 MG/100ML
500 INJECTION, SOLUTION INTRAVENOUS EVERY 12 HOURS
Qty: 1900 ML | Refills: 0 | Status: DISCONTINUED | OUTPATIENT
Start: 2024-06-05 | End: 2024-06-07

## 2024-06-05 RX ORDER — HYDROMORPHONE HYDROCHLORIDE 1 MG/ML
0.3 INJECTION, SOLUTION INTRAMUSCULAR; INTRAVENOUS; SUBCUTANEOUS ONCE
Status: COMPLETED | OUTPATIENT
Start: 2024-06-05 | End: 2024-06-05

## 2024-06-05 RX ORDER — HYDROMORPHONE HCL IN WATER/PF 6 MG/30 ML
0.2 PATIENT CONTROLLED ANALGESIA SYRINGE INTRAVENOUS ONCE
Status: COMPLETED | OUTPATIENT
Start: 2024-06-05 | End: 2024-06-05

## 2024-06-05 RX ORDER — LEVOFLOXACIN 5 MG/ML
750 INJECTION, SOLUTION INTRAVENOUS EVERY 24 HOURS
Qty: 10 EACH | Refills: 0 | Status: DISCONTINUED | OUTPATIENT
Start: 2024-06-05 | End: 2024-06-07

## 2024-06-05 RX ORDER — METRONIDAZOLE 500 MG/100ML
500 INJECTION, SOLUTION INTRAVENOUS EVERY 12 HOURS
Status: DISCONTINUED | OUTPATIENT
Start: 2024-06-05 | End: 2024-06-05

## 2024-06-05 RX ADMIN — HYDROMORPHONE HYDROCHLORIDE 0.4 MG: 0.2 INJECTION, SOLUTION INTRAMUSCULAR; INTRAVENOUS; SUBCUTANEOUS at 12:11

## 2024-06-05 RX ADMIN — HEPARIN SODIUM 5000 UNITS: 5000 INJECTION, SOLUTION INTRAVENOUS; SUBCUTANEOUS at 07:42

## 2024-06-05 RX ADMIN — METRONIDAZOLE 500 MG: 500 INJECTION, SOLUTION INTRAVENOUS at 21:15

## 2024-06-05 RX ADMIN — DICYCLOMINE HYDROCHLORIDE 10 MG: 10 CAPSULE ORAL at 07:42

## 2024-06-05 RX ADMIN — DICYCLOMINE HYDROCHLORIDE 10 MG: 10 CAPSULE ORAL at 16:48

## 2024-06-05 RX ADMIN — LEVOFLOXACIN 750 MG: 5 INJECTION, SOLUTION INTRAVENOUS at 20:07

## 2024-06-05 RX ADMIN — SIMETHICONE 80 MG: 80 TABLET, CHEWABLE ORAL at 10:46

## 2024-06-05 RX ADMIN — OXYCODONE HYDROCHLORIDE 10 MG: 5 TABLET ORAL at 05:03

## 2024-06-05 RX ADMIN — DICYCLOMINE HYDROCHLORIDE 10 MG: 10 CAPSULE ORAL at 21:05

## 2024-06-05 RX ADMIN — OXYCODONE HYDROCHLORIDE 5 MG: 5 TABLET ORAL at 21:11

## 2024-06-05 RX ADMIN — OXYCODONE HYDROCHLORIDE 5 MG: 5 TABLET ORAL at 10:43

## 2024-06-05 RX ADMIN — SODIUM CHLORIDE, POTASSIUM CHLORIDE, SODIUM LACTATE AND CALCIUM CHLORIDE: 600; 310; 30; 20 INJECTION, SOLUTION INTRAVENOUS at 05:01

## 2024-06-05 RX ADMIN — HYDROMORPHONE HYDROCHLORIDE 0.2 MG: 0.2 INJECTION, SOLUTION INTRAMUSCULAR; INTRAVENOUS; SUBCUTANEOUS at 17:21

## 2024-06-05 RX ADMIN — HYDROMORPHONE HYDROCHLORIDE 0.3 MG: 1 INJECTION, SOLUTION INTRAMUSCULAR; INTRAVENOUS; SUBCUTANEOUS at 23:04

## 2024-06-05 RX ADMIN — HYDROMORPHONE HYDROCHLORIDE 0.4 MG: 0.2 INJECTION, SOLUTION INTRAMUSCULAR; INTRAVENOUS; SUBCUTANEOUS at 19:40

## 2024-06-05 RX ADMIN — LORAZEPAM 2 MG: 2 INJECTION INTRAMUSCULAR; INTRAVENOUS at 18:17

## 2024-06-05 RX ADMIN — DICYCLOMINE HYDROCHLORIDE 10 MG: 10 CAPSULE ORAL at 12:08

## 2024-06-05 RX ADMIN — PIPERACILLIN AND TAZOBACTAM 4.5 G: 4; .5 INJECTION, POWDER, FOR SOLUTION INTRAVENOUS at 05:06

## 2024-06-05 RX ADMIN — SODIUM CHLORIDE, POTASSIUM CHLORIDE, SODIUM LACTATE AND CALCIUM CHLORIDE: 600; 310; 30; 20 INJECTION, SOLUTION INTRAVENOUS at 13:09

## 2024-06-05 RX ADMIN — MEROPENEM 1 G: 1 INJECTION, POWDER, FOR SOLUTION INTRAVENOUS at 09:18

## 2024-06-05 RX ADMIN — OXYCODONE HYDROCHLORIDE 5 MG: 5 TABLET ORAL at 14:41

## 2024-06-05 RX ADMIN — GLYCERIN, PETROLATUM, PHENYLEPHRINE HCL, PRAMOXINE HCL: 144; 2.5; 10; 15 CREAM TOPICAL at 16:49

## 2024-06-05 RX ADMIN — MEROPENEM 1 G: 1 INJECTION, POWDER, FOR SOLUTION INTRAVENOUS at 16:48

## 2024-06-05 RX ADMIN — SIMETHICONE 80 MG: 80 TABLET, CHEWABLE ORAL at 16:48

## 2024-06-05 ASSESSMENT — ACTIVITIES OF DAILY LIVING (ADL)
ADLS_ACUITY_SCORE: 23
ADLS_ACUITY_SCORE: 22
ADLS_ACUITY_SCORE: 22
ADLS_ACUITY_SCORE: 23
ADLS_ACUITY_SCORE: 22
ADLS_ACUITY_SCORE: 23
ADLS_ACUITY_SCORE: 25
ADLS_ACUITY_SCORE: 22
ADLS_ACUITY_SCORE: 25
ADLS_ACUITY_SCORE: 22
ADLS_ACUITY_SCORE: 23
ADLS_ACUITY_SCORE: 22

## 2024-06-05 NOTE — PROGRESS NOTES
[facility]    Medicine Progress Note - Hospitalist Service, GOLD TEAM 5    Date of Admission:  6/1/2024    Assessment & Plan   Alisha Crowe is a 43 year old female with a PMH significant for MDD, ADHD, PTSD, chronic pelvic pain (previously on chronic oxycodone), constipation, previous episodes of colitis who was admitted on 06/01/2024 from urgent care with abdominal pain and hypotension. Initially admitted to the MICU for septic shock and found to have e coli bacteremia and colitis. CRS and GI consulted.  Initially required pressors, but weaned off 06/02 and transferred to Medicine service on 06/03.      # Septic shock 2/2 E. coli bacteremia,2/2   # E. coli UTI, right sided pyelonephritis   Initially admitted to MICU to pressors, but was weaned off pressors 06/02 AM. Suspect bacteremia in the setting of acute colitis and bowel translocation, but cannot rule out UTI as well given positive UCx for E.coli. Allergies listed for ceftriaxone (rash) and ciprofloxacin (headache). TTE without EF 60-65% and no wall motion abnormality. No significant valvular abnormality. WBC trending down, but ongoing abdominal pain with softer BPs for now-see below. CT A/P below showing right sided hydronephrosis with some foci of focal nephronia concerning for right sided pyelo. Given E Coli was resistant to Zosyn on urine culture (but not blood culture), switched to Meropenum on 6/5. Given we are looking for an PO option, switched to levofloxacin and metronidazole. Discussed with pt and willing to try (headaches with Cipro).   - Stop Meropenum, start Levofloxacin 750 mg IV daily, metronidazole 500 mg q12hr for 10 day course from last negative Blood culture (6/3-6/13)--> switch to PO if tolerates and remains HDS  - Trend CBC daily   - Follow blood cultures, NGTD since 6/3/24  - Stop mIVF, gentle bolus as needed   - Monitor     # Acute on chronic abdominal pain, ongoing    # Right colonic bowel  thickening on CT non-contrast, resolved on repeat CT   # Concern for transient low flow state from hypotension   # Chronic pelvic pain   # Chronic constipation, resolved   # Diarrhea, resolved   # Suspected pelvic floor dysfunction   # Suspected hemorrhoids   # History of unspecified colitis  Initially presented with abdominal pain for ~2 weeks to urgent care.  Per chart review had episodes of colitis in the past in 2017 where patient underwent flex sig which was unremarkable at that time.No stool samples, but bacteremia with BCx with E.coli as above. US pelvis on admission with no acute findings. CT Ab/Pelvis without contrast with colitis of cecum and ascending colon. No evidence of mesenteric ischemic on US doppler. Fat stranding and bowel thickening on initial CT do not correlate with rationale for initial critical illness as above. Repeat CT A/P with contrast undertaken on 6/5 given worsening abdominal pain, hypotension (likely due to medications).  See discussion above, likely cause of pain and sepsis due to E coli UTI based on findings on 6/5 CT, negative enteric panel, resolving bowel wall thickening. Suspect ongoing abdominal pain is also from vaginal infection (+ bacterial vaginosis, positive candida on vaginal swab), urinary tract infection with pyelonephritis, transient low flow state from hypotension, mental health component given history of depression/PTSD, chronic pelvic pain, previously constipation now seems to have resolved.  - GI and Colorectal Surgery consulted, now signed off  - CAPs consult to discuss need for para in AM   - Mental health recs as below   - Consider pain consult if ongoing pain despite treatment for infection, PRNs below   - Trend CBC daily   - STD testing IP (HIV, Hep B/C, Chlamydia, Gonorrheae, Treponema)  - Mirlax BID, titrate up to TID per GI recs for 3 BMs weekly   - Bentryl 10 mg QID  - PRN Tucks, schedule prep H  - PRN simethicone, dicyclomine   - Will need GI and  Colonoscopy OP, GI to arrange   - Pelvic floor PT OP consult     # Hypotension, resolved   Likely due to medications given normalizing WBC, lnormal actic on 6/5. Improving with gentle fluid boluses and hydration   - Watch opiate/benzo dosing   - Recommend small mIVF bolus PRN    # MDD  # ADHD  # PTSD  Was previously on bupropion and mirtazapine, but no longer taking due to finances. PTA ativan 2mg TID PRN.   - Psychiatry and Health Psychology given labile mood, chronic pain  - Continue PRN ativan TID   - PCP and OP mental health referrals at discharge    # Bacterial vulvovaginitis   - Abx as above    # Candida vulvovaginitis   - Fluconazole 150 mg x1, repeat after abx course     # YOLANDA, improving   See 2.61 on admission, improving to 1.74. Likely in the setting of septic shock and UTI as above.   - Trend BMP daily   - Treat infections as above   - Continue gentle hydration     Creatinine   Date Value Ref Range Status   06/04/2024 1.28 (H) 0.51 - 0.95 mg/dL Final   06/04/2024 1.44 (H) 0.51 - 0.95 mg/dL Final   06/03/2024 1.74 (H) 0.51 - 0.95 mg/dL Final   06/02/2024 1.87 (H) 0.51 - 0.95 mg/dL Final     # Focal renal hypodensities, suspect possible cyst with hemorrhagic or proteinases contents   Seen on CT A/P Hgb relatively stable as below.   - Ultrasound follow-up OP unless clinically decompensates.   - Trend WBC, Hgb   - Treat infection as above    # B/L pleural effusions  Seen on CT A/P. On RA. No shortness of breath. No cough, ongoing fevers, broad coverage throughout stay, so less likely ID and more likely due to fluid overload.  - Discuss with CAPs team to see if any need for thora/para in AM  - Stop unneeded mIVF  - IS     # Normocytic anemia  No signs or symptoms of acute bleeding. Possible dilutional component with recent boluses in the setting of shock.   -Trend CBC daily      # Thrombocytopenia, mild   No signs or symptoms of acute bleeding. Possible dilutional component with recent boluses in the setting  "of shock.   -Trend CBC daily     #Asthma: without acute exacerbation      Resolved:   # Hyponatremia, mild, resolved           Diet: Regular Diet Adult    DVT Prophylaxis: Heparin SQ  Eli Catheter: Not present  Lines: None     Cardiac Monitoring: None  Code Status: Full Code      Clinically Significant Risk Factors              # Hypoalbuminemia: Lowest albumin = 2.3 g/dL at 6/4/2024  2:11 PM, will monitor as appropriate                  # Obesity: Estimated body mass index is 32.46 kg/m  as calculated from the following:    Height as of this encounter: 1.473 m (4' 10\").    Weight as of this encounter: 70.4 kg (155 lb 4.8 oz)., PRESENT ON ADMISSION            Disposition Plan     Medically Ready for Discharge: Anticipated in 2-4 Days       The patient's care was discussed with the Attending Physician, Dr. Hopkins, Bedside Nurse, Patient, and GI consultant.    BILLIE August [facility]  Hospitalist Service, [address]  Securely message with [employer] (more info)  Text page via [employer] Paging/Directory   See signed in provider for up to date coverage information    This chart documentation was completed with Dragon voice-recognition software. Even though reviewed, this chart may still contain some grammatical, spelling, and word errors. Please contact the author for any questions or clarifications.     ______________________________________________________________________    Interval History   Nursing/SW/consult/interdisciplinary healthcare worker's notes reviewed.     I reviewed all medications, new labs and imaging results over the last 24 hours. Please see discussion of these results in the A/P.    No acute events overnight. BP trending in 80s/50s overnight with HR in 50s. Given LR bolus with improvement int o the 90s/40-60s.Pain still 4-8,  still in generalized abdomen, radiating to back with B/L flank pain.Still feels like \"air bubbles moving throughout.\" " "Denies any nausea, vomiting.  7x BMs yesterday, loose stool reported.  No bowel movements noted today.  Slow to increase intake per discussion with nursing.     Very labile mood. Wanted to leave due to her son having depression vs bipolar disorder and having trouble at home. Discussed concerns about her bloodstream infection as well as her kidney infection and discussed that she would risk morbity and mortality with leaving without proper treatment. Ok to try levofloxacin given headaches with Cipro. Noting having abdominal pain after \"letting out a lot of gas.\"     ROS: 12 point ROS negative other than the symptoms noted here or above in the assessment and plan.     Physical Exam   Vital Signs: Temp: 99.3  F (37.4  C) Temp src: Oral BP: 90/45 Pulse: 55   Resp: 16 SpO2: 96 % O2 Device: None (Room air)    Weight: 155 lbs 4.8 oz    General Appearance:  In mild acute distress in bed, clutching abdomen   Respiratory: LS clear b/l, normal RR  Cardiovascular: S1, S2, no m/r/g  GI: BS+, all 4 quadrants, no masses, +B/L flank tenderness   Skin: Intact on face, arms, legs. No wounds, bruising, or lesions noted. Dirty fingernails   Neuropsych:A&Ox4, moving all extremities, labile moods     Medical Decision Making       60 MINUTES SPENT BY ME on the date of service doing chart review, history, exam, documentation & further activities per the note.      Data     I have personally reviewed the following data over the past 24 hrs:    10.0  \   9.5 (L)   / 143 (L)     138 107 11.2 /  81   3.6 20 (L) 1.28 (H) \     ALT: N/A AST: N/A AP: N/A TBILI: N/A   ALB: N/A TOT PROTEIN: N/A LIPASE: N/A     Procal: N/A CRP: 69.00 (H) Lactic Acid: 0.9         Imaging results reviewed over the past 24 hrs:   No results found for this or any previous visit (from the past 24 hour(s)).  "
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
## 2024-06-05 NOTE — PROGRESS NOTES
Antimicrobial Stewardship Team Note    Antimicrobial Stewardship Program - A joint venture between [facility] and  Physicians to optimize antibiotic management.  NOT a formal consult - Restricted Antimicrobial Review     Patient: Alisha Crowe  MRN: [id number]  Allergies: Codeine, Meperidine, Ceftriaxone, Ciprofloxacin, Hydrocodone-acetaminophen, Nalbuphine, and Tylenol [acetaminophen]    Brief Summary: Alisha Crowe is a 43 year old female with history of asthma, MDD, ADHD, PTSD, chronic pelvic pain, chronic constipation, history of colitis (2017), and cholecystectomy (5/2016) who transferred from urgent care on 6/1/2024 with worsening abdominal pain and hypotension found to have E. coli bacteremia.    History of Present Illness: Patient reported having intermittent abdominal pain that radiates to back for 2 weeks, acutely worsened PTA with fever and diaphoresis. Endorsed constipation, also has chronic constipation at baseline. On presentation to the ED, patient was initially afebrile later spiked a fever (103.3) with persistent hypotension despite fluid resuscitation, started on low dose norepinephrine, stopped on 6/2. All other VSS. Labs were notable for leukocytosis (WBC 29.6, ANC 28.4), YOLANDA (Scr 3.12, baseline ~1), and procalcitonin 84.7. Lactic acid within normal limits and UA with some pyuria (63 WBC) and large leukocyte esterase. Patient was started on empiric Zosyn and IV vancomycin for septic shock of unclear source. CT abdomen/pelvis concerning for colitis with bowel wall thickening and adjacent inflammatory changes involving the cecum and ascending colon; differential include infectious, inflammatory or possible ischemic etiology given history of hypotension. Pelvic US was negative for ovarian torsion. Oral vancomycin and IV metronidazole were initially added due to concern for C diff associated colitis pending C diff test, later discontinued on 6/2 due to no BM and started on bowel  regimen. Nares MRSA PCR test negative and IV vancomycin discontinued.     Blood cultures on 6/1 and 6/2 are both positive (4 out of 4 bottles) with Gram-negative bacilli identified by as E .coli without any resistance gene by [employer]. Susceptibility results notable for E coli resistant to ampicillin, ampicillin/sulbactam, and Bactrim. Urine culture grew <10,000 CFU/mL Escherichia coli, resistant to ampicillin, ampicillin/sulbactam, piperacillin/tazobactam and Bactrim. Repeat blood cultures on 6/3 are NGTD x2 day. Multiplex vaginal panel by PCR ordered due to vaginal itching, test positive for Bacterial vaginosis and Candida group, received a dose of oral fluconazole 150 mg for Vulvovaginal candidiasis. C diff PCR and enteric panel PCR negative. Urine Chlamydia trachomatis/Neisseria gonorrhoeae by PCR negative as part of PID workup. Today, Zosyn was changed to meropenem given E coli resistance on urine culture with a plan to complete a total of 7-14 days of therapy.    Colorectal surgery consulted with no plans for surgical procedure and plan for outpatient colonoscopy with GI.         Active Anti-infective Medications   (From admission, onward)                 Start     Stop    06/05/24 0830  meropenem  1 g,   Intravenous,   EVERY 8 HOURS        Bacteremia, Urinary Tract Infection       --                  Assessment: E coli bacteremia 2/2 abdominal vs urinary source   Patient presented with septic shock found to have E coli bacteremia likely related to intraabdominal translocation in the setting of colitis of unclear source. Of note, patient does have history of colitis in 2017 deemed to be related to chronic constipation with unremarkable colonoscopy results, suspected to be related to pelvic floor dysfunction with PID workup pending. New diarrhea and abdominal cramping related to bowel regimen started for constipation. Urine culture is also positive with E coli, though different susceptibility pattern than the E  coli in blood. Patient did not present with urinary symptoms and no acute changes of urinary tract on CT. Reasonable to target both E coli isolates. Overall, patient's clinical status has been improving as patient has been off pressor support and remains hemodynamically stable. Fever curve trended down and WBC has normalized. Blood culture remain without growth on empiric Zosyn. No evidence of ESBL producing organisms to warrant carbapenem therapy. We recommend de-escalating meropnem to levofloxacin for targeted E coli therapy plus metronidazole for anaerobic coverage given likely intraabdominal source as well as bacterial vaginosis. Given clinical improvement and blood culture clearance, we recommend a total duration of 10-14 days from the first negative blood cultures.    Recommendations:  Deescalate meropenem to levofloxacin 750 mg daily and metronidazole 500 mg every 12 hours   Total duration of 10-14 days of therapy (end date 6/12 or 6/16) from the first negative blood cultures     Pharmacy took the following actions: Electronic note created, Called/paged provider.    Discussed with ID Staff: Renaldo Israel MD, M.Med.Sc.  Payal Gallardo, PharmD, [facility]  Pager: [phone]    Vital Signs/Clinical Features:  Vitals         06/03 0700  06/04 0659 06/04 0700  06/05 0659 06/05 0700  06/05 1318   Most Recent      Temp ( F) 98 -  99.4    98.3 -  100.4       99.3 (37.4) 06/05 0551    Pulse 62 -  92    51 -  55       55 06/05 0551    Resp 17 -  27      16       16 06/05 0551    BP 91/62 -  106/77    81/58 -  100/68       90/45 06/05 0551    SpO2 (%) 91 -  100    96 -  100       96 06/05 0551            Labs  Estimated Creatinine Clearance: 66.1 mL/min (A) (based on SCr of 1.28 mg/dL (H)).  Recent Labs   Lab Test 06/02/24  0508 06/02/24  1548 06/03/24  0507 06/04/24  0347 06/04/24  1411 06/05/24  0632   CR 2.23* 1.87* 1.74* 1.44* 1.28* 1.28*       Recent Labs   Lab Test 06/01/24  1201 06/01/24  1209  06/02/24  0508 06/03/24  0507 06/04/24  0347 06/05/24  0632   WBC 29.6*  --  22.2* 15.5* 10.8 10.0   ANEU 28.4*  --  21.3*  --   --   --    ALYM 0.0*  --  0.9  --   --   --    THOM 1.2  --  0.0  --   --   --    AEOS 0.0  --  0.0  --   --   --    HGB 11.1* 10.9* 10.6* 9.9* 10.1* 9.5*   HCT 32.7* 32* 30.6* 29.4* 30.9* 29.0*   MCV 90  --  88 88 90 91   *  --  116* 107* 131* 143*       Recent Labs   Lab Test 06/01/24  1201 06/01/24  2152 06/04/24  0347 06/04/24  1411   BILITOTAL 0.3 0.4 0.7 0.6   ALKPHOS 64 87 101 87   ALBUMIN 3.2* 3.0* 3.0* 2.3*   AST 75* 74* 38 24   ALT 70* 66* 36 25       Recent Labs   Lab Test 06/01/24  1201 06/01/24  1203 06/02/24  1548 06/04/24  0347 06/05/24  0632 06/05/24  0747   PCAL 84.70*  --   --   --   --   --    LACT 1.9 1.8 1.4 1.4  --  0.9   CRPI  --   --   --   --  69.00*  --              Culture Results:  7-Day Micro Results       Procedure Component Value Units Date/Time    Chlamydia trachomatis/Neisseria gonorrhoeae by PCR [[id number]]  (Normal) Collected: 06/04/24 1609    Order Status: Completed Lab Status: Final result Updated: 06/05/24 1147    Specimen: Urine, Voided      Chlamydia Trachomatis Negative     Comment: Negative for C. trachomatis rRNA by transcription mediated amplification.   A negative result by transcription mediated amplification does not preclude the presence of infection because results are dependent on proper and adequate collection, absence of inhibitors and sufficient rRNA to be detected.        Neisseria gonorrhoeae Negative     Comment: Negative for N. gonorrhoeae rRNA by transcription mediated amplification. A negative result by transcription mediated amplification does not preclude the presence of C. trachomatis infection because results are dependent on proper and adequate collection, absence of inhibitors and sufficient rRNA to be detected.       Enteric Bacteria and Virus Panel PCR [[id number]]  (Normal) Collected: 06/04/24 1322    Order  Status: Completed Lab Status: Final result Updated: 06/04/24 0728    Specimen: Stool from Per Rectum      Campylobacter species Negative     Salmonella species Negative     Vibrio species Negative     Vibrio cholerae Negative     Yersinia enterocolitica Negative     Enteropathogenic E. coli (EPEC) Negative     Shiga-like toxin-producing E. coli (STEC) Negative     Shigella/Enteroinvasive E. coli (EIEC) Negative     Cryptosporidium species Negative     Giardia lamblia Negative     Norovirus Gl/Gll Negative     Rotavirus A Negative     Plesiomonas shigelloides Negative     Enteroaggregative E. coli (EAEC) Negative     Enterotoxigenic E. coli (ETEC) Negative     E. coli O157 NA     Cyclospora cayetanensis Negative     Entamoeba histolytica Negative     Adenovirus F40/41 Negative     Astrovirus Negative     Sapovirus Negative    Narrative:      Assay performed using the FDA-cleared BitMethod GI Panel from [employer].  A negative result should not rule out infection in patients with a probability for gastrointestinal infection. The assay does not test for all potential infectious agents of diarrheal disease.  Positive results do not distinguish between a viable or replicating organism and the presence of a nonviable organism or nucleic acid, nor do they exclude the possibility of coinfection by organisms not in the panel.  Results are intended to aid in the diagnosis of illness and are meant to be used in conjunction with other clinical findings.  This test has been verified and is performed by the [facility] at [facility]. This laboratory is certified under the Clinical Laboratory Improvement Amendments of 1988 (CLIA-88) as qualified to perform high complexity clinical laboratory testing.    C. difficile Toxin B PCR with reflex to C. difficile Antigen and Toxins A/B EIA [[id number]]  (Normal) Collected: 06/04/24 1322    Order Status: Completed Lab Status: Final  result Updated: 06/04/24 1445    Specimen: Stool from Per Rectum      C Difficile Toxin B by PCR Negative     Comment: A negative result does not exclude actual disease due to C. difficile and may be due to improper collection, handling and storage of the specimen or the number of organisms in the specimen is below the detection limit of the assay.       Narrative:      The [employer] Xpert C. difficile Assay, performed on the [employer], is a qualitative in vitro diagnostic test for rapid detection of toxin B gene sequences from unformed (liquid or soft) stool specimens collected from patients suspected of having Clostridioides difficile infection (CDI). The test utilizes automated real-time polymerase chain reaction (PCR) to detect toxin gene sequences associated with toxin producing C. difficile. The Xpert C. difficile Assay is intended as an aid in the diagnosis of CDI.    Chlamydia trachomatis/Neisseria gonorrhoeae by PCR     Order Status: Canceled Lab Status: No result     Specimen: Swab     Multiplex Vaginal Panel by PCR     Order Status: Canceled Lab Status: No result     Specimen: Swab from Vagina     Multiplex Vaginal Panel by PCR [[id number]]  (Abnormal) Collected: 06/04/24 0513    Order Status: Completed Lab Status: Final result Updated: 06/04/24 0654    Specimen: Swab from Vagina      Bacterial Vaginosis Organism DNA Positive     Comment: Indicator DNA target(s) related to bacterial vaginosis organisms is/are detected.  Organisms associated with bacterial vaginosis that are targeted in this assay include Atopobium spp., Bacterial Vaginosis-Associated Bacterium-2, and Megasphaera-1. Detected organisms are not reported individually.        Candida Group DNA Detected     Comment: Candida group species detected by this target include C. albicans, C. tropicalis, C. parapsilosis, C. dubliniensis. Species are not differenetiated.  A Candida group positive result can be due to one or  multiple Candida species.        Candida glabrata / Anna krusei DNA Detected     Comment: Species are not differentiated for the dual target.        Trichomonas vaginalis DNA Not Detected    Narrative:      The Xpert  Xpress MVP test, performed on the [employer], is an automated, qualitative in vitro diagnostic test for the detection of DNA targets from anaerobic bacteria associated with bacterial vaginosis, Candida species associated with vulvovaginal candidiasis, and Trichomonas vaginalis. The assay uses clinician-collected and self-collected vaginal swabs from patients who are symptomatic for vaginitis/ vaginosis. The Xpert  Xpress MVP test utilizes real-time polymerase chain reaction (PCR) for the amplification of specific DNA targets and utilizes fluorogenic target-specific hybridization probes to detect and differentiate DNA. It is intended to aid in the diagnosis of vaginal infections in women with a clinical presentation consistent with bacterial vaginosis, vulvovaginal candidiasis, or trichomoniasis.   The assay targets three anaerobic microorgansims that are associated with bacterial vaginosis (BV). Other organisms that are not detected by the Xpert  Xpress MVP test have also been reported to be associated with BV. The BV organism and Candida species targets of the Xpert  Xpress MVP test can be commensal in women; positive results must be considered in conjunction with other clinical and patient information to determine the disease status.    Multiplex Vaginal Panel by PCR [[id number]] Collected: 06/03/24 2306    Order Status: Canceled Lab Status: No result Updated: 06/03/24 2327    Specimen: Swab from Vagina     Blood Culture Hand, Left [[id number]]  (Normal) Collected: 06/03/24 1646    Order Status: Completed Lab Status: Preliminary result Updated: 06/04/24 1832    Specimen: Blood from Hand, Left      Culture No growth after 1 day    Blood Culture Arm, Left [[id number]]   (Normal) Collected: 06/03/24 1641    Order Status: Completed Lab Status: Preliminary result Updated: 06/04/24 1832    Specimen: Blood from Arm, Left      Culture No growth after 1 day    Blood Culture Peripheral Blood     Order Status: Canceled Lab Status: No result     Specimen: Peripheral Blood     Blood Culture Peripheral Blood     Order Status: Canceled Lab Status: No result     Specimen: Peripheral Blood     C. difficile Toxin B PCR with reflex to C. difficile Antigen and Toxins A/B EIA     Order Status: Canceled Lab Status: No result     Specimen: Stool from Per Rectum     Blood Culture Arm, Left [[id number]]  (Abnormal) Collected: 06/02/24 0610    Order Status: Completed Lab Status: Final result Updated: 06/04/24 0657    Specimen: Blood from Arm, Left      Culture Positive on the 1st day of incubation      Escherichia coli     Comment: 2 of 2 bottles  Susceptibilities done on previous cultures       Blood Culture Arm, Left [[id number]]  (Abnormal) Collected: 06/02/24 0556    Order Status: Completed Lab Status: Final result Updated: 06/04/24 0658    Specimen: Blood from Arm, Left      Culture Positive on the 1st day of incubation      Escherichia coli     Comment: 2 of 2 bottles  Susceptibilities done on previous cultures       MRSA MSSA PCR, Nasal Swab [[id number]] Collected: 06/01/24 1752    Order Status: Completed Lab Status: Final result Updated: 06/01/24 1933    Specimen: Swab from Nares, Bilateral      MRSA Target DNA Negative     SA Target DNA Negative    Narrative:      The [employer]  Xpert SA Nasal Complete assay performed in the [employer] System is a qualitative in vitro diagnostic test designed for rapid detection of Staphylococcus aureus (SA) and methicillin-resistant Staphylococcus aureus (MRSA) from nasal swabs in patients at risk for nasal colonization. The test utilizes automated real-time polymerase chain reaction (PCR) to detect MRSA/SA DNA. The Xpert SA Nasal Complete assay is  intended to aid in the prevention and control of MRSA/SA infections in healthcare settings. The assay is not intended to diagnose, guide or monitor treatment for MRSA/SA infections, or provide results of susceptibility to methicillin. A negative result does not preclude MRSA/SA nasal colonization.     C. difficile Toxin B PCR with reflex to C. difficile Antigen and Toxins A/B EIA     Order Status: Canceled Lab Status: No result     Specimen: Stool from Per Rectum     Urine Culture Aerobic Bacterial [[id number]]  (Abnormal)  (Susceptibility) Collected: 06/01/24 1458    Order Status: Completed Lab Status: Final result Updated: 06/04/24 0822    Specimen: Urine, Straight Catheter      Culture <10,000 CFU/mL Escherichia coli      <10,000 CFU/mL Urogenital rene    Susceptibility       Escherichia coli (1)       Antibiotic Interpretation Sensitivity   Method Status    Ampicillin Resistant >=32 ug/mL KAELYN Final    Ampicillin/ Sulbactam Resistant >=32 ug/mL KAELYN Final    Piperacillin/Tazobactam Resistant 64 ug/mL KAELYN Final    Cefazolin Susceptible <=4 ug/mL KAELYN Final     Cefazolin KAELYN breakpoints are for the treatment of uncomplicated urinary tract infections. For the treatment of systemic infections, please contact the laboratory for additional testing.       Cefoxitin Susceptible <=4 ug/mL KAELYN Final    Ceftazidime Susceptible <=1 ug/mL KAELYN Final    Ceftriaxone Susceptible <=1 ug/mL KAELYN Final    Cefepime Susceptible <=1 ug/mL KAELYN Final    Extended Spectrum Beta-Lactamase  [*]  ESBL Negative Negative ug/mL KAELYN Final    Meropenem  [*]  Susceptible <=0.25 ug/mL KAELYN Final    Amikacin  [*]  Susceptible <=2 ug/mL KAELYN Final    Gentamicin Susceptible <=1 ug/mL KAELYN Final    Tobramycin Susceptible <=1 ug/mL KAELYN Final    Ciprofloxacin Susceptible <=0.25 ug/mL KAELYN Final    Levofloxacin Susceptible <=0.12 ug/mL KAELYN Final    Nitrofurantoin Susceptible <=16 ug/mL KAELYN Final    Trimethoprim/Sulfamethoxazole Resistant >16/304 ug/mL KAELYN  Final               [*]  Suppressed Antibiotic                   Blood Culture Peripheral Blood [[id number]]  (Abnormal)  (Susceptibility) Collected: 06/01/24 1208    Order Status: Completed Lab Status: Edited Result - FINAL Updated: 06/04/24 0744    Specimen: Peripheral Blood      Culture Positive on the 1st day of incubation      Escherichia coli     Comment: 2 of 2 bottles       Susceptibility       Escherichia coli (1)       Antibiotic Interpretation Sensitivity   Method Status    Ampicillin Resistant >=32 ug/mL KAELYN Final    Ampicillin/ Sulbactam Resistant >=32 ug/mL KAELYN Final    Piperacillin/Tazobactam Susceptible <=4 ug/mL KAELYN Final    Cefoxitin  [*]  Susceptible <=4 ug/mL KAELYN Final    Ceftazidime Susceptible <=1 ug/mL KAELYN Final    Ceftriaxone Susceptible <=1 ug/mL KAELYN Final    Cefepime Susceptible <=1 ug/mL KAELYN Final    Extended Spectrum Beta-Lactamase  [*]  ESBL Negative Negative ug/mL KAELYN Final    Meropenem Susceptible <=0.25 ug/mL KAELYN Final    Amikacin  [*]  Susceptible <=2 ug/mL KAELYN Final    Gentamicin Susceptible <=1 ug/mL KAELYN Final    Tobramycin Susceptible <=1 ug/mL KAELYN Final    Ciprofloxacin Susceptible <=0.25 ug/mL KAELYN Final    Levofloxacin Susceptible <=0.12 ug/mL KAELYN Final    Nitrofurantoin  [*]  Susceptible <=16 ug/mL KAELYN Final    Trimethoprim/Sulfamethoxazole Resistant >16/304 ug/mL KAELYN Final               [*]  Suppressed Antibiotic                   Blood Culture Peripheral Blood [[id number]]  (Abnormal) Collected: 06/01/24 1208    Order Status: Completed Lab Status: Final result Updated: 06/04/24 0656    Specimen: Peripheral Blood      Culture Positive on the 1st day of incubation      Escherichia coli     Comment: 2 of 2 bottles  Susceptibilities done on previous cultures       Verigene GN Panel [[id number]]  (Abnormal) Collected: 06/01/24 1208    Order Status: Completed Lab Status: Final result Updated: 06/02/24 0049    Specimen: Peripheral Blood      Acinetobacter species Not Detected      Citrobacter species Not Detected     Enterobacter species Not Detected     Proteus species Not Detected     Escherichia coli Detected     Comment: Positive for Escherichia coli by Verigene multiplex nucleic acid test. Final identification and antimicrobial susceptibility testing will be verified by standard methods. Verigene test will not distinguish E. coli from Shigella species including Shigella dysenteriae, Shigella flexneri, Shigella boydii, and Shigella sonnei. Specimens containing Shigella species or E. coli will be reported as positive for E. coli.        Klebsiella pneumoniae Not Detected     Klebsiella oxytoca Not Detected     Pseudomonas aeruginosa Not Detected     CTX-M Not Detected     KPC Not Detected     NDM Not Detected     VIM Not Detected     IMP Not Detected     OXA Not Detected    Narrative:      Specimen tested with Verigene multiplex, gram-negative blood culture nucleic acid test for the following targets: Acinetobacter species, Citrobacter species, Enterobacter species, Proteus species, Escherichia coli, Klebsiella pneumoniae, Klebsiella oxytoca, Pseudomonas aeruginosa, and the following resistance markers: CTX-M, KPC, NDM, VIM, IMP and OXA.            Recent Labs   Lab Test 06/01/24  1458   URINEPH 6.0   NITRITE Negative   LEUKEST Large*   WBCU 63*                   Recent Labs   Lab Test 06/04/24  1322   CDBPCT Negative       Imaging: CT Abdomen Pelvis w Contrast    Result Date: 6/5/2024  EXAMINATION: CT ABDOMEN PELVIS W CONTRAST, 6/4/2024 3:56 PM INDICATION: Ongoing, constant abdominal pain with loose stool and E.coli colitis, assess for interval changes COMPARISON STUDY: CT abdomen and pelvis 6/1/2024 TECHNIQUE: CT scan of the abdomen and pelvis was performed on multidetector CT scanner using volumetric acquisition technique and images were reconstructed in multiple planes with variable thickness and reviewed on dedicated workstations. CONTRAST: 59 mL Isovue injected IV without oral  contrast CT scan radiation dose is optimized to minimum requisite dose using automated dose modulation techniques. FINDINGS: Lower chest: Bilateral pleural effusions, right more than left, increased compared to prior. Right basilar atelectasis. No pericardial effusion. Patchy groundglass densities in the lung bases, right more than left. Abdomen and pelvis: Hepatobiliary: Mild hepatomegaly. Geographic area of hypoattenuation adjacent to the falciform ligament, favor focal fatty infiltration. Portal vein is patent. Gallbladder is surgically absent. Mild periportal edema, subtle heterogenous hepatic enhancement. No focal pancreatic mass or pancreatic ductal dilatation. Spleen is not enlarged. Right kidney appears larger than left measuring 12.5 cm compared to 9.9 cm. Heterogeneous enhancement of the right kidney, with multifocal areas of subtle hypoattenuation and few areas of ill marginated hypodensity for example along the superior pole (6/36). Mild right-sided hydronephrosis with increased urothelial enhancement. Focal hypoattenuation in the posterior midpole of the right kidney (4/96), measuring 1.4 cm and additional hypodensity seen in the left kidney with adjacent calcification.. Urinary bladder is partially distended. No high-grade bowel obstruction. Hyperdense contents in the cecum. No significant bowel wall thickening or pneumatosis. No pneumoperitoneum. No new significant enlarged lymphadenopathy. Few prominent inguinal lymph nodes, likely reactive. Free fluid in the pelvis. Uterus within normal limits. No pelvic mass... No aggressive osseous lesion. Increased dependent fatty streakiness in the subcutaneous posterior abdominal wall midline, possibly congestive.     Impression: 1. Right kidney is larger than left with multifocal patchy areas of hypodensity involving the right kidney, mild right-sided hydronephrosis with urothelial enhancement with  few ill marginated areas of peripheral hypodensity, findings  suggest infiltrative etiology including pyelonephritis with possible foci of focal nephronia. Recommend correlation with urinalysis and consider imaging follow-up to resolution to exclude less likely infiltrative neoplastic etiology. 2. Focal renal circumscribed hypodensities with intermediate attenuation, possibly cysts with hemorrhagic or proteinaceous contents may consider ultrasound for evaluation. 3. Hepatomegaly with mildly heterogeneous appearance of the liver with periportal edema may represent congestive or inflammatory changes. 4. Increased bilateral effusion, right more than left compared to prior CT from 6/1/2024. 5. Increased ascites compared to CT from 6/1/2024. 6. No high-grade bowel obstruction, pneumatosis or significant bowel wall thickening. 7. Patchy groundglass density in the lung bases right more than left may represent atelectasis, pulmonary edema or infection/inflammation. [Recommend Follow Up: Concern for right-sided pyelonephritis] This report will be copied to the [facility] to ensure a provider acknowledges the finding. [facility] is available Monday through Friday 8am-3:30 pm. I have personally reviewed the examination and initial interpretation and I agree with the findings. CARMEN FRANCIS MD   SYSTEM ID:  [id number]    US Abdominal Doppler Complete    Result Date: 6/3/2024  ULTRASOUND ABDOMEN OR PELVIS ARTERIAL AND VENOUS DOPPLER 6/2/2024 3:40 PM CLINICAL HISTORY: assess mesenteric arteries/veins r/o mesenteric ischemia in the setting of pain out of proportion of the abdomen, septic shock from E.coli bacteremia.. COMPARISONS: CT abdomen and pelvis without contrast 6/1/2024 REFERRING PROVIDER: FREDDIE FRANKLIN TECHNIQUE: Aorta and mesenteric arteries evaluated with grayscale, color Doppler, and spectral pulsed wave Doppler ultrasound. Inferior vena cava, hepatic, portal, superior mesenteric, and splenic veins evaluated with grayscale, color Doppler, and spectral pulsed  wave Doppler ultrasound. FINDINGS: Aorta, proximal: 2.0 cm, 149/0 cm/s, triphasic Celiac artery, origin: 114/24 cm/s, arterial monophasic Celiac artery, mid: 93/17 cm/s, arterial monophasic Celiac artery, distal: 68/10 cm/s, arterial monophasic Hepatic artery: 71/20 cm/s, arterial monophasic Right hepatic artery: 34/10 cm/s, arterial monophasic Left hepatic artery: 47/13 cm/s, arterial monophasic Splenic vein: 10 cm/s, phasic, 12 cm/s, phasic Superior mesenteric vein: 22 cm/s, phasic Main portal vein: 29 cm/s, phasic, antegrade Right portal vein: 20 cm/s, phasic, antegrade Left portal vein: 20 cm/s, phasic, antegrade Right hepatic vein: 38 cm/s, antegrade Middle hepatic vein: 50 cm/s, antegrade Left hepatic vein: 50 cm/s, antegrade Inferior vena cava: 2.8 cm, 37 cm/s, phasic Superior mesenteric artery, origin: 151/22 cm/s, triphasic Superior mesenteric artery, mid: 162/0 cm/s, triphasic Superior mesenteric artery, distal: 124/0 cm/s, biphasic Aorta, below superior mesenteric artery: 107/0 cm/s, triphasic     IMPRESSION: Inferior mesenteric artery not evaluated. Otherwise negative study. I have personally reviewed the examination and initial interpretation and I agree with the findings. LINDA WASHINGTON MD   SYSTEM ID:  [id number]    Echo Complete    Result Date: 2024  [id number]^MANISHA^BEN  [facility] [facility] [address] [place] [id number]  Name: ROBER MARCIAL MRN: [id number] : 1980 Study Date: 2024 08:18 AM Age: 43 yrs Gender: Female Patient Location: [facility] [place] Reason For Study: Heart Failure Ordering Physician: BEN DYER Performed By: Nick Duran RDCS  BSA: 1.7 m2 Height: 58 in Weight: 160 lb HR: 78 BP: 82/56 mmHg ______________________________________________________________________________ Procedure Complete Portable Echo Adult.  ______________________________________________________________________________ Interpretation Summary Left ventricular size, wall motion and function are normal. The ejection fraction is 60-65%. Left ventricular diastolic function is normal. Right ventricular function, chamber size, wall motion, and thickness are normal. Dilation of the inferior vena cava is present with abnormal respiratory variation in diameter. There is no prior study for direct comparison. ______________________________________________________________________________ Left Ventricle Left ventricular size, wall motion and function are normal. The ejection fraction is 60-65%. Left ventricular diastolic function is normal.  Right Ventricle Right ventricular function, chamber size, wall motion, and thickness are normal.  Atria The right atria appears normal. Mild left atrial enlargement is present.  Mitral Valve The mitral valve is normal. Trace mitral insufficiency is present.  Aortic Valve Aortic valve is normal in structure and function. The aortic valve is tricuspid.  Tricuspid Valve The tricuspid valve is normal. Trace tricuspid insufficiency is present. Pulmonary artery systolic pressure is normal.  Pulmonic Valve The pulmonic valve is normal. Trace pulmonic insufficiency is present.  Vessels Normal diameter aortic root and proximal ascending aorta. Dilation of the inferior vena cava is present with abnormal respiratory variation in diameter.  Pericardium No pericardial effusion is present.  Compared to Previous Study There is no prior study for direct comparison. ______________________________________________________________________________ MMode/2D Measurements & Calculations  IVSd: 0.95 cm LVIDd: 4.2 cm LVIDs: 2.8 cm LVPWd: 0.94 cm FS: 34.0 % LV mass(C)d: 128.3 grams LV mass(C)dI: 77.5 grams/m2 asc Aorta Diam: 3.0 cm Asc Ao diam index BSA (cm/m2): 1.8 Asc Ao diam index Ht(cm/m): 2.0 LA Volume Index (BP): 32.6 ml/m2 RWT: 0.45 TAPSE: 2.3 cm   Doppler Measurements & Calculations MV E max zoë: 106.6 cm/sec MV A max zoë: 63.5 cm/sec MV E/A: 1.7 MV dec slope: 547.1 cm/sec2 MV dec time: 0.19 sec PA acc time: 0.07 sec E/E' av.9 Lateral E/e': 7.9 Medial E/e': 11.9  ______________________________________________________________________________ Report approved by: Puneet Regan 2024 11:36 AM       US Pelvic Complete with Transvaginal    Result Date: 2024  EXAMINATION: US PELVIC TRANSABDOMINAL AND TRANSVAGINAL, 2024 5:19 PM COMPARISON: 2024 HISTORY: concern for ovarian torsion TECHNIQUE: The was scanned in standard fashion with transabdominal and transvaginal transducer(s) using grey scale, color Doppler, and spectral flow techniques. FINDINGS: Both ovaries are normal in size and there is normal blood flow to both ovaries.  No evidence of an adnexal mass.  The right ovary measures 3.0 x 1.8 x 2.3 cm and the left ovary measures 3.6 x 1.8 x 2.8 cm. The uterus measures 8.7 x 5.0 x 3.8 cm, and there is no evidence of a focal fibroid. Endometrium was not well visualized. There is no free fluid in the pelvis.     IMPRESSION: Normal pelvic ultrasound.  No sonographic evidence of ovarian torsion. I have personally reviewed the examination and initial interpretation and I agree with the findings. RICARDO BAXTER MD   SYSTEM ID:  [id number]    CT Abdomen Pelvis w/o Contrast    Result Date: 2024  EXAMINATION: CT ABDOMEN PELVIS W/O CONTRAST, 2024 12:57 PM INDICATION: hypotension, lower abd pain, jordan COMPARISON STUDY: None TECHNIQUE: CT scan of the abdomen and pelvis was performed on multidetector CT scanner using volumetric acquisition technique and images were reconstructed in multiple planes with variable thickness and reviewed on dedicated workstations. CONTRAST: Without contrast. CT scan radiation dose is optimized to minimum requisite dose using automated dose modulation techniques. FINDINGS: Lower thorax: Trace pleural effusions  with overlying subsegmental bibasilar atelectasis. Left infrahilar pulmonary cyst. Liver: Enlarged measuring 18.5 cm in craniocaudal dimension. No mass. No intrahepatic biliary ductal dilation. Biliary System: Status post cholecystectomy. No extrahepatic biliary ductal dilation. Pancreas: No pancreatic ductal dilation. Adrenal glands: No mass or nodules Spleen: Normal. Kidneys: 2 mm left renal parenchymal calculus. No obstructing calculus or hydronephrosis. Gastrointestinal tract: Normal caliber bowel.  Edematous colonic bowel wall thickening involving the cecum and ascending colon with adjacent fat stranding and small amount of free fluid. The transverse and descending colon appear unremarkable. Mesentery/peritoneum/retroperitoneum: No free fluid or air. Lymph nodes: No significant lymphadenopathy. Vasculature: Nonaneurysmal abdominal aorta. Pelvis: Urinary bladder is normal. Osseous structures: No aggressive or acute osseous lesion.   Soft tissues: Within normal limits.     IMPRESSION: Examination is limited due to lack of IV contrast. Within the limitations of the examination, findings are concerning for colitis with bowel wall thickening and adjacent inflammatory changes involving the cecum and ascending colon. Differential considerations include infectious, inflammatory or possible ischemic etiology given history of hypotension. I have personally reviewed the examination and initial interpretation and I agree with the findings. RICARDO BAXTER MD   SYSTEM ID:  [id number]    POC US ABDOMEN LIMITED    Bedside FAST (Focused Assessment with Sonography in Trauma), performed and interpreted by me. Indication: Abdominal pain and Hypotension With the patient in Trendelenburg, the RUQ, LUQ and subxiphoid views were examined for intraabdominal and thoracic free fluid and pericardial effusion. With the patient in reverse Trendelenburg, the suprapubic view was examined for intraabdominal free fluid. Image quality was  satisfactory.. Findings: There is no evidence of free fluid above or below bilateral diaphragms, in the splenorenal or hepatorenal space, or in bilateral paracolic gutters. There was no free fluid seen in the pelvis adjacent to the urinary bladder. There is no free fluid within the pericardium.    IMPRESSION:  Negative FAST

## 2024-06-05 NOTE — PLAN OF CARE
Goal Outcome Evaluation:  Plan of Care Reviewed With: patient  Overall Patient Progress: no change  Outcome Evaluation:     Assumed Cares 2900-2091: urine sample sent, abdominal CT completed, waiting on results, 1x additional oxycodone due to uncontrolled abdominal pain w/ gas discomfort, frequent loose BMs, voiding adequately, no appetite, difficulty drinking water due to abdominal pain, in bed most of shift but ambulating independently to bathroom. PRN simethicone, oxycodone, IV dilaudid given around the clock. PIV infusing LR @ 125mL/hr continuously. 1x 500mL LR bolus given due to hypotension. Pt bradycardic    Temp: 98.3  F (36.8  C)    BP: 98/61    Pulse: 51    Resp: 16    SpO2: 96 %    O2 Device: None (Room air)

## 2024-06-05 NOTE — PLAN OF CARE
Goal Outcome Evaluation:      Plan of Care Reviewed With: patient    Overall Patient Progress: no changeOverall Patient Progress: no change    Outcome Evaluation: Pt A/Ox4, VSS exc baseline hypotension (90's/50's, last value 95/56). Pt has LR infusing at 125mL/hr via L PIV. R PIV TKO inbetween IV merrem doses. Pt abx regiment changed to merrem this AM, CT suggestive of pylenephritis. CRP 69, LA 0.8. Pt voiding in toilet, strict I/O's. Diet advanced to reg. Pt needs active encouragement to eat and move around. Last BM yest. Hat in toilet to see appearance of next stool. C diff neg 6/4, enteric ISO removed. C/O abd and back pain despite use of PRN oxy and dilaudid. POC is to get pain and infection under control before discharge home.

## 2024-06-06 LAB
ACANTHOCYTES BLD QL SMEAR: NORMAL
ANION GAP SERPL CALCULATED.3IONS-SCNC: 9 MMOL/L (ref 7–15)
AUER BODIES BLD QL SMEAR: NORMAL
BASO STIPL BLD QL SMEAR: NORMAL
BASOPHILS # BLD AUTO: 0 10E3/UL (ref 0–0.2)
BASOPHILS NFR BLD AUTO: 0 %
BITE CELLS BLD QL SMEAR: NORMAL
BLISTER CELLS BLD QL SMEAR: NORMAL
BUN SERPL-MCNC: 6.7 MG/DL (ref 6–20)
BURR CELLS BLD QL SMEAR: NORMAL
CALCIUM SERPL-MCNC: 8.5 MG/DL (ref 8.6–10)
CHLORIDE SERPL-SCNC: 108 MMOL/L (ref 98–107)
CREAT SERPL-MCNC: 1.08 MG/DL (ref 0.51–0.95)
CRP SERPL-MCNC: 48 MG/L
DACRYOCYTES BLD QL SMEAR: NORMAL
DEPRECATED HCO3 PLAS-SCNC: 21 MMOL/L (ref 22–29)
EGFRCR SERPLBLD CKD-EPI 2021: 65 ML/MIN/1.73M2
ELLIPTOCYTES BLD QL SMEAR: NORMAL
EOSINOPHIL # BLD AUTO: 0.1 10E3/UL (ref 0–0.7)
EOSINOPHIL NFR BLD AUTO: 1 %
ERYTHROCYTE [DISTWIDTH] IN BLOOD BY AUTOMATED COUNT: 14.3 % (ref 10–15)
FRAGMENTS BLD QL SMEAR: NORMAL
GLUCOSE SERPL-MCNC: 144 MG/DL (ref 70–99)
HCT VFR BLD AUTO: 28.8 % (ref 35–47)
HGB BLD-MCNC: 9.7 G/DL (ref 11.7–15.7)
HGB C CRYSTALS: NORMAL
HOWELL-JOLLY BOD BLD QL SMEAR: NORMAL
IMM GRANULOCYTES # BLD: 0.5 10E3/UL
IMM GRANULOCYTES NFR BLD: 4 %
LYMPHOCYTES # BLD AUTO: 1.4 10E3/UL (ref 0.8–5.3)
LYMPHOCYTES NFR BLD AUTO: 13 %
MAGNESIUM SERPL-MCNC: 1.6 MG/DL (ref 1.7–2.3)
MCH RBC QN AUTO: 30 PG (ref 26.5–33)
MCHC RBC AUTO-ENTMCNC: 33.7 G/DL (ref 31.5–36.5)
MCV RBC AUTO: 89 FL (ref 78–100)
MONOCYTES # BLD AUTO: 1.9 10E3/UL (ref 0–1.3)
MONOCYTES NFR BLD AUTO: 17 %
NEUTROPHILS # BLD AUTO: 7 10E3/UL (ref 1.6–8.3)
NEUTROPHILS NFR BLD AUTO: 65 %
NEUTS HYPERSEG BLD QL SMEAR: NORMAL
NRBC # BLD AUTO: 0 10E3/UL
NRBC BLD AUTO-RTO: 0 /100
PLAT MORPH BLD: NORMAL
PLATELET # BLD AUTO: 211 10E3/UL (ref 150–450)
POLYCHROMASIA BLD QL SMEAR: NORMAL
POTASSIUM SERPL-SCNC: 3.2 MMOL/L (ref 3.4–5.3)
POTASSIUM SERPL-SCNC: 3.9 MMOL/L (ref 3.4–5.3)
RBC # BLD AUTO: 3.23 10E6/UL (ref 3.8–5.2)
RBC AGGLUT BLD QL: NORMAL
RBC MORPH BLD: NORMAL
ROULEAUX BLD QL SMEAR: NORMAL
SICKLE CELLS BLD QL SMEAR: NORMAL
SMUDGE CELLS BLD QL SMEAR: NORMAL
SODIUM SERPL-SCNC: 138 MMOL/L (ref 135–145)
SPHEROCYTES BLD QL SMEAR: NORMAL
STOMATOCYTES BLD QL SMEAR: NORMAL
TARGETS BLD QL SMEAR: NORMAL
TOXIC GRANULES BLD QL SMEAR: NORMAL
VARIANT LYMPHS BLD QL SMEAR: NORMAL
WBC # BLD AUTO: 10.8 10E3/UL (ref 4–11)

## 2024-06-06 PROCEDURE — 250N000013 HC RX MED GY IP 250 OP 250 PS 637

## 2024-06-06 PROCEDURE — 99233 SBSQ HOSP IP/OBS HIGH 50: CPT | Mod: 25

## 2024-06-06 PROCEDURE — 250N000013 HC RX MED GY IP 250 OP 250 PS 637: Performed by: STUDENT IN AN ORGANIZED HEALTH CARE EDUCATION/TRAINING PROGRAM

## 2024-06-06 PROCEDURE — 83735 ASSAY OF MAGNESIUM: CPT

## 2024-06-06 PROCEDURE — 85004 AUTOMATED DIFF WBC COUNT: CPT | Performed by: HOSPITALIST

## 2024-06-06 PROCEDURE — 250N000011 HC RX IP 250 OP 636

## 2024-06-06 PROCEDURE — 99222 1ST HOSP IP/OBS MODERATE 55: CPT | Performed by: PSYCHIATRY & NEUROLOGY

## 2024-06-06 PROCEDURE — 87086 URINE CULTURE/COLONY COUNT: CPT | Performed by: PHYSICIAN ASSISTANT

## 2024-06-06 PROCEDURE — 87040 BLOOD CULTURE FOR BACTERIA: CPT

## 2024-06-06 PROCEDURE — 36415 COLL VENOUS BLD VENIPUNCTURE: CPT

## 2024-06-06 PROCEDURE — 81001 URINALYSIS AUTO W/SCOPE: CPT | Performed by: PHYSICIAN ASSISTANT

## 2024-06-06 PROCEDURE — 84132 ASSAY OF SERUM POTASSIUM: CPT | Performed by: STUDENT IN AN ORGANIZED HEALTH CARE EDUCATION/TRAINING PROGRAM

## 2024-06-06 PROCEDURE — 76705 ECHO EXAM OF ABDOMEN: CPT | Mod: 26 | Performed by: INTERNAL MEDICINE

## 2024-06-06 PROCEDURE — 120N000002 HC R&B MED SURG/OB UMMC

## 2024-06-06 PROCEDURE — 36415 COLL VENOUS BLD VENIPUNCTURE: CPT | Performed by: STUDENT IN AN ORGANIZED HEALTH CARE EDUCATION/TRAINING PROGRAM

## 2024-06-06 PROCEDURE — 86140 C-REACTIVE PROTEIN: CPT

## 2024-06-06 PROCEDURE — 80048 BASIC METABOLIC PNL TOTAL CA: CPT | Performed by: HOSPITALIST

## 2024-06-06 PROCEDURE — 36415 COLL VENOUS BLD VENIPUNCTURE: CPT | Performed by: HOSPITALIST

## 2024-06-06 RX ORDER — BUPROPION HYDROCHLORIDE 150 MG/1
150 TABLET ORAL DAILY
Status: DISCONTINUED | OUTPATIENT
Start: 2024-06-07 | End: 2024-06-08 | Stop reason: HOSPADM

## 2024-06-06 RX ORDER — FLUMAZENIL 0.1 MG/ML
0.2 INJECTION, SOLUTION INTRAVENOUS DAILY PRN
Status: DISCONTINUED | OUTPATIENT
Start: 2024-06-06 | End: 2024-06-08 | Stop reason: HOSPADM

## 2024-06-06 RX ORDER — POTASSIUM CHLORIDE 750 MG/1
40 TABLET, EXTENDED RELEASE ORAL ONCE
Status: COMPLETED | OUTPATIENT
Start: 2024-06-06 | End: 2024-06-06

## 2024-06-06 RX ORDER — FLUMAZENIL 0.1 MG/ML
0.2 INJECTION, SOLUTION INTRAVENOUS ONCE
Status: DISCONTINUED | OUTPATIENT
Start: 2024-06-06 | End: 2024-06-06

## 2024-06-06 RX ORDER — POLYETHYLENE GLYCOL 3350 17 G
2 POWDER IN PACKET (EA) ORAL
Status: DISCONTINUED | OUTPATIENT
Start: 2024-06-06 | End: 2024-06-08 | Stop reason: HOSPADM

## 2024-06-06 RX ORDER — HYDROMORPHONE HCL IN WATER/PF 6 MG/30 ML
.2-.4 PATIENT CONTROLLED ANALGESIA SYRINGE INTRAVENOUS EVERY 8 HOURS PRN
Status: DISCONTINUED | OUTPATIENT
Start: 2024-06-06 | End: 2024-06-07

## 2024-06-06 RX ORDER — LORAZEPAM 1 MG/1
2 TABLET ORAL
Status: DISCONTINUED | OUTPATIENT
Start: 2024-06-06 | End: 2024-06-07

## 2024-06-06 RX ORDER — IBUPROFEN 200 MG
200 TABLET ORAL EVERY 6 HOURS PRN
Status: DISCONTINUED | OUTPATIENT
Start: 2024-06-06 | End: 2024-06-08 | Stop reason: HOSPADM

## 2024-06-06 RX ORDER — MIRTAZAPINE 15 MG/1
30 TABLET, FILM COATED ORAL AT BEDTIME
Status: DISCONTINUED | OUTPATIENT
Start: 2024-06-06 | End: 2024-06-08 | Stop reason: HOSPADM

## 2024-06-06 RX ORDER — LORAZEPAM 1 MG/1
2 TABLET ORAL 2 TIMES DAILY
Status: DISCONTINUED | OUTPATIENT
Start: 2024-06-06 | End: 2024-06-07

## 2024-06-06 RX ORDER — NICOTINE 21 MG/24HR
1 PATCH, TRANSDERMAL 24 HOURS TRANSDERMAL DAILY
Status: DISCONTINUED | OUTPATIENT
Start: 2024-06-06 | End: 2024-06-08 | Stop reason: HOSPADM

## 2024-06-06 RX ADMIN — HYDROMORPHONE HYDROCHLORIDE 1 MG: 2 TABLET ORAL at 19:45

## 2024-06-06 RX ADMIN — OXYCODONE HYDROCHLORIDE 5 MG: 5 TABLET ORAL at 05:53

## 2024-06-06 RX ADMIN — HYDROMORPHONE HYDROCHLORIDE 1 MG: 2 TABLET ORAL at 14:03

## 2024-06-06 RX ADMIN — HYDROMORPHONE HYDROCHLORIDE 0.4 MG: 0.2 INJECTION, SOLUTION INTRAMUSCULAR; INTRAVENOUS; SUBCUTANEOUS at 00:36

## 2024-06-06 RX ADMIN — GLYCERIN, PETROLATUM, PHENYLEPHRINE HCL, PRAMOXINE HCL: 144; 2.5; 10; 15 CREAM TOPICAL at 14:06

## 2024-06-06 RX ADMIN — LORAZEPAM 2 MG: 2 INJECTION INTRAMUSCULAR; INTRAVENOUS at 03:48

## 2024-06-06 RX ADMIN — LORAZEPAM 2 MG: 1 TABLET ORAL at 12:07

## 2024-06-06 RX ADMIN — LIDOCAINE 4% 1 PATCH: 40 PATCH TOPICAL at 22:08

## 2024-06-06 RX ADMIN — ACETAMINOPHEN 975 MG: 325 TABLET, FILM COATED ORAL at 04:03

## 2024-06-06 RX ADMIN — LORAZEPAM 2 MG: 1 TABLET ORAL at 19:47

## 2024-06-06 RX ADMIN — DICYCLOMINE HYDROCHLORIDE 10 MG: 10 CAPSULE ORAL at 20:01

## 2024-06-06 RX ADMIN — POTASSIUM CHLORIDE 40 MEQ: 750 TABLET, EXTENDED RELEASE ORAL at 11:22

## 2024-06-06 RX ADMIN — DICYCLOMINE HYDROCHLORIDE 10 MG: 10 CAPSULE ORAL at 12:07

## 2024-06-06 RX ADMIN — METRONIDAZOLE 500 MG: 500 INJECTION, SOLUTION INTRAVENOUS at 09:16

## 2024-06-06 RX ADMIN — HYDROMORPHONE HYDROCHLORIDE 0.4 MG: 0.2 INJECTION, SOLUTION INTRAMUSCULAR; INTRAVENOUS; SUBCUTANEOUS at 18:25

## 2024-06-06 RX ADMIN — POLYETHYLENE GLYCOL 3350 17 G: 17 POWDER, FOR SOLUTION ORAL at 08:21

## 2024-06-06 RX ADMIN — LORAZEPAM 2 MG: 1 TABLET ORAL at 23:41

## 2024-06-06 RX ADMIN — GLYCERIN, PETROLATUM, PHENYLEPHRINE HCL, PRAMOXINE HCL: 144; 2.5; 10; 15 CREAM TOPICAL at 09:16

## 2024-06-06 RX ADMIN — METRONIDAZOLE 500 MG: 500 INJECTION, SOLUTION INTRAVENOUS at 22:13

## 2024-06-06 RX ADMIN — DICYCLOMINE HYDROCHLORIDE 10 MG: 10 CAPSULE ORAL at 16:59

## 2024-06-06 RX ADMIN — HYDROMORPHONE HYDROCHLORIDE 0.2 MG: 0.2 INJECTION, SOLUTION INTRAMUSCULAR; INTRAVENOUS; SUBCUTANEOUS at 08:20

## 2024-06-06 RX ADMIN — LEVOFLOXACIN 750 MG: 5 INJECTION, SOLUTION INTRAVENOUS at 20:01

## 2024-06-06 RX ADMIN — SIMETHICONE 80 MG: 80 TABLET, CHEWABLE ORAL at 00:36

## 2024-06-06 RX ADMIN — GLYCERIN, PETROLATUM, PHENYLEPHRINE HCL, PRAMOXINE HCL: 144; 2.5; 10; 15 CREAM TOPICAL at 22:20

## 2024-06-06 ASSESSMENT — ACTIVITIES OF DAILY LIVING (ADL)
ADLS_ACUITY_SCORE: 22
ADLS_ACUITY_SCORE: 23
ADLS_ACUITY_SCORE: 22
ADLS_ACUITY_SCORE: 23

## 2024-06-06 NOTE — PLAN OF CARE
Goal Outcome Evaluation:  Plan of Care Reviewed With: patient  Overall Patient Progress: no change  Outcome Evaluation:     Assumed Cares 3857-8628: Possible paracentesis and thoracentesis tomorrow and psych consult. Plan to switch to oral antibx tomorrow and potential discharge if stable. Pt anxious to discharge due to family situation at home. VSS on RA except bradycardia and hypotension. Pain uncontrolled w/ PRN IV dilaudid q6hrs, simethicone and bentyl.    VSS on RA.     Temp: 99.5  F (37.5  C)    BP: 95/51    Pulse: 62    Resp: 18    SpO2: 94 %    O2 Device: None (Room air)
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
## 2024-06-06 NOTE — CONSULTS
RNCC acknowledging IP CM/SW Consult order; CM team already consulted and following; Subsequent referral sent to Financial Counseling, ongoing support r/t obtaining/re-establishing health insurance. RNCC to continue to follow and support safe discharge planning.       Guanako Cristina RN BSN  7C RNCC  Phone: [phone]  Pager: [phone]    For Weekend & Holiday on call RN Care Coordinator:  (Tasks: Home care, home infusion, medical equipment, transportation, IMM & MARROQUIN forms, etc.)  [place] (0800 - 1630) Saturday and Sunday    Units: 5A, 5B, & 5C: [phone]    Units: 6B, 6C, 6D: [phone]    Units: 7A, 7B, 7C, 7D: [phone]    Units: 6A/ICU : [phone]    [facility] [place] ([id number]) Saturday and Sunday    Units: 6 Med/Surg, 8A, 10 ICU, & Pediatric Units: [phone]    Units: 5 Ortho, 5Med/Surg & WB ED: [phone]

## 2024-06-06 NOTE — CONSULTS
[facility]  CAPS NOTE  Date of Admission:  6/1/2024  Consult Requested by: Medicine  Reason for Consult: diagnostic evaluation of ascites    POC US Guide for Paracentesis     Impression  US Indication: abdominal distension    Limited abdominal ultrasound was performed to evaluate for ascites and need for paracentesis.    Views/Acquisition: hepatorenal/RUQ, right lower quadrant, parasplenic/LUQ, and left lower quadrant    Findings/Interpretation: No significant ascites    Estelita Mcgregor MD    Assessment and Plan:  Requested procedure was not performed.  Insufficient fluid for safe bedside paracentesis.    Estelita Mcgregor MD  [facility]  Securely message with [employer] (more info)  Text page via [facility] Paging/Directory   See signed in provider for up to date coverage information

## 2024-06-06 NOTE — PROGRESS NOTES
[facility]    Medicine Progress Note - Hospitalist Service, GOLD TEAM 5    Date of Admission:  6/1/2024    Assessment & Plan   Alisha Crowe is a 43 year old female with a PMH significant for MDD, ADHD, PTSD, chronic pelvic pain (previously on chronic oxycodone), constipation, previous episodes of colitis who was admitted on 06/01/2024 from urgent care with abdominal pain and hypotension. Initially admitted to the MICU for septic shock and found to have e coli bacteremia and colitis. CRS and GI consulted.  Initially required pressors, but weaned off 06/02 and transferred to Medicine service on 06/03.      # Septic shock 2/2 E. coli bacteremia,2/2   # E. coli UTI, right sided pyelonephritis   Initially admitted to MICU to pressors, but was weaned off pressors 06/02 AM. Suspect bacteremia in the setting of acute colitis and bowel translocation, but cannot rule out UTI as well given positive UCx for E.coli. Allergies listed for ceftriaxone (rash) and ciprofloxacin (headache). TTE without EF 60-65% and no wall motion abnormality. No significant valvular abnormality. WBC trending down, but ongoing abdominal pain with softer BPs for now-see below. CT A/P below showing right sided hydronephrosis with some foci of focal nephronia concerning for right sided pyelo. Given E Coli was resistant to Zosyn on urine culture (but not blood culture), switched to Meropenum on 6/5. Given we are looking for an PO option, switched to levofloxacin and metronidazole. Discussed with pt and willing to try (headaches with Cipro). Meropenum started but stopped on 6/5 given switch to Levofloxacin and metronidazole as below. Low grade fever to 100.5 F overnight, but BCx with NGTD, HDS.   - Continue Levofloxacin 750 mg IV daily, metronidazole 500 mg q12hr for 10 day course from last negative Blood culture (6/3-6/13)--> switch to PO tomorrow if remains HDS  - Trend CBC daily   - Follow blood cultures,  NGTD since 6/3/24  - Stop mIVF, gentle bolus as needed   - Monitor   - OP PCP referral     # Acute on chronic abdominal pain, ongoing    # Right colonic bowel thickening on CT non-contrast, resolved on repeat CT   # Concern for transient low flow state from hypotension   # Chronic pelvic pain   # Chronic constipation, resolved   # Diarrhea, resolved   # Suspected pelvic floor dysfunction   # Suspected hemorrhoids   # History of unspecified colitis  Initially presented with abdominal pain for ~2 weeks to urgent care.  Per chart review had episodes of colitis in the past in 2017 where patient underwent flex sig which was unremarkable at that time. No stool samples, but bacteremia with BCx with E.coli as above.- STD testing IP (HIV, Hep B/C, Chlamydia, Gonorrheae, Treponema- all negative) US pelvis on admission with no acute findings. CT Ab/Pelvis without contrast with colitis of cecum and ascending colon. No evidence of mesenteric ischemic on US doppler. Fat stranding and bowel thickening on initial CT do not correlate with rationale for initial critical illness as above. Repeat CT A/P with contrast undertaken on 6/5 given worsening abdominal pain, hypotension (likely due to medications).  See discussion above, likely cause of pain and sepsis due to E coli UTI based on findings on 6/5 CT, negative enteric panel, resolving bowel wall thickening. Suspect ongoing abdominal pain is also from vaginal infection (+ bacterial vaginosis, positive candida on vaginal swab), urinary tract infection with pyelonephritis, transient low flow state from hypotension, mental health component given history of depression/PTSD, chronic pelvic pain, previously constipation now seems to have resolved.  - GI and Colorectal Surgery consulted, now signed off  - CAPs consult to discuss need for para in AM   - Mental health recs as below   - Consider pain consult if ongoing pain despite treatment for infection, PRNs below   - Trend CBC daily   -  Mirlax BID, titrate up to TID per GI recs for 3 BMs weekly   - Bentryl 10 mg QID  - PRN Tucks, schedule prep H  - PRN simethicone, dicyclomine   - Will need GI and Colonoscopy OP, GI to arrange   - Pelvic floor PT OP consult     # Hypotension, resolved   Likely due to medications and loose stool given normalizing WBC, lnormal actic on 6/5. Improving with gentle fluid boluses and hydration   - Watch opiate/benzo dosing   - Recommend small mIVF bolus PRN    # MDD  # ADHD  # PTSD  Was previously on bupropion and mirtazapine, but no longer taking due to finances. PTA ativan 2mg TID PRN.   - Psychiatry and Health Psychology given labile mood, chronic pain  - Restart Ativan; schedule 2 mg BID, add 2 mg PRN at bedtime for tonight, if not too drowsey can schedule tomorrow  -Nicotine patch 14-21 mg (smokes 1/2 PPD)  - Restart Remeron 30 mg   - Defer prazosin or clonidine as BP is low  - Start Wellbutrin (bupropion)  mg in the morning, increase to 300 mg in a week as tolerated.  - PCP and OP mental health referrals at discharge    # Bacterial vulvovaginitis   - Abx as above    # Candida vulvovaginitis   - Fluconazole 150 mg x1, repeat after abx course x1 dose at discharge      # YOLANDA, improving   See 2.61 on admission, improving to 1.74. Likely in the setting of septic shock and UTI as above.   - Trend BMP daily   - Treat infections as above   - Encourage PO intake     Creatinine   Date Value Ref Range Status   06/06/2024 1.08 (H) 0.51 - 0.95 mg/dL Final   06/05/2024 1.28 (H) 0.51 - 0.95 mg/dL Final   06/04/2024 1.28 (H) 0.51 - 0.95 mg/dL Final   06/04/2024 1.44 (H) 0.51 - 0.95 mg/dL Final   06/03/2024 1.74 (H) 0.51 - 0.95 mg/dL Final       # Focal renal hypodensities, suspect possible cyst with hemorrhagic or proteinases contents   Seen on CT A/P Hgb relatively stable as below.   - Ultrasound follow-up OP unless clinically decompensates.   - Trend WBC, Hgb   - Treat infection as above    # B/L pleural effusions  Seen on  "CT A/P. On RA. No shortness of breath. No cough, ongoing fevers, broad coverage throughout stay, so less likely ID and more likely due to fluid overload.  - Discuss with CAPs team, not enough fluid to tap  - Stop unneeded mIVF  - IS, ambulation     # Normocytic anemia  No signs or symptoms of acute bleeding. Possible dilutional component with recent boluses in the setting of shock.   -Trend CBC daily      # Thrombocytopenia, mild   No signs or symptoms of acute bleeding. Possible dilutional component with recent boluses in the setting of shock.   -Trend CBC daily     # Hypokalemia  # Hypomagnesemia  Likely due to loose stool.  - RN replacement protocols     #Asthma: without acute exacerbation      Resolved:   # Hyponatremia, mild, resolved           Diet: Regular Diet Adult    DVT Prophylaxis: Heparin SQ  Eli Catheter: Not present  Lines: None     Cardiac Monitoring: None  Code Status: Full Code      Clinically Significant Risk Factors        # Hypokalemia: Lowest K = 3.2 mmol/L in last 2 days, will replace as needed       # Hypoalbuminemia: Lowest albumin = 2.3 g/dL at 6/4/2024  2:11 PM, will monitor as appropriate                  # Obesity: Estimated body mass index is 33.68 kg/m  as calculated from the following:    Height as of this encounter: 1.473 m (4' 10\").    Weight as of this encounter: 73.1 kg (161 lb 2.5 oz).             Disposition Plan     Medically Ready for Discharge: Anticipated in 2-4 Days       The patient's care was discussed with the Attending Physician, Dr. Hopkins, Bedside Nurse, Patient, and patient's mother.     BILLIE August [facility]  Hospitalist Service, [address]  Securely message with [employer] (more info)  Text page via [facility] Paging/Directory   See signed in provider for up to date coverage information    This chart documentation was completed with Dragon voice-recognition software. Even though reviewed, this chart may still " contain some grammatical, spelling, and word errors. Please contact the author for any questions or clarifications.     ______________________________________________________________________    Interval History   Nursing/SW/consult/interdisciplinary healthcare worker's notes reviewed.     I reviewed all medications, new labs and imaging results over the last 24 hours. Please see discussion of these results in the A/P.    Spiked fever overnight to 100.5F. BP remains stable WBC remains normal. Cr downtrending. Pain 4-7 overnight. Using PRN oxy/dilaudid and ativan overnight. Feeling slightly better, denies back pain, but some abdominal pain. Noting she had several bowel movements yesterday. Stating she stopped taking her Psych meds due to not being able to afford it.    ROS: 12 point ROS negative other than the symptoms noted here or above in the assessment and plan.     Physical Exam   Vital Signs: Temp: (!) 100.5  F (38.1  C) (Tylenol 975 mg, MD notified) Temp src: Oral BP: 95/61 Pulse: 65   Resp: 18 SpO2: 93 % O2 Device: None (Room air)    Weight: 161 lbs 2.5 oz    General Appearance:  In mild acute distress in bed, clutching abdomen   Respiratory: LS clear b/l, normal RR  Cardiovascular: S1, S2, no m/r/g  GI: BS+, all 4 quadrants, no masses, +B/L flank tenderness   Skin: Intact on face, arms, legs. No wounds, bruising, or lesions noted. Dirty fingernails   Neuropsych:A&Ox4, moving all extremities, labile moods     Medical Decision Making       65 MINUTES SPENT BY ME on the date of service doing chart review, history, exam, documentation & further activities per the note.      Data     I have personally reviewed the following data over the past 24 hrs:    10.8  \   9.7 (L)   / 211     138 108 (H) 6.7 /  144 (H)   3.2 (L) 21 (L) 1.08 (H) \     ALT: N/A AST: N/A AP: N/A TBILI: N/A   ALB: N/A TOT PROTEIN: 5.1 (L) LIPASE: N/A     Procal: N/A CRP: N/A Lactic Acid: N/A       INR:  1.07 PTT:  28   D-dimer:  N/A Fibrinogen:   N/A     Ferritin:  N/A % Retic:  N/A LDH:  129       Imaging results reviewed over the past 24 hrs:   No results found for this or any previous visit (from the past 24 hour(s)).

## 2024-06-06 NOTE — PROVIDER NOTIFICATION
Jasper De Anda notified pt's Temp at 0634 was 100.5, Tylenol 975 mg given. Provider requested if to collect blood cultures.     Provider requested to monitor temp, wants to hold off on getting blood cultures.

## 2024-06-07 ENCOUNTER — APPOINTMENT (OUTPATIENT)
Dept: GENERAL RADIOLOGY | Facility: CLINIC | Age: 44
DRG: 871 | End: 2024-06-07

## 2024-06-07 ENCOUNTER — APPOINTMENT (OUTPATIENT)
Dept: OCCUPATIONAL THERAPY | Facility: CLINIC | Age: 44
DRG: 871 | End: 2024-06-07

## 2024-06-07 LAB
ALBUMIN UR-MCNC: NEGATIVE MG/DL
ANION GAP SERPL CALCULATED.3IONS-SCNC: 7 MMOL/L (ref 7–15)
APPEARANCE UR: CLEAR
BASOPHILS # BLD AUTO: ABNORMAL 10*3/UL
BASOPHILS # BLD MANUAL: 0.1 10E3/UL (ref 0–0.2)
BASOPHILS NFR BLD AUTO: ABNORMAL %
BASOPHILS NFR BLD MANUAL: 1 %
BILIRUB UR QL STRIP: NEGATIVE
BUN SERPL-MCNC: 6.6 MG/DL (ref 6–20)
CALCIUM SERPL-MCNC: 8.9 MG/DL (ref 8.6–10)
CHLORIDE SERPL-SCNC: 110 MMOL/L (ref 98–107)
COLOR UR AUTO: ABNORMAL
CREAT SERPL-MCNC: 1.05 MG/DL (ref 0.51–0.95)
CRP SERPL-MCNC: 36.5 MG/L
DEPRECATED HCO3 PLAS-SCNC: 23 MMOL/L (ref 22–29)
EGFRCR SERPLBLD CKD-EPI 2021: 68 ML/MIN/1.73M2
EOSINOPHIL # BLD AUTO: ABNORMAL 10*3/UL
EOSINOPHIL # BLD MANUAL: 0.1 10E3/UL (ref 0–0.7)
EOSINOPHIL NFR BLD AUTO: ABNORMAL %
EOSINOPHIL NFR BLD MANUAL: 1 %
ERYTHROCYTE [DISTWIDTH] IN BLOOD BY AUTOMATED COUNT: 14.3 % (ref 10–15)
GLUCOSE SERPL-MCNC: 96 MG/DL (ref 70–99)
GLUCOSE UR STRIP-MCNC: NEGATIVE MG/DL
HCT VFR BLD AUTO: 28 % (ref 35–47)
HGB BLD-MCNC: 9.4 G/DL (ref 11.7–15.7)
HGB UR QL STRIP: ABNORMAL
IMM GRANULOCYTES # BLD: ABNORMAL 10*3/UL
IMM GRANULOCYTES NFR BLD: ABNORMAL %
KETONES UR STRIP-MCNC: NEGATIVE MG/DL
LEUKOCYTE ESTERASE UR QL STRIP: ABNORMAL
LYMPHOCYTES # BLD AUTO: ABNORMAL 10*3/UL
LYMPHOCYTES # BLD MANUAL: 1.8 10E3/UL (ref 0.8–5.3)
LYMPHOCYTES NFR BLD AUTO: ABNORMAL %
LYMPHOCYTES NFR BLD MANUAL: 16 %
MAGNESIUM SERPL-MCNC: 1.6 MG/DL (ref 1.7–2.3)
MCH RBC QN AUTO: 30.2 PG (ref 26.5–33)
MCHC RBC AUTO-ENTMCNC: 33.6 G/DL (ref 31.5–36.5)
MCV RBC AUTO: 90 FL (ref 78–100)
MONOCYTES # BLD AUTO: ABNORMAL 10*3/UL
MONOCYTES # BLD MANUAL: 1.5 10E3/UL (ref 0–1.3)
MONOCYTES NFR BLD AUTO: ABNORMAL %
MONOCYTES NFR BLD MANUAL: 13 %
MUCOUS THREADS #/AREA URNS LPF: PRESENT /LPF
NEUTROPHILS # BLD AUTO: ABNORMAL 10*3/UL
NEUTROPHILS # BLD MANUAL: 7.9 10E3/UL (ref 1.6–8.3)
NEUTROPHILS NFR BLD AUTO: ABNORMAL %
NEUTROPHILS NFR BLD MANUAL: 69 %
NITRATE UR QL: NEGATIVE
NRBC # BLD AUTO: 0 10E3/UL
NRBC BLD AUTO-RTO: 0 /100
PH UR STRIP: 7.5 [PH] (ref 5–7)
PLAT MORPH BLD: ABNORMAL
PLATELET # BLD AUTO: 277 10E3/UL (ref 150–450)
POTASSIUM SERPL-SCNC: 3.7 MMOL/L (ref 3.4–5.3)
PROCALCITONIN SERPL IA-MCNC: 1.07 NG/ML
RBC # BLD AUTO: 3.11 10E6/UL (ref 3.8–5.2)
RBC MORPH BLD: ABNORMAL
RBC URINE: 4 /HPF
SODIUM SERPL-SCNC: 140 MMOL/L (ref 135–145)
SP GR UR STRIP: 1.01 (ref 1–1.03)
SQUAMOUS EPITHELIAL: 2 /HPF
TRANSITIONAL EPI: <1 /HPF
UROBILINOGEN UR STRIP-MCNC: NORMAL MG/DL
VARIANT LYMPHS BLD QL SMEAR: PRESENT
WBC # BLD AUTO: 11.4 10E3/UL (ref 4–11)
WBC URINE: 18 /HPF

## 2024-06-07 PROCEDURE — 85027 COMPLETE CBC AUTOMATED: CPT | Performed by: HOSPITALIST

## 2024-06-07 PROCEDURE — 120N000002 HC R&B MED SURG/OB UMMC

## 2024-06-07 PROCEDURE — 82374 ASSAY BLOOD CARBON DIOXIDE: CPT | Performed by: HOSPITALIST

## 2024-06-07 PROCEDURE — 71046 X-RAY EXAM CHEST 2 VIEWS: CPT

## 2024-06-07 PROCEDURE — 36415 COLL VENOUS BLD VENIPUNCTURE: CPT | Performed by: HOSPITALIST

## 2024-06-07 PROCEDURE — 250N000013 HC RX MED GY IP 250 OP 250 PS 637

## 2024-06-07 PROCEDURE — 99232 SBSQ HOSP IP/OBS MODERATE 35: CPT | Mod: FS | Performed by: STUDENT IN AN ORGANIZED HEALTH CARE EDUCATION/TRAINING PROGRAM

## 2024-06-07 PROCEDURE — 85007 BL SMEAR W/DIFF WBC COUNT: CPT | Performed by: HOSPITALIST

## 2024-06-07 PROCEDURE — 74019 RADEX ABDOMEN 2 VIEWS: CPT

## 2024-06-07 PROCEDURE — 71046 X-RAY EXAM CHEST 2 VIEWS: CPT | Mod: 26 | Performed by: RADIOLOGY

## 2024-06-07 PROCEDURE — 84145 PROCALCITONIN (PCT): CPT

## 2024-06-07 PROCEDURE — 99207 PR APP CREDIT; MD BILLING SHARED VISIT: CPT | Mod: FS

## 2024-06-07 PROCEDURE — 74019 RADEX ABDOMEN 2 VIEWS: CPT | Mod: 26 | Performed by: STUDENT IN AN ORGANIZED HEALTH CARE EDUCATION/TRAINING PROGRAM

## 2024-06-07 PROCEDURE — 97530 THERAPEUTIC ACTIVITIES: CPT | Mod: GO | Performed by: OCCUPATIONAL THERAPIST

## 2024-06-07 PROCEDURE — 250N000011 HC RX IP 250 OP 636: Mod: JZ

## 2024-06-07 PROCEDURE — 83735 ASSAY OF MAGNESIUM: CPT | Performed by: STUDENT IN AN ORGANIZED HEALTH CARE EDUCATION/TRAINING PROGRAM

## 2024-06-07 PROCEDURE — 99254 IP/OBS CNSLTJ NEW/EST MOD 60: CPT | Mod: FS | Performed by: PHYSICIAN ASSISTANT

## 2024-06-07 PROCEDURE — 86140 C-REACTIVE PROTEIN: CPT | Performed by: STUDENT IN AN ORGANIZED HEALTH CARE EDUCATION/TRAINING PROGRAM

## 2024-06-07 PROCEDURE — 82565 ASSAY OF CREATININE: CPT | Performed by: HOSPITALIST

## 2024-06-07 PROCEDURE — 250N000013 HC RX MED GY IP 250 OP 250 PS 637: Performed by: PHYSICIAN ASSISTANT

## 2024-06-07 RX ORDER — LORAZEPAM 1 MG/1
2 TABLET ORAL 2 TIMES DAILY
Status: DISCONTINUED | OUTPATIENT
Start: 2024-06-07 | End: 2024-06-08 | Stop reason: HOSPADM

## 2024-06-07 RX ORDER — ACETAMINOPHEN 325 MG/1
975 TABLET ORAL EVERY 8 HOURS PRN
COMMUNITY
Start: 2024-06-07 | End: 2024-06-28

## 2024-06-07 RX ORDER — IBUPROFEN 200 MG
200-400 TABLET ORAL EVERY 6 HOURS PRN
COMMUNITY
Start: 2024-06-07

## 2024-06-07 RX ORDER — MIRTAZAPINE 30 MG/1
30 TABLET, FILM COATED ORAL AT BEDTIME
Qty: 45 TABLET | Refills: 0 | Status: SHIPPED | OUTPATIENT
Start: 2024-06-07 | End: 2024-07-22

## 2024-06-07 RX ORDER — LEVOFLOXACIN 750 MG/1
750 TABLET, FILM COATED ORAL EVERY 24 HOURS
Qty: 11 TABLET | Refills: 0 | Status: DISCONTINUED | OUTPATIENT
Start: 2024-06-07 | End: 2024-06-08 | Stop reason: HOSPADM

## 2024-06-07 RX ORDER — SIMETHICONE 80 MG
80 TABLET,CHEWABLE ORAL EVERY 6 HOURS PRN
COMMUNITY
Start: 2024-06-07

## 2024-06-07 RX ORDER — HYDROMORPHONE HYDROCHLORIDE 2 MG/1
2-4 TABLET ORAL EVERY 8 HOURS PRN
Qty: 18 TABLET | Refills: 0 | Status: SHIPPED | OUTPATIENT
Start: 2024-06-07 | End: 2024-06-10

## 2024-06-07 RX ORDER — FLUCONAZOLE 150 MG/1
150 TABLET ORAL ONCE
Qty: 1 TABLET | Refills: 0 | Status: SHIPPED | OUTPATIENT
Start: 2024-06-20 | End: 2024-06-20

## 2024-06-07 RX ORDER — POLYETHYLENE GLYCOL 3350 17 G/17G
17 POWDER, FOR SOLUTION ORAL DAILY
COMMUNITY
Start: 2024-06-07 | End: 2024-06-08

## 2024-06-07 RX ORDER — LORAZEPAM 1 MG/1
2 TABLET ORAL AT BEDTIME
Status: DISCONTINUED | OUTPATIENT
Start: 2024-06-07 | End: 2024-06-08 | Stop reason: HOSPADM

## 2024-06-07 RX ORDER — ALBUTEROL SULFATE 90 UG/1
2 AEROSOL, METERED RESPIRATORY (INHALATION) EVERY 6 HOURS PRN
Qty: 18 G | Refills: 0 | Status: SHIPPED | OUTPATIENT
Start: 2024-06-07

## 2024-06-07 RX ORDER — HYDROMORPHONE HYDROCHLORIDE 2 MG/1
2-4 TABLET ORAL EVERY 6 HOURS PRN
Status: DISCONTINUED | OUTPATIENT
Start: 2024-06-07 | End: 2024-06-08 | Stop reason: HOSPADM

## 2024-06-07 RX ORDER — DICYCLOMINE HYDROCHLORIDE 10 MG/1
10 CAPSULE ORAL 4 TIMES DAILY
Qty: 180 CAPSULE | Refills: 0 | Status: SHIPPED | OUTPATIENT
Start: 2024-06-07 | End: 2024-07-22

## 2024-06-07 RX ORDER — HYDROMORPHONE HCL IN WATER/PF 6 MG/30 ML
.2-.4 PATIENT CONTROLLED ANALGESIA SYRINGE INTRAVENOUS EVERY 12 HOURS PRN
Status: DISCONTINUED | OUTPATIENT
Start: 2024-06-07 | End: 2024-06-08 | Stop reason: HOSPADM

## 2024-06-07 RX ORDER — BUPROPION HYDROCHLORIDE 150 MG/1
150 TABLET ORAL DAILY
Qty: 45 TABLET | Refills: 0 | Status: SHIPPED | OUTPATIENT
Start: 2024-06-08 | End: 2024-07-23

## 2024-06-07 RX ORDER — MEROPENEM 1 G/1
1 INJECTION, POWDER, FOR SOLUTION INTRAVENOUS EVERY 8 HOURS
Status: DISCONTINUED | OUTPATIENT
Start: 2024-06-07 | End: 2024-06-07

## 2024-06-07 RX ORDER — METRONIDAZOLE 500 MG/1
500 TABLET ORAL 2 TIMES DAILY
Qty: 8 TABLET | Refills: 0 | Status: SHIPPED | OUTPATIENT
Start: 2024-06-08 | End: 2024-06-12

## 2024-06-07 RX ORDER — POLYETHYLENE GLYCOL 3350 17 G/17G
17 POWDER, FOR SOLUTION ORAL 2 TIMES DAILY
COMMUNITY
Start: 2024-06-07 | End: 2024-06-07

## 2024-06-07 RX ORDER — METRONIDAZOLE 500 MG/1
500 TABLET ORAL 2 TIMES DAILY
Status: DISCONTINUED | OUTPATIENT
Start: 2024-06-07 | End: 2024-06-08 | Stop reason: HOSPADM

## 2024-06-07 RX ORDER — LORAZEPAM 2 MG/1
2 TABLET ORAL 3 TIMES DAILY PRN
Qty: 60 TABLET | Refills: 0 | Status: SHIPPED | OUTPATIENT
Start: 2024-06-07 | End: 2029-12-28

## 2024-06-07 RX ORDER — LEVOFLOXACIN 750 MG/1
750 TABLET, FILM COATED ORAL EVERY 24 HOURS
Qty: 11 TABLET | Refills: 0 | Status: SHIPPED | OUTPATIENT
Start: 2024-06-08 | End: 2024-06-19

## 2024-06-07 RX ADMIN — METRONIDAZOLE 500 MG: 500 TABLET ORAL at 20:10

## 2024-06-07 RX ADMIN — LORAZEPAM 2 MG: 1 TABLET ORAL at 17:47

## 2024-06-07 RX ADMIN — HYDROMORPHONE HYDROCHLORIDE 2 MG: 2 TABLET ORAL at 08:50

## 2024-06-07 RX ADMIN — LORAZEPAM 2 MG: 1 TABLET ORAL at 10:19

## 2024-06-07 RX ADMIN — HYDROMORPHONE HYDROCHLORIDE 0.4 MG: 0.2 INJECTION, SOLUTION INTRAMUSCULAR; INTRAVENOUS; SUBCUTANEOUS at 14:15

## 2024-06-07 RX ADMIN — HYDROMORPHONE HYDROCHLORIDE 4 MG: 2 TABLET ORAL at 20:10

## 2024-06-07 RX ADMIN — LEVOFLOXACIN 750 MG: 750 TABLET, FILM COATED ORAL at 20:11

## 2024-06-07 RX ADMIN — HYDROMORPHONE HYDROCHLORIDE 0.4 MG: 0.2 INJECTION, SOLUTION INTRAMUSCULAR; INTRAVENOUS; SUBCUTANEOUS at 02:18

## 2024-06-07 RX ADMIN — MEROPENEM 1 G: 1 INJECTION, POWDER, FOR SOLUTION INTRAVENOUS at 09:01

## 2024-06-07 RX ADMIN — IBUPROFEN 200 MG: 200 TABLET, FILM COATED ORAL at 01:28

## 2024-06-07 RX ADMIN — MIRTAZAPINE 30 MG: 15 TABLET, FILM COATED ORAL at 20:10

## 2024-06-07 RX ADMIN — LORAZEPAM 2 MG: 1 TABLET ORAL at 21:46

## 2024-06-07 ASSESSMENT — ACTIVITIES OF DAILY LIVING (ADL)
ADLS_ACUITY_SCORE: 22
ADLS_ACUITY_SCORE: 23
ADLS_ACUITY_SCORE: 22
ADLS_ACUITY_SCORE: 23
ADLS_ACUITY_SCORE: 22
ADLS_ACUITY_SCORE: 25
ADLS_ACUITY_SCORE: 23
ADLS_ACUITY_SCORE: 22
ADLS_ACUITY_SCORE: 23
ADLS_ACUITY_SCORE: 22

## 2024-06-07 NOTE — PROGRESS NOTES
[facility]    Medicine Progress Note - Hospitalist Service, GOLD TEAM 5    Date of Admission:  6/1/2024    Assessment & Plan   Alisha Crowe is a 43 year old female with a PMH significant for MDD, ADHD, PTSD, chronic pelvic pain (previously on chronic oxycodone), constipation, previous episodes of colitis who was admitted on 06/01/2024 from urgent care with abdominal pain and hypotension. Initially admitted to the MICU for septic shock and found to have e coli bacteremia and colitis. CRS and GI consulted.  Initially required pressors, but weaned off 06/02 and transferred to Medicine service on 06/03.      # Septic shock 2/2 E. coli bacteremia,2/2   # E. coli UTI, right sided pyelonephritis   Initially admitted to MICU to pressors, but was weaned off pressors 06/02 AM. Suspect bacteremia in the setting of acute colitis and bowel translocation, but cannot rule out UTI as well given positive UCx for E.coli. Allergies listed for ceftriaxone (rash) and ciprofloxacin (headache). TTE without EF 60-65% and no wall motion abnormality. No significant valvular abnormality. WBC trending down, but ongoing abdominal pain with softer BPs for now-see below. CT A/P below showing right sided hydronephrosis with some foci of focal nephronia concerning for right sided pyelo. Given E Coli was resistant to Zosyn on urine culture (but not blood culture), switched to Meropenum on 6/5. Given we are looking for an PO option, switched to levofloxacin and metronidazole. Discussed with pt and willing to try (headaches with Cipro). Meropenum started but stopped on 6/5 given switch to Levofloxacin and metronidazole as below. Low grade fever to 100.5 F overnight, but BCx with NGTD, HDS.  White blood cell count in the 11's overnight with a fever of 100.5. However CRP and WBCs remained stable. CXR /AXR withotu acute findings (some possible evolving PNA-see below). Switched again to meropenem IV and  discussed with ID.  Likely some inflammation given was changed to the proper antibiotics for urinary tract infection on 6/5, but should be on proper antibiotics based on current cultures.   - Stop Meropenum IV  - Switch to levofloxacin 750 mg PO daily for 14-day course from 6/5.  Has headaches with ciprofloxacin but tolerated the levofloxacin for the last several days without issue.  - Trend CBC daily   - Follow blood cultures, NGTD since 6/3/24  - Stop mIVF, gentle bolus as needed   - Monitor   - OP PCP referral     # Acute on chronic abdominal pain, ongoing   # Diarrhea, suspect 2/2 abx  # Gas pain    # Right colonic bowel thickening on CT non-contrast, resolved on repeat CT   # Concern for transient low flow state from hypotension   # Chronic pelvic pain   # Chronic constipation, resolved   # Diarrhea, resolved   # Suspected pelvic floor dysfunction   # Suspected hemorrhoids   # History of unspecified colitis  Initially presented with abdominal pain for ~2 weeks to urgent care.  Per chart review had episodes of colitis in the past in 2017 where patient underwent flex sig which was unremarkable at that time. No stool samples, but bacteremia with BCx with E.coli as above.- STD testing IP (HIV, Hep B/C, Chlamydia, Gonorrheae, Treponema- all negative) US pelvis on admission with no acute findings. CT Ab/Pelvis without contrast with colitis of cecum and ascending colon. No evidence of mesenteric ischemic on US doppler. Fat stranding and bowel thickening on initial CT do not correlate with rationale for initial critical illness as above. Repeat CT A/P with contrast undertaken on 6/5 given worsening abdominal pain, hypotension (likely due to medications).  See discussion above, likely cause of pain and sepsis due to E coli UTI based on findings on 6/5 CT, negative enteric panel, resolving bowel wall thickening. Suspect ongoing abdominal pain is also from vaginal infection (+ bacterial vaginosis, positive candida on  vaginal swab), urinary tract infection with pyelonephritis, transient low flow state from hypotension, mental health component given history of depression/PTSD, chronic pelvic pain, previously constipation now seems to have resolved.  - GI and Colorectal Surgery consulted, now signed off  - CAPs consult to discuss need for para in AM   - Mental health recs as below   - Consider pain consult if ongoing pain despite treatment for infection, PRNs below   - Trend CBC daily   - Mirlax BID, titrate up to TID per GI recs for 3 BMs weekly   - Bentryl 10 mg QID  - PRN Tucks, schedule prep H  - PRN simethicone, dicyclomine   - Will need GI and Colonoscopy OP, GI to arrange   - Pelvic floor PT OP consult     # Hypotension, resolved   Likely due to medications and loose stool given normalizing WBC, lnormal actic on 6/5. Improving with gentle fluid boluses and hydration   - Watch opiate/benzo dosing   - Recommend small mIVF bolus PRN    # MDD  # ADHD  # PTSD  Was previously on bupropion and mirtazapine, but no longer taking due to finances. PTA ativan 2mg TID PRN.   - Psychiatry and Health Psychology given labile mood, chronic pain  - Restart Ativan; schedule 2 mg BID, add 2 mg at bedtime for tonight  - Nicotine patch 14-21 mg (smokes 1/2 PPD)  - Restart Remeron 30 mg   - Defer prazosin or clonidine as BP is low  - Start Wellbutrin (bupropion)  mg in the morning, increase to 300 mg in a week as tolerated.  - Okay to not get vital signs and to start patient from 12 to 6 AM, however if patient needs to be assessed urgently please enter the room and get needed vitals and assessments overnight.  Patient care order placed  - PCP and OP mental health referrals at discharge    # Bacterial vulvovaginitis   - Abx as above, continue for 7-day course from 6/5    # Candida vulvovaginitis   - Fluconazole 150 mg x1, repeat after abx course x1 dose at discharge      # YOLANDA, prerenal, improving  See 2.61 on admission. See current trends  below. Likely in the setting of septic shock and UTI as above.   - Trend BMP daily   - Treat infections as above   - Encourage PO intake     Creatinine   Date Value Ref Range Status   06/07/2024 1.05 (H) 0.51 - 0.95 mg/dL Final   06/06/2024 1.08 (H) 0.51 - 0.95 mg/dL Final   06/05/2024 1.28 (H) 0.51 - 0.95 mg/dL Final   06/04/2024 1.28 (H) 0.51 - 0.95 mg/dL Final   06/04/2024 1.44 (H) 0.51 - 0.95 mg/dL Final   06/03/2024 1.74 (H) 0.51 - 0.95 mg/dL Final   06/02/2024 1.87 (H) 0.51 - 0.95 mg/dL Final   06/02/2024 2.23 (H) 0.51 - 0.95 mg/dL Final   06/01/2024 2.45 (H) 0.51 - 0.95 mg/dL Final   06/01/2024 2.61 (H) 0.51 - 0.95 mg/dL Final     Creatinine POCT   Date Value Ref Range Status   06/01/2024 3.2 (H) 0.5 - 1.0 mg/dL Final     # Focal renal hypodensities, suspect possible cyst with hemorrhagic or proteinases contents   Seen on CT A/P Hgb relatively stable as below.   - Ultrasound follow-up OP unless clinically decompensates.   - Trend WBC, Hgb   - Treat infection as above  -Follow-up outpatient    # B/L pleural effusions  Seen on CT A/P. On RA. No shortness of breath. No cough, ongoing fevers, broad coverage throughout stay, so less likely ID and more likely due to fluid overload.  Repeat chest x-ray on 6/7 with trace pleural effusions with overlying opacities. Less suspicious for pneumonia given no cough and new oxygen needs.  Procalcitonin greatly decreased from admission at 1.07 from 84.70. If this is a new or developing pneumonia, fluoroquinolones will cover.    - Discuss with CAPs team, not enough fluid to tap  - Stop unneeded mIVF  - IS, ambulation   - Abx as above    # Normocytic anemia  No signs or symptoms of acute bleeding. Possible dilutional component with recent boluses in the setting of shock.   -Trend CBC daily      # Thrombocytopenia, mild   No signs or symptoms of acute bleeding. Possible dilutional component with recent boluses in the setting of shock.   -Trend CBC daily     # Hypokalemia  #  "Hypomagnesemia  Likely due to loose stool.  - RN replacement protocols     #Asthma: without acute exacerbation      Resolved:   # Hyponatremia, mild, resolved           Diet: Regular Diet Adult    DVT Prophylaxis: Enoxaparin (Lovenox) SQ and Ambulate every shift  Eli Catheter: Not present  Lines: None     Cardiac Monitoring: None  Code Status: Full Code      Clinically Significant Risk Factors        # Hypokalemia: Lowest K = 3.2 mmol/L in last 2 days, will replace as needed     # Hypomagnesemia: Lowest Mg = 1.6 mg/dL in last 2 days, will replace as needed   # Hypoalbuminemia: Lowest albumin = 2.3 g/dL at 6/4/2024  2:11 PM, will monitor as appropriate                  # Obesity: Estimated body mass index is 33.68 kg/m  as calculated from the following:    Height as of this encounter: 1.473 m (4' 10\").    Weight as of this encounter: 73.1 kg (161 lb 2.5 oz).             Disposition Plan     Medically Ready for Discharge: Anticipated Tomorrow, to home, want to ensure does not fever, has controlled abdominal pain, inflammatory markers remain stable.     The patient's care was discussed with the Attending Physician, Dr. López, Bedside Nurse, Patient, Patient's Family, and ID Consultant(s).    BILLIE August [facility]  Hospitalist Service, GOLD TEAM [address] [place] [address]  Securely message with [employer] (more info)  Text page via [facility] Paging/Directory   See signed in provider for up to date coverage information    This chart documentation was completed with Dragon voice-recognition software. Even though reviewed, this chart may still contain some grammatical, spelling, and word errors. Please contact the author for any questions or clarifications.   __________________________________________________________________    Interval History   Nursing/SW/consult/interdisciplinary healthcare worker's notes reviewed.     I reviewed all medications, new labs and imaging results over the last " 24 hours. Please see discussion of these results in the A/P.    Vital signs stable with a Tmax of 100.5 F last night.  White blood cell trending upward slightly at 11.4 UA redrawn last night.  Started on meropenem again this morning given slightly elevated white count and fevers, ongoing abdominal pain needing IV Dilaudid.  Patient noting subjective fevers but no nausea.  She has ongoing diarrhea despite not taking scheduled MiraLAX.  Also noting that she was having tarry stools, but I did visualize her stools and they were dark brown and had no black color or red color is noted throughout.    Patient initially wanting to leave but per discussions at bedside with mother, ID provider, and myself patient agreeable to stay overnight did states she is having trouble sleeping and also concerned she might lose her job scheduled Ativan and wrote a work note for her.     ROS: 12 point ROS negative other than the symptoms noted here or above in the assessment and plan.       Physical Exam   Vital Signs: Temp: 98.7  F (37.1  C) Temp src: Oral BP: 104/64 Pulse: 64   Resp: 16 SpO2: 98 % O2 Device: None (Room air)    Weight: 161 lbs 2.5 oz    General Appearance:  In mild acute distress in bed, clutching abdomen, but less than yesterday   Respiratory: LS clear b/l, normal RR  Cardiovascular: S1, S2, no m/r/g  GI: BS+, all 4 quadrants, no masses,- B/L flank tenderness, mild TTP throughout abdomen   Skin: Intact on face, arms, legs. No wounds, bruising, or lesions noted. Dirty fingernails   Neuropsych:A&Ox4, moving all extremities, labile mood    Medical Decision Making       60 MINUTES SPENT BY ME on the date of service doing chart review, history, exam, documentation & further activities per the note.      Data     I have personally reviewed the following data over the past 24 hrs:    11.4 (H)  \   9.4 (L)   / 277     140 110 (H) 6.6 /  96   3.7 23 1.05 (H) \     Procal: 1.07 (H) CRP: 36.50 (H) Lactic Acid: N/A         Imaging  results reviewed over the past 24 hrs:   Recent Results (from the past 24 hour(s))   XR Abdomen 2 Views    Narrative    XR ABDOMEN 2 VIEWS  6/7/2024 10:51 AM     HISTORY:  Ongoing abdominal pain, fevers, bowel movements, proper tx  for UTI, assess for SBO     COMPARISON:  6/4/2024 CT abdomen/pelvis    TECHNIQUE: Supine and upright views of the abdomen.     FINDINGS:   Surgical clips project over the right upper quadrant presumably from a  prior cholecystectomy.    Mild gaseous distention of large bowel loops. There are no abnormal  calcifications or evidence of organomegaly. Benign pelvic phleboliths.  No significant stool burden in the colon. The visualized lung bases  are clear.        Impression    IMPRESSION:   Mild gaseous distention of large bowel loops is nonspecific. No  radiographic evidence for small bowel obstruction.      I have personally reviewed the examination and initial interpretation  and I agree with the findings.    THELMA OCHOA DO         SYSTEM ID:  [id number]   XR Chest 2 Views    Narrative    XR CHEST 2 VIEWS  6/7/2024 10:52 AM     HISTORY:  Fever through broad spectrum abx, RLL crackles, assess for  infection       COMPARISON:  None.     TECHNIQUE: Upright PA and lateral projection radiographs of the chest    FINDINGS:   Trachea is midline. Cardiomediastinal silhouette is within normal  limits. Trace bilateral pleural effusion with overlying streaky and  patchy opacities. No pneumothorax. Normal osseous structures.  Visualized upper abdomen is unremarkable.        Impression    IMPRESSION:  Trace pleural effusions with overlying opacities, suspicious for  pneumonia with parapneumonic effusion.    I have personally reviewed the examination and initial interpretation  and I agree with the findings.    DANIA VANCE MD         SYSTEM ID:  [id number]

## 2024-06-07 NOTE — PLAN OF CARE
"Goal Outcome Evaluation:      Plan of Care Reviewed With: patient    Overall Patient Progress: no change Overall Patient Progress: no change     /53 (BP Location: Right arm, Patient Position: Supine, Cuff Size: Adult Small)   Pulse 63   Temp 98.6  F (37  C) (Oral)   Resp 20   Ht 1.473 m (4' 10\")   Wt 73.1 kg (161 lb 2.5 oz)   SpO2 95%   BMI 33.68 kg/m       VSS on RA except bradycardia. Temperature increased to 100.4 during shift. Provider notified and aware that pt is allergic to Tylenol, and with low reaction, pt is refusing Tylenol. Provided pt with a cool fan to prevent increasing temperature. Provider ordered ibuprofen PRN for pain to manage along with PRN dilaudid. BM x1 during shift. Pt showered independently. Pending urine culture and UA.   "

## 2024-06-07 NOTE — PROGRESS NOTES
"Brief Medicine Note    Contacted by nursing regarding T 100.4.  Patient reports that her only symptom is chills.    Today's vital signs, medications, and nursing notes were reviewed. Labs reviewed.     BP 98/69 (BP Location: Left arm)   Pulse 56   Temp 99.7  F (37.6  C) (Oral)   Resp 18   Ht 1.473 m (4' 10\")   Wt 73.1 kg (161 lb 2.5 oz)   SpO2 96%   BMI 33.68 kg/m        A/P:  Recent septic shock in the setting of E. coli bacteremia with E. coli UTI and right pyelonephritis  Ongoing fevers  -Added as needed ibuprofen to be used sparingly for fevers  -Pending UA UC  -Patient already has blood cultures pending from today    Wet prep positive for BV and Candida  -Patient recently received fluconazole  -Already receiving metronidazole    Lesly Doe PA-C  [facility]  Contact information available via [facility] Paging/Directory     "
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
## 2024-06-07 NOTE — PLAN OF CARE
Goal Outcome Evaluation:           Overall Patient Progress: no changeOverall Patient Progress: no change    Outcome Evaluation: Pt is alert and oriented x4, VSS on RA except bradycardia and hypotension. Pt denies nuasea, reported pain of 7/10 and was given IV diluadid x2, and oral x1. without relief. R/L PIV, SL. No BM this shift and on strict I/Os. Pt calls appropriately, continue with POC.

## 2024-06-07 NOTE — CONSULTS
Health Psychology                                                                                                                          Park Mullins, Ph.D., L.P [phone]  Lindsey Massey, Ph.D., L.P. [phone]  Maribeth Espinal, Ph.D, L..P. [phone]  Zahra Madrigal, Ph.D., L.P. [phone]  Will Jeronimo, Ph.D., A.B.P.P., L.P. [phone]         Marlyn Banks, Ph.D., L.P. [phone]       Arabella Morin, Ph.D., A.B.P.P., LP [phone]           [facility], 3rd Floor  [address] [place] [address]       Inpatient Health Psychology Consultation      Date of Service:  06/07/24    Initial attempts - patient meeting with another provider, then many people and mother going into room.     Will follow-up when providers next available.     Maribeth Espinal, PhD,   Clinical Health Psychologist  Phone: [phone]  Pager: [phone]

## 2024-06-07 NOTE — CONSULTS
[facility]EASES CONSULTATION     Patient:  Alisha Crowe   Date of birth 1980, Medical record number [id number]  Date of Visit:  06/07/2024  Date of Admission: 6/1/2024  Consult Requester:Tessy López MD          Assessment and Recommendations:   ASSESSMENT:  Fever, leukocytosis  E.coli bacteremia  Pyelonephritis  Bacterial vaginosis  Vulvovaginal candidiasis  Abdominal pain  Bowel wall thickening on CT 6/1, resolved 6/4  Hx colitis (2017)  Hx pelvic pain and pelvic floor dysfunction    DISCUSSION:   Alisha Crowe is a 43 year old female with history of PTSD, MDD, ADHD, pelvic floor dysfunction, chronic pelvic pain, constipation, and previous colitis who was admitted on 6/1/24 from urgent care with hypotension and abdominal pain. Found to have E.coli bacteremia as well as E.coli on urine culture with findings concerning for pyelonephritis and imaging with possible colitis.     Initial fever to 103.3F with leukocytosis to 29.6. Blood cultures with E.coli (R: ampicillin, amp/sulbactam, TMP/SMX) on 6/1, 6/2, and no growth from 6/3 forward. Urine culture with <10K of E.coli (R: ampicillin, amp/sulbactam, TMP/SMX, Pip/tazo). Initial CT 6/1 with bowel wall thickening, repeat CT 6/4 with resolution of thickening though notes right kidney larger than left with multifocal hypodensities. Concern for GI vs pyelonephritis as source of bacteremia, however GI less likely without pre-hospital diarrhea and imaging findings resolved. Was on Zosyn until 6/5, which may explain why fevers persisted- would have covered blood isolates but not urine isolate. Typical course for gram negative bacteremia would be ~7-10 days, however with pyelonephritis and imaging findings would plan for 10-14 days of treatment, and favor 14 if patient discharges in next 24hrs.      RECOMMENDATION:  Can transition from meropenem back to either ciprofloxacin or Levofloxacin with plan for 14 days from 6/5 (date of start of  treatment for pyelonephritis), which will provide coverage of   Resume metronidazole x7 days (start 6/5) for bacterial vaginosis  OK to repeat fluconazole 10-14 days from the 6/4 dose or at end of abx course  Follow fever curve, WBC    Thank you for this consult. ID will continue to follow.     Patient was discussed with Dr. Kent.     Autumn Viera PA-C  Pronouns: she/her/hers  Infectious Diseases  Contact via [employer] or [facility] Paging/Directory    ________________________________________________________________    Consult Question: Ongoing fevers, rising white count, abd pain (multifactorial), despite being on proper abx for E.coli UTIs, E.coli bacteremia, and bacterial/yeast vaginitis   Admission Diagnosis: Septic shock (H) [A41.9, R65.21]         History of Present Illness:     Alisha Crowe is a 43 year old female with history of PTSD, MDD, ADHD, pelvic floor dysfunction, chronic pelvic pain, constipation, and previous colitis who was admitted on 6/1/24 from urgent care with hypotension and abdominal pain. Found to have E.coli bacteremia as well as E.coli on urine culture with findings concerning for pyelonephritis and imaging with possible colitis.     Initially had high fever, resolved for a few days and returned overnight Abdominal pain has been persistent but is better overall. She initially had constipation but after bowel medications and antibiotics has been having diarrhea. No diarrhea before the hospital. Had flank pain R>L which has improved. No dysuria, urgency or frequency. Reported vulvar pain and itching- found to have vulvovaginal candidiasis and bacterial vaginosis.     Denies rigors, night sweats, headache, sore throat, shortness of breath, cough, joint pain, rashes, nausea, vomiting, dysuria.    Lives with partner and 15 year old son. They have a dog and a bearded dragon. No raw or undercooked foods, does eat meat. No recent travel or travel outside of the [place]. Works as a .             Past Medical History:   No past medical history on file.         Past Surgical History:   No past surgical history on file.         Family History:   Reviewed and non-contributory.   No family history on file.         Social History:     Social History     Tobacco Use    Smoking status: Not on file    Smokeless tobacco: Not on file   Substance Use Topics    Alcohol use: Not on file     History   Sexual Activity    Sexual activity: Not on file            Current Medications:     Current Facility-Administered Medications   Medication Dose Route Frequency Provider Last Rate Last Admin    acetaminophen (TYLENOL) tablet 975 mg  975 mg Oral Q8H Rik Galloway MD   975 mg at 06/06/24 0403    buPROPion (WELLBUTRIN XL) 24 hr tablet 150 mg  150 mg Oral Daily Ella Alex APRN CNP        dicyclomine (BENTYL) capsule 10 mg  10 mg Oral 4x Daily Ron Rincon MD   10 mg at 06/06/24 2001    Lidocaine (LIDOCARE) 4 % Patch 1 patch  1 patch Transdermal Q24h Rik Galloway MD   1 patch at 06/06/24 2208    LORazepam (ATIVAN) tablet 2 mg  2 mg Oral BID Ella Alex APRN CNP   2 mg at 06/07/24 1019    meropenem (MERREM) 1 g vial to attach to  mL bag  1 g Intravenous Q8H Ella Alex APRN CNP   1 g at 06/07/24 0901    mirtazapine (REMERON) tablet 30 mg  30 mg Oral At Bedtime Ella Alex APRN CNP        nicotine (NICODERM CQ) 21 MG/24HR 24 hr patch 1 patch  1 patch Transdermal Daily Ella Alex APRN CNP        polyethylene glycol (MIRALAX) Packet 17 g  17 g Oral BID Ella Alex APRN CNP   17 g at 06/06/24 0821    pramox-pe-glycerin-petrolatum (PREPARATION H) cream   Rectal TID Ella Alex APRN CNP   Given at 06/06/24 2220    sodium chloride (PF) 0.9% PF flush 3 mL  3 mL Intracatheter Q8H Rik Galloway MD   3 mL at 06/07/24 0408            Allergies:     Allergies   Allergen Reactions    Codeine Hives    Meperidine Hives    Ceftriaxone Rash    Ciprofloxacin  Headache    Hydrocodone-Acetaminophen Nausea and Vomiting    Nalbuphine Nausea and Vomiting    Tylenol [Acetaminophen] Nausea and Vomiting            Physical Exam:   Vitals were reviewed  Temp:  [98.3  F (36.8  C)-100.4  F (38  C)] 98.3  F (36.8  C)  Pulse:  [52-63] 52  Resp:  [16-20] 16  BP: ()/(50-70) 105/50  SpO2:  [95 %-100 %] 95 %    Physical Examination:  GENERAL:  well-developed, well-nourished,supine in bed. Appears anxious.  HEENT:  Head is normocephalic, atraumatic   EYES:  Eyes have anicteric sclerae without conjunctival injection or stigmata of endocarditis.    ENT:  Oropharynx is moist  LUNGS:  Clear to auscultation bilateral without wheeze or cackles.   CARDIOVASCULAR:  RRR, +S1/S2, no murmur  ABDOMEN:  Soft, nondistended, + bowel sounds, nontender with light palpation.  : No CVA tenderness  SKIN:  No acute rashes.  Line(s) are in place without any surrounding erythema or exudate.  NEUROLOGIC:  Grossly nonfocal. Active x4 extremities         Laboratory Data:   Microbiology:  Susceptibility data from last 90 days.  Collected Specimen Info Organism Ampicillin Ampicillin/Sulbactam Cefazolin Cefepime Cefoxitin Ceftazidime Ceftriaxone Ciprofloxacin Gentamicin Levofloxacin Meropenem Nitrofurantoin Piperacillin/Tazobactam Tobramycin   06/02/24 Blood from Arm, Left Escherichia coli                 06/02/24 Blood from Arm, Left Escherichia coli                 06/01/24 Urine, Straight Catheter Escherichia coli  R  R  S  S  S  S  S  S  S  S   S  R  S     Urogenital rene                 06/01/24 Peripheral Blood Escherichia coli  R  R   S   S  S  S  S  S  S   S  S   06/01/24 Peripheral Blood Escherichia coli                   Collected Specimen Info Organism Trimethoprim/Sulfamethoxazole    06/02/24 Blood from Arm, Left Escherichia coli    06/02/24 Blood from Arm, Left Escherichia coli    06/01/24 Urine, Straight Catheter Escherichia coli  R     Urogenital rene    06/01/24 Peripheral Blood Escherichia  "coli  R   06/01/24 Peripheral Blood Escherichia coli        Inflammatory Markers    Recent Labs   Lab Test 06/07/24  0608 06/06/24  0623 06/05/24  0632   CRPI 36.50* 48.00* 69.00*       Hematology Studies    Recent Labs   Lab Test 06/07/24  0608 06/06/24  0623 06/05/24  0632 06/04/24  0347 06/03/24  0507 06/02/24  0508 06/01/24  1209 06/01/24  1201   WBC 11.4* 10.8 10.0 10.8   < > 22.2*  --  29.6*   ANEU 7.9  --   --   --   --  21.3*  --  28.4*   AEOS 0.1  --   --   --   --  0.0  --  0.0   HGB 9.4* 9.7* 9.5* 10.1*   < > 10.6*   < > 11.1*   MCV 90 89 91 90   < > 88  --  90    211 143* 131*   < > 116*  --  142*    < > = values in this interval not displayed.       Metabolic Studies     Recent Labs   Lab Test 06/07/24  0608 06/06/24  1551 06/06/24  0623 06/05/24  0632 06/04/24  1411     --  138 138 137   POTASSIUM 3.7 3.9 3.2* 3.6 3.9   CHLORIDE 110*  --  108* 107 107   CO2 23  --  21* 20* 19*   BUN 6.6  --  6.7 11.2 12.9   CR 1.05*  --  1.08* 1.28* 1.28*   GFRESTIMATED 68  --  65 53* 53*       Hepatic Studies    Recent Labs   Lab Test 06/04/24  1411 06/04/24  0347 06/01/24  2152   BILITOTAL 0.6 0.7 0.4   ALKPHOS 87 101 87   ALBUMIN 2.3* 3.0* 3.0*   AST 24 38 74*   ALT 25 36 66*       Urine Studies    Recent Labs   Lab Test 06/06/24  2349   LEUKEST Trace*   WBCU 18*       Vancomycin Levels  No lab results found.    Invalid input(s): \"VANCO\"    Hepatitis B Testing   Recent Labs   Lab Test 06/04/24  0347 06/01/24  1501   HBCAB  --  Nonreactive   HEPBANG Nonreactive  --      Hepatitis C Testing     Hepatitis C Antibody   Date Value Ref Range Status   06/04/2024 Nonreactive Nonreactive Final     Comment:     A nonreactive screening test result does not exclude the possibility of exposure to or infection with HCV. Nonreactive screening test results in individuals with prior exposure to HCV may be due to antibody levels below the limit of detection of this assay or lack of reactivity to the HCV antigens used in " this assay. Patients with recent HCV infections (<3 months from time of exposure) may have false-negative HCV antibody results due to the time needed for seroconversion (average of 8 to 9 weeks).             Imaging:     CT abd/pelvis w/ contrast 6/4/24  Impression:  1. Right kidney is larger than left with multifocal patchy areas of  hypodensity involving the right kidney, mild right-sided  hydronephrosis with urothelial enhancement with  few ill marginated  areas of peripheral hypodensity, findings suggest infiltrative  etiology including pyelonephritis with possible foci of focal  nephronia. Recommend correlation with urinalysis and consider imaging  follow-up to resolution to exclude less likely infiltrative neoplastic  etiology.  2. Focal renal circumscribed hypodensities with intermediate  attenuation, possibly cysts with hemorrhagic or proteinaceous contents  may consider ultrasound for evaluation.  3. Hepatomegaly with mildly heterogeneous appearance of the liver with  periportal edema may represent congestive or inflammatory changes.  4. Increased bilateral effusion, right more than left compared to  prior CT from 6/1/2024.  5. Increased ascites compared to CT from 6/1/2024.  6. No high-grade bowel obstruction, pneumatosis or significant bowel  wall thickening.  7. Patchy groundglass density in the lung bases right more than left  may represent atelectasis, pulmonary edema or infection/inflammation.    Us abdomen arterial/venous doppler 6/2/24  IMPRESSION: Inferior mesenteric artery not evaluated. Otherwise  negative study.    Us pelvic transvaginal 6/1/24  IMPRESSION: Normal pelvic ultrasound.  No sonographic evidence of  ovarian torsion.    CT Abd/pelvis w/contrast 6/1/24  IMPRESSION:   Examination is limited due to lack of IV contrast. Within the  limitations of the examination, findings are concerning for colitis  with bowel wall thickening and adjacent inflammatory changes involving  the cecum and  ascending colon. Differential considerations include  infectious, inflammatory or possible ischemic etiology given history  of hypotension.

## 2024-06-08 VITALS
BODY MASS INDEX: 33.83 KG/M2 | TEMPERATURE: 100.3 F | DIASTOLIC BLOOD PRESSURE: 54 MMHG | HEART RATE: 60 BPM | RESPIRATION RATE: 16 BRPM | OXYGEN SATURATION: 98 % | SYSTOLIC BLOOD PRESSURE: 97 MMHG | HEIGHT: 58 IN | WEIGHT: 161.16 LBS

## 2024-06-08 LAB
ANION GAP SERPL CALCULATED.3IONS-SCNC: 9 MMOL/L (ref 7–15)
BACTERIA BLD CULT: NO GROWTH
BACTERIA BLD CULT: NO GROWTH
BACTERIA UR CULT: NO GROWTH
BASOPHILS # BLD AUTO: ABNORMAL 10*3/UL
BASOPHILS # BLD MANUAL: 0 10E3/UL (ref 0–0.2)
BASOPHILS NFR BLD AUTO: ABNORMAL %
BASOPHILS NFR BLD MANUAL: 0 %
BUN SERPL-MCNC: 6.6 MG/DL (ref 6–20)
CALCIUM SERPL-MCNC: 8.9 MG/DL (ref 8.6–10)
CHLORIDE SERPL-SCNC: 106 MMOL/L (ref 98–107)
CREAT SERPL-MCNC: 1 MG/DL (ref 0.51–0.95)
CRP SERPL-MCNC: 24.9 MG/L
DEPRECATED HCO3 PLAS-SCNC: 24 MMOL/L (ref 22–29)
EGFRCR SERPLBLD CKD-EPI 2021: 72 ML/MIN/1.73M2
EOSINOPHIL # BLD AUTO: ABNORMAL 10*3/UL
EOSINOPHIL # BLD MANUAL: 0.2 10E3/UL (ref 0–0.7)
EOSINOPHIL NFR BLD AUTO: ABNORMAL %
EOSINOPHIL NFR BLD MANUAL: 2 %
ERYTHROCYTE [DISTWIDTH] IN BLOOD BY AUTOMATED COUNT: 14.2 % (ref 10–15)
GLUCOSE SERPL-MCNC: 110 MG/DL (ref 70–99)
HCT VFR BLD AUTO: 31.8 % (ref 35–47)
HGB BLD-MCNC: 10.7 G/DL (ref 11.7–15.7)
IMM GRANULOCYTES # BLD: ABNORMAL 10*3/UL
IMM GRANULOCYTES NFR BLD: ABNORMAL %
LYMPHOCYTES # BLD AUTO: ABNORMAL 10*3/UL
LYMPHOCYTES # BLD MANUAL: 2.2 10E3/UL (ref 0.8–5.3)
LYMPHOCYTES NFR BLD AUTO: ABNORMAL %
LYMPHOCYTES NFR BLD MANUAL: 18 %
MAGNESIUM SERPL-MCNC: 1.7 MG/DL (ref 1.7–2.3)
MCH RBC QN AUTO: 30.1 PG (ref 26.5–33)
MCHC RBC AUTO-ENTMCNC: 33.6 G/DL (ref 31.5–36.5)
MCV RBC AUTO: 89 FL (ref 78–100)
METAMYELOCYTES # BLD MANUAL: 0.1 10E3/UL
METAMYELOCYTES NFR BLD MANUAL: 1 %
MONOCYTES # BLD AUTO: ABNORMAL 10*3/UL
MONOCYTES # BLD MANUAL: 1.1 10E3/UL (ref 0–1.3)
MONOCYTES NFR BLD AUTO: ABNORMAL %
MONOCYTES NFR BLD MANUAL: 9 %
NEUTROPHILS # BLD AUTO: ABNORMAL 10*3/UL
NEUTROPHILS # BLD MANUAL: 8.5 10E3/UL (ref 1.6–8.3)
NEUTROPHILS NFR BLD AUTO: ABNORMAL %
NEUTROPHILS NFR BLD MANUAL: 70 %
NRBC # BLD AUTO: 0 10E3/UL
NRBC BLD AUTO-RTO: 0 /100
PLAT MORPH BLD: ABNORMAL
PLATELET # BLD AUTO: ABNORMAL 10*3/UL
POLYCHROMASIA BLD QL SMEAR: SLIGHT
POTASSIUM SERPL-SCNC: 3.3 MMOL/L (ref 3.4–5.3)
RBC # BLD AUTO: 3.56 10E6/UL (ref 3.8–5.2)
RBC MORPH BLD: ABNORMAL
SODIUM SERPL-SCNC: 139 MMOL/L (ref 135–145)
WBC # BLD AUTO: 12.2 10E3/UL (ref 4–11)

## 2024-06-08 PROCEDURE — 80048 BASIC METABOLIC PNL TOTAL CA: CPT | Performed by: HOSPITALIST

## 2024-06-08 PROCEDURE — 250N000013 HC RX MED GY IP 250 OP 250 PS 637: Performed by: STUDENT IN AN ORGANIZED HEALTH CARE EDUCATION/TRAINING PROGRAM

## 2024-06-08 PROCEDURE — 250N000013 HC RX MED GY IP 250 OP 250 PS 637

## 2024-06-08 PROCEDURE — 36415 COLL VENOUS BLD VENIPUNCTURE: CPT | Performed by: HOSPITALIST

## 2024-06-08 PROCEDURE — 250N000011 HC RX IP 250 OP 636: Mod: JZ

## 2024-06-08 PROCEDURE — 99239 HOSP IP/OBS DSCHRG MGMT >30: CPT | Mod: FS | Performed by: STUDENT IN AN ORGANIZED HEALTH CARE EDUCATION/TRAINING PROGRAM

## 2024-06-08 PROCEDURE — 85048 AUTOMATED LEUKOCYTE COUNT: CPT | Performed by: HOSPITALIST

## 2024-06-08 PROCEDURE — 99207 PR APP CREDIT; MD BILLING SHARED VISIT: CPT | Mod: FS

## 2024-06-08 PROCEDURE — 83735 ASSAY OF MAGNESIUM: CPT | Performed by: INTERNAL MEDICINE

## 2024-06-08 PROCEDURE — 86140 C-REACTIVE PROTEIN: CPT

## 2024-06-08 PROCEDURE — 85007 BL SMEAR W/DIFF WBC COUNT: CPT | Performed by: HOSPITALIST

## 2024-06-08 PROCEDURE — 250N000013 HC RX MED GY IP 250 OP 250 PS 637: Performed by: INTERNAL MEDICINE

## 2024-06-08 RX ORDER — POLYETHYLENE GLYCOL 3350 17 G/17G
1 POWDER, FOR SOLUTION ORAL DAILY
Qty: 17 G | Refills: 0 | Status: SHIPPED | OUTPATIENT
Start: 2024-06-08

## 2024-06-08 RX ORDER — POTASSIUM CHLORIDE 750 MG/1
40 TABLET, EXTENDED RELEASE ORAL ONCE
Status: COMPLETED | OUTPATIENT
Start: 2024-06-08 | End: 2024-06-08

## 2024-06-08 RX ADMIN — LORAZEPAM 2 MG: 1 TABLET ORAL at 09:34

## 2024-06-08 RX ADMIN — DICYCLOMINE HYDROCHLORIDE 10 MG: 10 CAPSULE ORAL at 09:34

## 2024-06-08 RX ADMIN — ACETAMINOPHEN 975 MG: 325 TABLET, FILM COATED ORAL at 06:22

## 2024-06-08 RX ADMIN — HYDROMORPHONE HYDROCHLORIDE 0.4 MG: 0.2 INJECTION, SOLUTION INTRAMUSCULAR; INTRAVENOUS; SUBCUTANEOUS at 02:16

## 2024-06-08 RX ADMIN — POTASSIUM CHLORIDE 40 MEQ: 750 TABLET, EXTENDED RELEASE ORAL at 09:35

## 2024-06-08 RX ADMIN — BUPROPION HYDROCHLORIDE 150 MG: 150 TABLET, FILM COATED, EXTENDED RELEASE ORAL at 09:34

## 2024-06-08 RX ADMIN — HYDROMORPHONE HYDROCHLORIDE 2 MG: 2 TABLET ORAL at 12:26

## 2024-06-08 RX ADMIN — METRONIDAZOLE 500 MG: 500 TABLET ORAL at 09:36

## 2024-06-08 RX ADMIN — HYDROMORPHONE HYDROCHLORIDE 2 MG: 2 TABLET ORAL at 06:22

## 2024-06-08 ASSESSMENT — ACTIVITIES OF DAILY LIVING (ADL)
ADLS_ACUITY_SCORE: 23

## 2024-06-08 NOTE — DISCHARGE SUMMARY
Discharge to: Home  Transportation:  picking her up.  Time: 12:40pm.  Prescriptions: Picking up at main [facility] pharmacy downstairs.  Belongings: Sent with patient.  Access: PIV removed.  Care plan and education discontinued: Completed.  Paperwork:  AVS printed, reviewed and sent home with patient.

## 2024-06-08 NOTE — DISCHARGE SUMMARY
"[facility]  Hospitalist Discharge Summary      Date of Admission:  6/1/2024  Date of Discharge:  6/8/2024 12:40 PM  Discharging Provider: BILLIE August CNP  Discharge Service: Hospitalist Service, GOLD TEAM 5    Discharge Diagnoses     # Septic shock 2/2 E. coli bacteremia,2/2   # E. coli UTI, right sided pyelonephritis   # Acute on chronic abdominal pain, ongoing   # Diarrhea, suspect 2/2 abx  # Gas pain    # Right colonic bowel thickening on CT non-contrast, resolved on repeat CT   # Concern for transient low flow state from hypotension   # Chronic pelvic pain   # Chronic constipation, resolved   # Diarrhea, resolved   # Suspected pelvic floor dysfunction   # Suspected hemorrhoids   # History of unspecified colitis  # Hypotension, resolved   # MDD  # ADHD  # PTSD  # Bacterial vulvovaginitis   # Candida vulvovaginitis   # YOLANDA, prerenal, improving  # Focal renal hypodensities, suspect possible cyst with hemorrhagic or proteinases contents   # B/L pleural effusions  # Normocytic anemia  # Thrombocytopenia, mild   # Hypokalemia  # Hypomagnesemia  #Asthma  # Hyponatremia, mild, resolved     Clinically Significant Risk Factors     # Obesity: Estimated body mass index is 33.68 kg/m  as calculated from the following:    Height as of this encounter: 1.473 m (4' 10\").    Weight as of this encounter: 73.1 kg (161 lb 2.5 oz).       Follow-ups Needed After Discharge   -Please ensure proper hydration over the next several days while you are on antibiotics.  Recommend at least 2 L/day of water.  -Please take antibiotics as prescribed. If you have any worsening fevers, back pain, abdominal pain, painful urination, or any other concerning symptoms please return to the emergency room or an urgent care to be evaluated.  -Please follow-up with a new primary care provider, outpatient GI, and PT for pelvic floor therapy.  -Outpatient psychology consult if you choose to take this for " ongoing therapy.        Unresulted Labs Ordered in the Past 30 Days of this Admission       Date and Time Order Name Status Description    6/6/2024  7:59 AM Blood Culture Hand, Right Preliminary     6/6/2024  7:59 AM Blood Culture Hand, Left Preliminary         These results will be followed up by Medicine pool.     Discharge Disposition   Discharged to home  Condition at discharge: Stable    Hospital Course   Alisha Crwoe is a 43 year old female with a PMH significant for MDD, ADHD, PTSD, chronic pelvic pain (previously on chronic oxycodone), constipation, previous episodes of colitis who was admitted on 06/01/2024 from urgent care with abdominal pain and hypotension. Initially admitted to the MICU for septic shock and found to have e coli bacteremia and colitis. CRS and GI consulted.  Initially required pressors, but weaned off 06/02 and transferred to Medicine service on 06/03.      # Septic shock 2/2 E. coli bacteremia,2/2   # E. coli UTI, right sided pyelonephritis   Initially admitted to MICU with need for pressors, but was weaned off pressors 6/02 AM. Suspect bacteremia in the setting of acute colitis and bowel translocation, but positive for E.coli in urine with low cell counts. With increase in abdominal pain and right flank pain, imaging obtained. CT A/P below showing right sided hydronephrosis with some foci of focal nephronia concerning for right sided pyelo. Given E Coli was resistant to Zosyn on urine culture (but not blood culture), switched to Meropenum on 6/5. Switched to levofloxacin and metronidazole for PO option. Discussed with pt and willing to try levofloxacin (headaches with Cipro).  Low grade fever to 100.5 F overnight on 6/6 with WBC trending up with no change in abdominal pain, but BCx with NGTD, Cr and CRP trending down. Re imaged and switched back to Meropenum. CXR /AXR without major overt acute findings (some possible evolving PNA-see below). Discussed with ID, and placed back on  levofloxacin and metronidazole PO prior to discharge. Likely some inflammation given was changed to the proper antibiotics for urinary tract infection on 6/5.  - Levofloxacin 750 mg PO daily for 14-day course from 6/5.  Has headaches with ciprofloxacin but tolerated levofloxacin without issue.  - Follow blood cultures, NGTD since 6/3/24  - OP PCP referral, trend CBC, BMP then     # Acute on chronic abdominal pain, ongoing   # Diarrhea, suspect 2/2 abx  # Gas pain    # Right colonic bowel thickening on CT non-contrast, resolved on repeat CT   # Concern for transient low flow state from hypotension   # Chronic pelvic pain   # Chronic constipation, resolved   # Diarrhea, resolved   # Suspected pelvic floor dysfunction   # Suspected hemorrhoids   # History of unspecified colitis  See by GI and General Surgery during stay. Initially presented with abdominal pain for ~2 weeks to urgent care.  Per chart review had episodes of colitis in the past in 2017 where patient underwent flex sig which was unremarkable at that time. No stool samples, but bacteremia with BCx with E.coli as above.- STD testing IP (HIV, Hep B/C, Chlamydia, Gonorrheae, Treponema- all negative) US pelvis on admission with no acute findings. CT Ab/Pelvis without contrast with colitis of cecum and ascending colon. No evidence of mesenteric ischemic on US doppler. Fat stranding and bowel thickening on initial CT do not correlate with rationale for initial critical illness as above. Repeat CT A/P with contrast undertaken on 6/5 given worsening abdominal pain, hypotension (likely due to medications).  See discussion above, likely cause of pain and sepsis due to E coli UTI based on findings on 6/5 CT, negative enteric panel, resolving bowel wall thickening. Suspect ongoing abdominal pain is also from vaginal infection (+ bacterial vaginosis, positive candida on vaginal swab), urinary tract infection with pyelonephritis, transient low flow state from  hypotension, mental health component given history of depression/PTSD, chronic pelvic pain, previously constipation now seems to have resolved.   - Mirlax BID, titrate up to TID per GI recs for 3 BMs weekly   - Bentryl 10 mg QID  - PRN Tucks, schedule prep H  - PRN simethicone, dicyclomine   - Will need GI and Colonoscopy OP, GI to arrange   - Pelvic floor PT OP consult     # Hypotension, resolved   Likely due to medications and loose stool given normalizing WBC, lnormal actic on 6/5. Improving with gentle fluid boluses, PO hydration and decreasing narcotics.     # MDD  # ADHD  # PTSD  Psychiatry and Health Psychology seen during stay given labile mood, chronic pain. Was previously on bupropion and mirtazapine, but no longer taking due to finances. PTA ativan 2mg TID PRN.   - Restart Ativan; schedule 2 mg BID, add 2 mg at bedtime for tonight  - Restart Remeron 30 mg   - Defer prazosin or clonidine as BP is low  - Start Wellbutrin (bupropion)  mg in the morning, can increase to 300 mg in a week as tolerated OP.   - Declined NRT replacement therapy   - PCP and OP mental health referrals at discharge    # Bacterial vulvovaginitis   - Abx as above, continue for 7-day course from 6/5    # Candida vulvovaginitis   - Fluconazole 150 mg x1, repeat after abx course x1 dose at discharge      # YOLANDA, prerenal, improving  See 2.61 on admission. See current trends below. Likely in the setting of septic shock and UTI as above. Improved with hydration and antibiotics. 1 at discharge.  - Trend BMP OP with PCP   - Treat infections as above   - Encourage PO intake     # Focal renal hypodensities, suspect possible cyst with hemorrhagic or proteinases contents   Seen on CT A/P Hgb relatively stable as below.   -Follow-up outpatient    # B/L pleural effusions  Seen on CT A/P. On RA. No shortness of breath. No cough, ongoing fevers, broad coverage throughout stay, so less likely ID and more likely due to fluid overload.  Repeat  chest x-ray on 6/7 with trace pleural effusions with overlying opacities. Less suspicious for pneumonia given no cough and new oxygen needs.  Procalcitonin greatly decreased from admission at 1.07 from 84.70. If this is a new or developing pneumonia, fluoroquinolones will cover. Discuss with CAPs team, not enough fluid to tap  - IS, ambulation   - Abx as above    # Normocytic anemia  No signs or symptoms of acute bleeding. Possible dilutional component with recent boluses in the setting of shock.   -Trend CBC OP     # Thrombocytopenia, mild   No signs or symptoms of acute bleeding. Possible dilutional component with recent boluses in the setting of shock.   -Trend CBC OP     # Hypokalemia  # Hypomagnesemia  Likely due to loose stool. RN replacement protocols during stay    #Asthma: without acute exacerbation      # Hyponatremia, mild, resolved   Likely due to poor PO intake    Consultations This Hospital Stay   PHARMACY TO DOSE VANCO  PHYSICAL THERAPY ADULT IP CONSULT  OCCUPATIONAL THERAPY ADULT IP CONSULT  NURSING TO CONSULT FOR VASCULAR ACCESS CARE IP CONSULT  GI LUMINAL ADULT IP CONSULT  NURSING TO CONSULT FOR VASCULAR ACCESS CARE IP CONSULT  CARE MANAGEMENT / SOCIAL WORK IP CONSULT  INTERNAL MEDICINE PROCEDURE TEAM ADULT IP CONSULT [facility] - THORACENTESIS  PSYCHOLOGY ADULT IP CONSULT  PSYCHIATRY IP CONSULT  CARE MANAGEMENT / SOCIAL WORK IP CONSULT  INFECTIOUS DISEASE GENERAL ADULT IP CONSULT  CARE MANAGEMENT / SOCIAL WORK IP CONSULT    Code Status   Full Code    Time Spent on this Encounter   I, BILLIE August CNP, personally saw the patient today and spent greater than 30 minutes discharging this patient.       BILLIE August CNP  [facility] 7C MED SURG  [address] [facility] [address]  Phone: [phone]  ______________________________________________________________________    Physical Exam   Vital Signs: Temp: 100.3  F (37.9  C) Temp src: Oral BP: 97/54 Pulse: 60   Resp: 16  SpO2: 98 % O2 Device: None (Room air)    Weight: 161 lbs 2.5 oz    General Appearance:  In mild acute distress in bed, clutching abdomen, but less than yesterday   Respiratory: LS clear b/l, normal RR  Cardiovascular: S1, S2, no m/r/g  GI: BS+, all 4 quadrants, no masses,- B/L flank tenderness, mild TTP throughout abdomen   Skin: Intact on face, arms, legs. No wounds, bruising, or lesions noted. Dirty fingernails   Neuropsych:A&Ox4, moving all extremities, labile mood      Primary Care Physician   [facility]    Discharge Orders       Significant Results and Procedures   Most Recent 3 CBC's:  Recent Labs   Lab Test 06/08/24  0549 06/07/24  0608 06/06/24  0623 06/05/24  0632   WBC 12.2* 11.4* 10.8 10.0   HGB 10.7* 9.4* 9.7* 9.5*   MCV 89 90 89 91   PLT  --  277 211 143*     Most Recent 3 BMP's:  Recent Labs   Lab Test 06/08/24  0549 06/07/24  0608 06/06/24  1551 06/06/24  0623    140  --  138   POTASSIUM 3.3* 3.7 3.9 3.2*   CHLORIDE 106 110*  --  108*   CO2 24 23  --  21*   BUN 6.6 6.6  --  6.7   CR 1.00* 1.05*  --  1.08*   ANIONGAP 9 7  --  9   JADE 8.9 8.9  --  8.5*   * 96  --  144*     Most Recent 2 LFT's:  Recent Labs   Lab Test 06/04/24  1411 06/04/24  0347   AST 24 38   ALT 25 36   ALKPHOS 87 101   BILITOTAL 0.6 0.7     Most Recent 3 INR's:  Recent Labs   Lab Test 06/05/24  1555 06/01/24  1201   INR 1.07 1.47*   ,   Results for orders placed or performed during the hospital encounter of 06/01/24   POC US ABDOMEN LIMITED    Impression    Bedside FAST (Focused Assessment with Sonography in Trauma), performed and interpreted by me.   Indication: Abdominal pain and Hypotension    With the patient in Trendelenburg, the RUQ, LUQ and subxiphoid views were examined for intraabdominal and thoracic free fluid and pericardial effusion. With the patient in reverse Trendelenburg, the suprapubic view was examined for intraabdominal free fluid. Image quality was satisfactory..     Findings:  There is no evidence of free fluid above or below bilateral diaphragms, in the splenorenal or hepatorenal space, or in bilateral paracolic gutters. There was no free fluid seen in the pelvis adjacent to the urinary bladder. There is no free fluid within the pericardium.        IMPRESSION:  Negative FAST    CT Abdomen Pelvis w/o Contrast    Narrative    EXAMINATION: CT ABDOMEN PELVIS W/O CONTRAST, 6/1/2024 12:57 PM    INDICATION: hypotension, lower abd pain, jordan    COMPARISON STUDY: None    TECHNIQUE: CT scan of the abdomen and pelvis was performed on  multidetector CT scanner using volumetric acquisition technique and  images were reconstructed in multiple planes with variable thickness  and reviewed on dedicated workstations.     CONTRAST: Without contrast.    CT scan radiation dose is optimized to minimum requisite dose using  automated dose modulation techniques.    FINDINGS:    Lower thorax: Trace pleural effusions with overlying subsegmental  bibasilar atelectasis. Left infrahilar pulmonary cyst.    Liver: Enlarged measuring 18.5 cm in craniocaudal dimension. No mass.  No intrahepatic biliary ductal dilation.    Biliary System: Status post cholecystectomy. No extrahepatic biliary  ductal dilation.    Pancreas: No pancreatic ductal dilation.    Adrenal glands: No mass or nodules    Spleen: Normal.    Kidneys: 2 mm left renal parenchymal calculus. No obstructing calculus  or hydronephrosis.    Gastrointestinal tract: Normal caliber bowel.  Edematous colonic bowel  wall thickening involving the cecum and ascending colon with adjacent  fat stranding and small amount of free fluid. The transverse and  descending colon appear unremarkable.    Mesentery/peritoneum/retroperitoneum: No free fluid or air.    Lymph nodes: No significant lymphadenopathy.    Vasculature: Nonaneurysmal abdominal aorta.    Pelvis: Urinary bladder is normal.    Osseous structures: No aggressive or acute osseous lesion.      Soft tissues:  Within normal limits.      Impression    IMPRESSION:   Examination is limited due to lack of IV contrast. Within the  limitations of the examination, findings are concerning for colitis  with bowel wall thickening and adjacent inflammatory changes involving  the cecum and ascending colon. Differential considerations include  infectious, inflammatory or possible ischemic etiology given history  of hypotension.    I have personally reviewed the examination and initial interpretation  and I agree with the findings.    RICARDO BAXTER MD         SYSTEM ID:  [id number]   US Pelvic Complete with Transvaginal    Narrative    EXAMINATION: US PELVIC TRANSABDOMINAL AND TRANSVAGINAL, 6/1/2024 5:19  PM     COMPARISON: 6/1/2024    HISTORY: concern for ovarian torsion    TECHNIQUE: The was scanned in standard fashion with transabdominal and  transvaginal transducer(s) using grey scale, color Doppler, and  spectral flow techniques.    FINDINGS:  Both ovaries are normal in size and there is normal blood flow to both  ovaries.  No evidence of an adnexal mass.  The right ovary measures  3.0 x 1.8 x 2.3 cm and the left ovary measures 3.6 x 1.8 x 2.8 cm.    The uterus measures 8.7 x 5.0 x 3.8 cm, and there is no evidence of a  focal fibroid. Endometrium was not well visualized. There is no free  fluid in the pelvis.      Impression    IMPRESSION: Normal pelvic ultrasound.  No sonographic evidence of  ovarian torsion.    I have personally reviewed the examination and initial interpretation  and I agree with the findings.    RICARDO BAXTER MD         SYSTEM ID:  [id number]   US Abdominal Doppler Complete    Narrative    ULTRASOUND ABDOMEN OR PELVIS ARTERIAL AND VENOUS DOPPLER 6/2/2024 3:40  PM    CLINICAL HISTORY: assess mesenteric arteries/veins r/o mesenteric  ischemia in the setting of pain out of proportion of the abdomen,  septic shock from E.coli bacteremia..     COMPARISONS: CT abdomen and pelvis without contrast  6/1/2024    REFERRING PROVIDER: FREDDIE FRANKLIN    TECHNIQUE: Aorta and mesenteric arteries evaluated with grayscale,  color Doppler, and spectral pulsed wave Doppler ultrasound.    Inferior vena cava, hepatic, portal, superior mesenteric, and splenic  veins evaluated with grayscale, color Doppler, and spectral pulsed  wave Doppler ultrasound.    FINDINGS:   Aorta, proximal: 2.0 cm, 149/0 cm/s, triphasic    Celiac artery, origin: 114/24 cm/s, arterial monophasic  Celiac artery, mid: 93/17 cm/s, arterial monophasic  Celiac artery, distal: 68/10 cm/s, arterial monophasic    Hepatic artery: 71/20 cm/s, arterial monophasic  Right hepatic artery: 34/10 cm/s, arterial monophasic  Left hepatic artery: 47/13 cm/s, arterial monophasic    Splenic vein: 10 cm/s, phasic, 12 cm/s, phasic  Superior mesenteric vein: 22 cm/s, phasic    Main portal vein: 29 cm/s, phasic, antegrade  Right portal vein: 20 cm/s, phasic, antegrade  Left portal vein: 20 cm/s, phasic, antegrade    Right hepatic vein: 38 cm/s, antegrade  Middle hepatic vein: 50 cm/s, antegrade  Left hepatic vein: 50 cm/s, antegrade    Inferior vena cava: 2.8 cm, 37 cm/s, phasic    Superior mesenteric artery, origin: 151/22 cm/s, triphasic  Superior mesenteric artery, mid: 162/0 cm/s, triphasic  Superior mesenteric artery, distal: 124/0 cm/s, biphasic    Aorta, below superior mesenteric artery: 107/0 cm/s, triphasic      Impression    IMPRESSION: Inferior mesenteric artery not evaluated. Otherwise  negative study.    I have personally reviewed the examination and initial interpretation  and I agree with the findings.    LINDA WASHINGTON MD         SYSTEM ID:  [id number]   CT Abdomen Pelvis w Contrast     Value    Radiologist flags Concern for right-sided pyelonephritis    Narrative    EXAMINATION: CT ABDOMEN PELVIS W CONTRAST, 6/4/2024 3:56 PM    INDICATION: Ongoing, constant abdominal pain with loose stool and  E.coli colitis, assess for interval changes    COMPARISON STUDY:  CT abdomen and pelvis 6/1/2024    TECHNIQUE: CT scan of the abdomen and pelvis was performed on  multidetector CT scanner using volumetric acquisition technique and  images were reconstructed in multiple planes with variable thickness  and reviewed on dedicated workstations.     CONTRAST: 59 mL Isovue injected IV without oral contrast    CT scan radiation dose is optimized to minimum requisite dose using  automated dose modulation techniques.    FINDINGS:    Lower chest: Bilateral pleural effusions, right more than left,  increased compared to prior. Right basilar atelectasis. No pericardial  effusion. Patchy groundglass densities in the lung bases, right more  than left.    Abdomen and pelvis:    Hepatobiliary: Mild hepatomegaly. Geographic area of hypoattenuation  adjacent to the falciform ligament, favor focal fatty infiltration.  Portal vein is patent. Gallbladder is surgically absent. Mild  periportal edema, subtle heterogenous hepatic enhancement.    No focal pancreatic mass or pancreatic ductal dilatation. Spleen is  not enlarged.    Right kidney appears larger than left measuring 12.5 cm compared to  9.9 cm. Heterogeneous enhancement of the right kidney, with multifocal  areas of subtle hypoattenuation and few areas of ill marginated  hypodensity for example along the superior pole (6/36). Mild  right-sided hydronephrosis with increased urothelial enhancement.  Focal hypoattenuation in the posterior midpole of the right kidney  (4/96), measuring 1.4 cm and additional hypodensity seen in the left  kidney with adjacent calcification.. Urinary bladder is partially  distended.     No high-grade bowel obstruction. Hyperdense contents in the cecum. No  significant bowel wall thickening or pneumatosis. No pneumoperitoneum.  No new significant enlarged lymphadenopathy. Few prominent inguinal  lymph nodes, likely reactive.      Free fluid in the pelvis. Uterus within normal limits. No pelvic  mass... No aggressive  osseous lesion. Increased dependent fatty  streakiness in the subcutaneous posterior abdominal wall midline,  possibly congestive.        Impression    Impression:    1. Right kidney is larger than left with multifocal patchy areas of  hypodensity involving the right kidney, mild right-sided  hydronephrosis with urothelial enhancement with  few ill marginated  areas of peripheral hypodensity, findings suggest infiltrative  etiology including pyelonephritis with possible foci of focal  nephronia. Recommend correlation with urinalysis and consider imaging  follow-up to resolution to exclude less likely infiltrative neoplastic  etiology.  2. Focal renal circumscribed hypodensities with intermediate  attenuation, possibly cysts with hemorrhagic or proteinaceous contents  may consider ultrasound for evaluation.  3. Hepatomegaly with mildly heterogeneous appearance of the liver with  periportal edema may represent congestive or inflammatory changes.  4. Increased bilateral effusion, right more than left compared to  prior CT from 6/1/2024.  5. Increased ascites compared to CT from 6/1/2024.  6. No high-grade bowel obstruction, pneumatosis or significant bowel  wall thickening.  7. Patchy groundglass density in the lung bases right more than left  may represent atelectasis, pulmonary edema or infection/inflammation.        [Recommend Follow Up: Concern for right-sided pyelonephritis]    This report will be copied to the [facility] to ensure a  provider acknowledges the finding. [facility] is available Monday  through Friday 8am-3:30 pm.                      I have personally reviewed the examination and initial interpretation  and I agree with the findings.    CARMEN FRANCIS MD         SYSTEM ID:  [id number]   POC US Guide for Paracentesis    Impression    US Indication: abdominal distension    Limited abdominal ultrasound was performed to evaluate for ascites and need for paracentesis.      Views/Acquisition: hepatorenal/RUQ, right lower quadrant, parasplenic/LUQ, and left lower quadrant    Findings/Interpretation: No significant ascites    Estelita Mcgregor MD    XR Chest 2 Views    Narrative    XR CHEST 2 VIEWS  6/7/2024 10:52 AM     HISTORY:  Fever through broad spectrum abx, RLL crackles, assess for  infection       COMPARISON:  None.     TECHNIQUE: Upright PA and lateral projection radiographs of the chest    FINDINGS:   Trachea is midline. Cardiomediastinal silhouette is within normal  limits. Trace bilateral pleural effusion with overlying streaky and  patchy opacities. No pneumothorax. Normal osseous structures.  Visualized upper abdomen is unremarkable.        Impression    IMPRESSION:  Trace pleural effusions with overlying opacities, suspicious for  pneumonia with parapneumonic effusion.    I have personally reviewed the examination and initial interpretation  and I agree with the findings.    DANIA VANCE MD         SYSTEM ID:  [id number]   XR Abdomen 2 Views    Narrative    XR ABDOMEN 2 VIEWS  6/7/2024 10:51 AM     HISTORY:  Ongoing abdominal pain, fevers, bowel movements, proper tx  for UTI, assess for SBO     COMPARISON:  6/4/2024 CT abdomen/pelvis    TECHNIQUE: Supine and upright views of the abdomen.     FINDINGS:   Surgical clips project over the right upper quadrant presumably from a  prior cholecystectomy.    Mild gaseous distention of large bowel loops. There are no abnormal  calcifications or evidence of organomegaly. Benign pelvic phleboliths.  No significant stool burden in the colon. The visualized lung bases  are clear.        Impression    IMPRESSION:   Mild gaseous distention of large bowel loops is nonspecific. No  radiographic evidence for small bowel obstruction.      I have personally reviewed the examination and initial interpretation  and I agree with the findings.    THELMA OCHOA DO         SYSTEM ID:  [id number]   Echo Complete     Value    LVEF  60-65%     Narrative    [id number]  [id number]  [id number]  [id number]^MANISHA^BEN     [facility],[place]  Echocardiography Laboratory  [address] [place] [id number]     Name: ROBER MARCIAL  MRN: [id number]  : 1980  Study Date: 2024 08:18 AM  Age: 43 yrs  Gender: Female  [facility]  Reason For Study: Heart Failure  Ordering Physician: BEN DYER  Performed By: Nick Duran RDCS     BSA: 1.7 m2  Height: 58 in  Weight: 160 lb  HR: 78  BP: 82/56 mmHg  ______________________________________________________________________________  Procedure  Complete Portable Echo Adult.  ______________________________________________________________________________  Interpretation Summary  Left ventricular size, wall motion and function are normal. The ejection  fraction is 60-65%.  Left ventricular diastolic function is normal.  Right ventricular function, chamber size, wall motion, and thickness are  normal.  Dilation of the inferior vena cava is present with abnormal respiratory  variation in diameter.  There is no prior study for direct comparison.  ______________________________________________________________________________  Left Ventricle  Left ventricular size, wall motion and function are normal. The ejection  fraction is 60-65%. Left ventricular diastolic function is normal.     Right Ventricle  Right ventricular function, chamber size, wall motion, and thickness are  normal.     Atria  The right atria appears normal. Mild left atrial enlargement is present.     Mitral Valve  The mitral valve is normal. Trace mitral insufficiency is present.     Aortic Valve  Aortic valve is normal in structure and function. The aortic valve is  tricuspid.     Tricuspid Valve  The tricuspid valve is normal. Trace tricuspid insufficiency is present.  Pulmonary artery systolic pressure is normal.     Pulmonic Valve  The pulmonic valve is normal. Trace pulmonic  insufficiency is present.     Vessels  Normal diameter aortic root and proximal ascending aorta. Dilation of the  inferior vena cava is present with abnormal respiratory variation in diameter.     Pericardium  No pericardial effusion is present.     Compared to Previous Study  There is no prior study for direct comparison.  ______________________________________________________________________________  MMode/2D Measurements & Calculations     IVSd: 0.95 cm  LVIDd: 4.2 cm  LVIDs: 2.8 cm  LVPWd: 0.94 cm  FS: 34.0 %  LV mass(C)d: 128.3 grams  LV mass(C)dI: 77.5 grams/m2  asc Aorta Diam: 3.0 cm  Asc Ao diam index BSA (cm/m2): 1.8  Asc Ao diam index Ht(cm/m): 2.0  LA Volume Index (BP): 32.6 ml/m2  RWT: 0.45  TAPSE: 2.3 cm     Doppler Measurements & Calculations  MV E max zoë: 106.6 cm/sec  MV A max zoë: 63.5 cm/sec  MV E/A: 1.7  MV dec slope: 547.1 cm/sec2  MV dec time: 0.19 sec  PA acc time: 0.07 sec  E/E' av.9  Lateral E/e': 7.9  Medial E/e': 11.9     ______________________________________________________________________________  Report approved by: Puneet Regan 2024 11:36 AM             Discharge Medications   Discharge Medication List as of 2024 12:12 PM        START taking these medications    Details   acetaminophen (TYLENOL) 325 MG tablet Take 3 tablets (975 mg) by mouth every 8 hours as needed for mild pain, fever or headaches, OTC      albuterol (PROAIR HFA/PROVENTIL HFA/VENTOLIN HFA) 108 (90 Base) MCG/ACT inhaler Inhale 2 puffs into the lungs every 6 hours as needed for wheezing, Disp-18 g, R-0, E-PrescribePharmacy may dispense brand covered by insurance (Proair, or proventil or ventolin or generic albuterol inhaler)      buPROPion (WELLBUTRIN XL) 150 MG 24 hr tablet Take 1 tablet (150 mg) by mouth daily for 45 days, Disp-45 tablet, R-0, E-Prescribe      dicyclomine (BENTYL) 10 MG capsule Take 1 capsule (10 mg) by mouth 4 times daily for 45 days, Disp-180 capsule, R-0, E-Prescribe       fluconazole (DIFLUCAN) 150 MG tablet Take 1 tablet (150 mg) by mouth once for 1 dose Take after you stop your antibiotics on 6/19 to prevent a yeast infection., Disp-1 tablet, R-0, E-Prescribe      HYDROmorphone (DILAUDID) 2 MG tablet Take 1-2 tablets (2-4 mg) by mouth every 8 hours as needed for moderate pain, Disp-18 tablet, R-0, E-Prescribe      ibuprofen (ADVIL/MOTRIN) 200 MG tablet Take 1-2 tablets (200-400 mg) by mouth every 6 hours as needed for inflammatory pain or moderate pain Take the lowest dose as able as your kidney function recovers, OTC      levofloxacin (LEVAQUIN) 750 MG tablet Take 1 tablet (750 mg) by mouth every 24 hours for 11 days Administer at least 2 hours before or 4 hours after aluminum, calcium, iron, zinc or magnesium containing medications. May be taken with food or on an empty stomach.  Do not administer alone with  a dairy product like milk or yogurt or calcium-fortified juice,  but may be administered with a meal containing dairy. Hold tube feeding 1 hour before and 1 hour after administration, Disp-11 tablet, R-0, E-Prescribe      metroNIDAZOLE (FLAGYL) 500 MG tablet Take 1 tablet (500 mg) by mouth 2 times daily for 4 days, Disp-8 tablet, R-0, E-Prescribe      pramox-pe-glycerin-petrolatum (PREPARATION H) 1-0.25-14.4-15 % CREA cream Place rectally 3 times dailyDisp-1092 g, [id number]      simethicone (MYLICON) 80 MG chewable tablet Take 1 tablet (80 mg) by mouth every 6 hours as needed for cramping, OTC      witch hazel-glycerin (TUCKS) pad Apply topically every hour as needed for hemorrhoidsR-[id number]           CONTINUE these medications which have CHANGED    Details   LORazepam (ATIVAN) 2 MG tablet Take 1 tablet (2 mg) by mouth 3 times daily as needed for anxiety, Disp-60 tablet, R-0, E-Prescribe      mirtazapine (REMERON) 30 MG tablet Take 1 tablet (30 mg) by mouth at bedtime for 45 days, Disp-45 tablet, R-0, E-Prescribe      polyethylene glycol (MIRALAX) 17 GM/Dose powder Take  17 g (1 Capful) by mouth daily Continue daily Maintenance Bowel Regimen to treat and prevent further constipation:- Start with Miralax 1 capful per day to titrate to 1-2 soft bowel movements - Move to Miralax 2-3 capfuls daily, titrated to promote 1 -2 soft, continent, easy to evacuate bowel movements daily (minimum 3 bowel movements weekly). - If no bowel movement after 3 days, recommend increasing Miralax 2 additional capfuls and reach out to your primary care provider., Disp-17 g, R-0, E-Prescrib e           STOP taking these medications       buPROPion (WELLBUTRIN SR) 150 MG 12 hr tablet Comments:   Reason for Stopping:         cloNIDine (CATAPRES) 0.1 MG tablet Comments:   Reason for Stopping:         prazosin (MINIPRESS) 2 MG capsule Comments:   Reason for Stopping:             Allergies   Allergies   Allergen Reactions    Codeine Hives    Meperidine Hives    Ceftriaxone Rash    Ciprofloxacin Headache    Hydrocodone-Acetaminophen Nausea and Vomiting    Nalbuphine Nausea and Vomiting    Tylenol [Acetaminophen] Nausea and Vomiting

## 2024-06-08 NOTE — PLAN OF CARE
Goal Outcome Evaluation:      Plan of Care Reviewed With: patient, parent    Overall Patient Progress: no changeOverall Patient Progress: no change    Outcome Evaluation: Pt alert and oriented x4. Flat affect. C/o abdominal pain. PRN dilaudid administered with minimal effect. Declined ibuprofen. Requested ativan. Declined a few scheduled medications. Declined skin assessment. Up to BR independently. Reported BM today- described as diarrhea. IV antibiotics changed to orals. Plan to discharge home tomorrow if no further fevers.Tmax this evening 99.7.

## 2024-06-08 NOTE — CONSULTS
SW acknowledging IP CM/SW Consult order; CM team consulted re: financial/insurance. Financial counseling department referral was made. No financial counselors available over the weekend.     Shahida Hagen, [facility]   6/8/2024       Social Work and Care Management Department       SEARCHABLE in [facility] - search SOCIAL WORK       [place] (0800 - 1630) Saturday and Sunday     Units: 4A Vocera, 4C Vocera, & 4E Vocera        Units: 5A 2712-5311 Vocera, 5A 8250-2253 Vocera , BMT SW 1 BMT SW 2, BMT SW 3 & BMT SW 4  5C Off Service 5401 - 5416  5C Off Service [id number]     Units: 6A Vocera & 6B Vocera      Units: 6C Vocera     Units: 7A Vocera & 7B Vocera      Units: 7C Med Surg 7401 thru 7418 and 7C Med Surg 7502 thru 7521      Unit: [place] ED Vocera & [place] Obs Vocera     [facility] (6444-2854) Saturday and Sunday      Units: 5 Ortho Vocera, 5 Med Surg Vocera & WB ED Vocera     Units: 6 Med Surg Vocera, 8 Med Surg Vocera, & 10 ICU Vocera      After hours Vocera [facility] and After Hours Vocera [place]     Saturday & Sunday (1630 - 0000)    Mon-Fri (3359-0431)     [facility] Recognized Holidays  ([id number])    Units: ALL   - see above VOCERA links to units and after hours

## 2024-06-10 ENCOUNTER — TELEPHONE (OUTPATIENT)
Dept: GASTROENTEROLOGY | Facility: CLINIC | Age: 44
End: 2024-06-10

## 2024-06-10 NOTE — TELEPHONE ENCOUNTER
[facility] Call Center    Phone Message    May a detailed message be left on voicemail: Yes    Reason for Call: Other: Patient is currently scheduled on 07/31/2024, as visit type New GI Urgent. This is outside the expected timeline for this referral. Patient has been added to the waitlist.      Action Taken: Message routed to:  Other: GI REFERRAL TRIAGE POOL     Travel Screening: Not Applicable

## 2024-06-10 NOTE — CARE PLAN
Occupational Therapy Discharge Summary    Reason for therapy discharge:    Discharged to home.    Progress towards therapy goal(s). See goals on Care Plan in [facility] electronic health record for goal details.  Goals partially met.  Barriers to achieving goals:   discharge from facility.    Therapy recommendation(s):    Continue home exercise program.

## 2024-06-11 ENCOUNTER — PATIENT OUTREACH (OUTPATIENT)
Dept: CARE COORDINATION | Facility: CLINIC | Age: 44
End: 2024-06-11

## 2024-06-11 LAB — BACTERIA BLD CULT: NO GROWTH

## 2024-06-11 NOTE — PROGRESS NOTES
Rn briefly spoke to patient. RN woke patient up with call, patient requests call back this afternoon.     Yoselyn Beavers RN 06/11/24 11:22 AM   - Ambulatory Care Management

## 2024-06-11 NOTE — PROGRESS NOTES
Connected Care Resource Center Contact  [facility]/Voicemail     Clinical Data: Post-Discharge Outreach     Outreach attempted x 3.  Left message on patient's voicemail, providing [facility]'s central phone number of [phone] ([phone]) for questions/concerns and/or to schedule an appt with an [facility] provider, if they do not have a PCP.      Plan:  [facility] will do no further outreaches at this time.       Jacey Johns RN  Connected Care Resource Center, [facility]    *Connected Care Resource Team does NOT follow patient ongoing. Referrals are identified based on internal discharge reports and the outreach is to ensure patient has an understanding of their discharge instructions.

## 2024-06-12 LAB — BACTERIA BLD CULT: NO GROWTH

## 2024-06-18 NOTE — TELEPHONE ENCOUNTER
Clinic Navigator sent a [facility]opi message to inform the Pt that Pt is on the wait list for a sooner appointment. Clinic Navigator will reach out to the Pt via phone call or [email or web address] when a sooner appointment becomes available.

## 2024-06-23 ENCOUNTER — HEALTH MAINTENANCE LETTER (OUTPATIENT)
Age: 44
End: 2024-06-23

## 2024-06-27 ENCOUNTER — HOSPITAL ENCOUNTER (INPATIENT)
Facility: CLINIC | Age: 44
LOS: 1 days | Discharge: HOME | DRG: 392 | End: 2024-06-28
Attending: EMERGENCY MEDICINE | Admitting: HOSPITALIST

## 2024-06-27 ENCOUNTER — APPOINTMENT (OUTPATIENT)
Dept: CT IMAGING | Facility: CLINIC | Age: 44
DRG: 392 | End: 2024-06-27
Attending: EMERGENCY MEDICINE

## 2024-06-27 DIAGNOSIS — R93.5 ABNORMAL CT OF THE ABDOMEN: ICD-10-CM

## 2024-06-27 DIAGNOSIS — R10.11 RIGHT UPPER QUADRANT PAIN: ICD-10-CM

## 2024-06-27 DIAGNOSIS — R93.421 ABNORMAL FINDING ON DIAGNOSTIC IMAGING OF RIGHT KIDNEY: Primary | ICD-10-CM

## 2024-06-27 DIAGNOSIS — Z86.19 HISTORY OF SEPSIS: ICD-10-CM

## 2024-06-27 LAB
ALBUMIN SERPL BCG-MCNC: 4 G/DL (ref 3.5–5.2)
ALP SERPL-CCNC: 61 U/L (ref 40–150)
ALT SERPL W P-5'-P-CCNC: 7 U/L (ref 0–50)
ANION GAP SERPL CALCULATED.3IONS-SCNC: 9 MMOL/L (ref 7–15)
AST SERPL W P-5'-P-CCNC: 17 U/L (ref 0–45)
ATRIAL RATE - MUSE: 54 BPM
BASOPHILS # BLD AUTO: 0 10E3/UL (ref 0–0.2)
BASOPHILS NFR BLD AUTO: 0 %
BILIRUB SERPL-MCNC: 0.2 MG/DL
BUN SERPL-MCNC: 10 MG/DL (ref 6–20)
CALCIUM SERPL-MCNC: 9.5 MG/DL (ref 8.6–10)
CHLORIDE SERPL-SCNC: 103 MMOL/L (ref 98–107)
CREAT SERPL-MCNC: 0.98 MG/DL (ref 0.51–0.95)
DEPRECATED HCO3 PLAS-SCNC: 25 MMOL/L (ref 22–29)
DIASTOLIC BLOOD PRESSURE - MUSE: NORMAL MMHG
EGFRCR SERPLBLD CKD-EPI 2021: 74 ML/MIN/1.73M2
EOSINOPHIL # BLD AUTO: 0.3 10E3/UL (ref 0–0.7)
EOSINOPHIL NFR BLD AUTO: 5 %
ERYTHROCYTE [DISTWIDTH] IN BLOOD BY AUTOMATED COUNT: 13.2 % (ref 10–15)
GLUCOSE SERPL-MCNC: 117 MG/DL (ref 70–99)
HCG SERPL QL: NEGATIVE
HCT VFR BLD AUTO: 33.9 % (ref 35–47)
HGB BLD-MCNC: 10.9 G/DL (ref 11.7–15.7)
IMM GRANULOCYTES # BLD: 0 10E3/UL
IMM GRANULOCYTES NFR BLD: 0 %
INTERPRETATION ECG - MUSE: NORMAL
LACTATE SERPL-SCNC: 0.9 MMOL/L (ref 0.7–2)
LIPASE SERPL-CCNC: 27 U/L (ref 13–60)
LYMPHOCYTES # BLD AUTO: 2.2 10E3/UL (ref 0.8–5.3)
LYMPHOCYTES NFR BLD AUTO: 33 %
MCH RBC QN AUTO: 29.1 PG (ref 26.5–33)
MCHC RBC AUTO-ENTMCNC: 32.2 G/DL (ref 31.5–36.5)
MCV RBC AUTO: 90 FL (ref 78–100)
MONOCYTES # BLD AUTO: 0.7 10E3/UL (ref 0–1.3)
MONOCYTES NFR BLD AUTO: 10 %
NEUTROPHILS # BLD AUTO: 3.3 10E3/UL (ref 1.6–8.3)
NEUTROPHILS NFR BLD AUTO: 52 %
NRBC # BLD AUTO: 0 10E3/UL
NRBC BLD AUTO-RTO: 0 /100
P AXIS - MUSE: 52 DEGREES
PLATELET # BLD AUTO: 296 10E3/UL (ref 150–450)
POTASSIUM SERPL-SCNC: 3.7 MMOL/L (ref 3.4–5.3)
PR INTERVAL - MUSE: 146 MS
PROT SERPL-MCNC: 7.2 G/DL (ref 6.4–8.3)
QRS DURATION - MUSE: 90 MS
QT - MUSE: 422 MS
QTC - MUSE: 400 MS
R AXIS - MUSE: -5 DEGREES
RBC # BLD AUTO: 3.75 10E6/UL (ref 3.8–5.2)
SODIUM SERPL-SCNC: 137 MMOL/L (ref 135–145)
SYSTOLIC BLOOD PRESSURE - MUSE: NORMAL MMHG
T AXIS - MUSE: 34 DEGREES
TROPONIN T SERPL HS-MCNC: <6 NG/L
VENTRICULAR RATE- MUSE: 54 BPM
WBC # BLD AUTO: 6.5 10E3/UL (ref 4–11)

## 2024-06-27 PROCEDURE — 93005 ELECTROCARDIOGRAM TRACING: CPT | Mod: 76 | Performed by: EMERGENCY MEDICINE

## 2024-06-27 PROCEDURE — 84484 ASSAY OF TROPONIN QUANT: CPT | Performed by: EMERGENCY MEDICINE

## 2024-06-27 PROCEDURE — 250N000011 HC RX IP 250 OP 636: Performed by: EMERGENCY MEDICINE

## 2024-06-27 PROCEDURE — 84703 CHORIONIC GONADOTROPIN ASSAY: CPT | Performed by: EMERGENCY MEDICINE

## 2024-06-27 PROCEDURE — 84145 PROCALCITONIN (PCT): CPT

## 2024-06-27 PROCEDURE — 71275 CT ANGIOGRAPHY CHEST: CPT | Mod: 26 | Performed by: RADIOLOGY

## 2024-06-27 PROCEDURE — 86140 C-REACTIVE PROTEIN: CPT

## 2024-06-27 PROCEDURE — 36415 COLL VENOUS BLD VENIPUNCTURE: CPT | Performed by: EMERGENCY MEDICINE

## 2024-06-27 PROCEDURE — 83605 ASSAY OF LACTIC ACID: CPT | Performed by: EMERGENCY MEDICINE

## 2024-06-27 PROCEDURE — 96361 HYDRATE IV INFUSION ADD-ON: CPT | Performed by: EMERGENCY MEDICINE

## 2024-06-27 PROCEDURE — 99285 EMERGENCY DEPT VISIT HI MDM: CPT | Performed by: EMERGENCY MEDICINE

## 2024-06-27 PROCEDURE — 81001 URINALYSIS AUTO W/SCOPE: CPT | Performed by: EMERGENCY MEDICINE

## 2024-06-27 PROCEDURE — 93010 ELECTROCARDIOGRAM REPORT: CPT | Performed by: EMERGENCY MEDICINE

## 2024-06-27 PROCEDURE — 74177 CT ABD & PELVIS W/CONTRAST: CPT

## 2024-06-27 PROCEDURE — 80053 COMPREHEN METABOLIC PANEL: CPT | Performed by: EMERGENCY MEDICINE

## 2024-06-27 PROCEDURE — 96374 THER/PROPH/DIAG INJ IV PUSH: CPT | Performed by: EMERGENCY MEDICINE

## 2024-06-27 PROCEDURE — 85025 COMPLETE CBC W/AUTO DIFF WBC: CPT | Performed by: EMERGENCY MEDICINE

## 2024-06-27 PROCEDURE — 87086 URINE CULTURE/COLONY COUNT: CPT | Performed by: EMERGENCY MEDICINE

## 2024-06-27 PROCEDURE — 93005 ELECTROCARDIOGRAM TRACING: CPT | Performed by: EMERGENCY MEDICINE

## 2024-06-27 PROCEDURE — 93010 ELECTROCARDIOGRAM REPORT: CPT | Mod: 76 | Performed by: EMERGENCY MEDICINE

## 2024-06-27 PROCEDURE — 83690 ASSAY OF LIPASE: CPT | Performed by: EMERGENCY MEDICINE

## 2024-06-27 PROCEDURE — 99285 EMERGENCY DEPT VISIT HI MDM: CPT | Mod: 25 | Performed by: EMERGENCY MEDICINE

## 2024-06-27 PROCEDURE — 258N000003 HC RX IP 258 OP 636: Performed by: EMERGENCY MEDICINE

## 2024-06-27 PROCEDURE — 250N000013 HC RX MED GY IP 250 OP 250 PS 637: Performed by: EMERGENCY MEDICINE

## 2024-06-27 PROCEDURE — 74177 CT ABD & PELVIS W/CONTRAST: CPT | Mod: 26 | Performed by: RADIOLOGY

## 2024-06-27 RX ORDER — OXYCODONE HYDROCHLORIDE 5 MG/1
5 TABLET ORAL ONCE
Status: COMPLETED | OUTPATIENT
Start: 2024-06-27 | End: 2024-06-27

## 2024-06-27 RX ORDER — HYDROMORPHONE HYDROCHLORIDE 1 MG/ML
.5-1 INJECTION, SOLUTION INTRAMUSCULAR; INTRAVENOUS; SUBCUTANEOUS ONCE
Status: COMPLETED | OUTPATIENT
Start: 2024-06-27 | End: 2024-06-27

## 2024-06-27 RX ORDER — IOPAMIDOL 755 MG/ML
99 INJECTION, SOLUTION INTRAVASCULAR ONCE
Status: COMPLETED | OUTPATIENT
Start: 2024-06-27 | End: 2024-06-27

## 2024-06-27 RX ADMIN — SODIUM CHLORIDE 1000 ML: 9 INJECTION, SOLUTION INTRAVENOUS at 17:25

## 2024-06-27 RX ADMIN — IOPAMIDOL 99 ML: 755 INJECTION, SOLUTION INTRAVENOUS at 19:24

## 2024-06-27 RX ADMIN — HYDROMORPHONE HYDROCHLORIDE 1 MG: 1 INJECTION, SOLUTION INTRAMUSCULAR; INTRAVENOUS; SUBCUTANEOUS at 22:55

## 2024-06-27 RX ADMIN — OXYCODONE HYDROCHLORIDE 5 MG: 5 TABLET ORAL at 17:26

## 2024-06-27 ASSESSMENT — ACTIVITIES OF DAILY LIVING (ADL)
ADLS_ACUITY_SCORE: 38

## 2024-06-27 NOTE — ED PROVIDER NOTES
[facility] ([facility])    6/27/24       ED PROVIDER NOTE    History     Chief Complaint   Patient presents with    Rib Pain     HPI  Alisha Crowe is a 43 year old female with a past medical history of septic shock, chronic constipation, fibromyalgia, congential heart disease, chronic pelvic pain (previously on chronic oxycodone), episodes of colitis, and menometrorrhagia, who presents to the ED for evaluation of abdominal pain.     Patient was admitted on 6/1/24 to 6/8/24 from Urgent Care for septic shock and found to have e coli bacteremia and colitis.  Imaging done during that hospitalization also demonstrated right-sided hydronephrosis.  UA did demonstrate E. coli UTI and patient was appropriately treated.  She reports that she did complete her entire course of antibiotics.    For the past 4 days she reports acute onset of right-sided rib pain.  Pain is located in the lower sternum and wraps underneath the right breast around to the right lateral rib cage and posteriorly as well.  It is much worse with movement.  It has been ongoing for the past 4 days.  She has taken ibuprofen without improvement.  She denies fevers or chills, no nasal congestion or sore throat.  She has had a very rare cough which is dry nonproductive.  She denies shortness of breath.  Denies abdominal pain, denies nausea, vomiting, or diarrhea.  Denies any dysuria or hematuria.  No leg pain or leg swelling.  No rash.    She is status post cholecystectomy, has also had C-sections and various gynecologic surgeries.    She did drive herself here today, but reports that her  is on his way.  She did go to urgent care first and was advised to come to the emergency department as they were concerned for the need for more expanded workup which was not able to be done in urgent care setting.    History reviewed. No pertinent past medical history.    No past surgical history on file.    No family history on  "file.    Social History     Tobacco Use    Smoking status: Not on file    Smokeless tobacco: Not on file   Substance Use Topics    Alcohol use: Not on file           Past Medical History  History reviewed. No pertinent past medical history.  No past surgical history on file.  acetaminophen (TYLENOL) 325 MG tablet  albuterol (PROAIR HFA/PROVENTIL HFA/VENTOLIN HFA) 108 (90 Base) MCG/ACT inhaler  buPROPion (WELLBUTRIN XL) 150 MG 24 hr tablet  dicyclomine (BENTYL) 10 MG capsule  ibuprofen (ADVIL/MOTRIN) 200 MG tablet  LORazepam (ATIVAN) 2 MG tablet  mirtazapine (REMERON) 30 MG tablet  polyethylene glycol (MIRALAX) 17 GM/Dose powder  pramox-pe-glycerin-petrolatum (PREPARATION H) 1-0.25-14.4-15 % CREA cream  simethicone (MYLICON) 80 MG chewable tablet  witch hazel-glycerin (TUCKS) pad      Allergies   Allergen Reactions    Codeine Hives    Meperidine Hives    Ceftriaxone Rash    Ciprofloxacin Headache    Hydrocodone-Acetaminophen Nausea and Vomiting    Nalbuphine Nausea and Vomiting    Tylenol [Acetaminophen] Nausea and Vomiting     Family History  No family history on file.  Social History       A medically appropriate review of systems was performed with pertinent positives and negatives noted in the HPI, and all other systems negative.    Physical Exam   BP: 96/52  Pulse: 82  Temp: 98  F (36.7  C)  Resp: 18  Height: 148.6 cm (4' 10.5\")  Weight: 70.3 kg (155 lb)  SpO2: 98 %  Physical Exam  Vitals and nursing note reviewed.   Constitutional:       General: She is in acute distress.      Appearance: She is not diaphoretic.      Comments: Adult female, alert, cooperative, slightly tearful, distressed   HENT:      Head: Atraumatic.      Mouth/Throat:      Mouth: Mucous membranes are moist.      Pharynx: Oropharynx is clear. No oropharyngeal exudate.   Eyes:      General: No scleral icterus.     Pupils: Pupils are equal, round, and reactive to light.   Cardiovascular:      Rate and Rhythm: Normal rate.      Pulses: Normal " pulses.      Heart sounds: Normal heart sounds. No murmur heard.  Pulmonary:      Effort: No respiratory distress.      Breath sounds: Normal breath sounds. No wheezing or rhonchi.      Comments: Breath sounds clear, no rhonchi crackles or wheezing appreciated.  Somewhat diminished breath sounds bilaterally.  No respiratory distress.  Abdominal:      General: Bowel sounds are normal.      Palpations: Abdomen is soft.      Tenderness: There is no abdominal tenderness. There is no guarding or rebound.      Comments: No focal abdominal tenderness to palpation.  There is very focal tenderness to palpation over inferior right rib anteriorly and laterally.   Musculoskeletal:         General: No tenderness.      Right lower leg: No edema.      Left lower leg: No edema.   Skin:     General: Skin is warm.      Findings: No rash.   Neurological:      Mental Status: She is alert.         ED Course, Procedures, & Data      Procedures            EKG Interpretation:      Interpreted by Radha Servin MD  Time reviewed:1725   Symptoms at time of EKG: R ribcage pain   Rhythm: Sinus bradycardia with sinus arrhythmia  Rate: 50-60  Axis: Normal  Ectopy: None  Conduction: Normal  ST Segments/ T Waves: No ST-T wave changes and No acute ischemic changes  Q Waves: None  Comparison to prior: Unchanged    Clinical Impression: sinus bradycardia         EKG#2 Interpretation:      Interpreted by Radha Servin MD  Time reviewed:2345   Symptoms at time of EKG: bradycardia   Rhythm: Sinus bradycardia  Rate: 40-50  Axis: Normal  Ectopy: None  Conduction: Normal  ST Segments/ T Waves: No ST-T wave changes and No acute ischemic changes  Q Waves: None  Comparison to prior: Unchanged    Clinical Impression: sinus bradycardia            Results for orders placed or performed during the hospital encounter of 06/27/24   CT Chest (PE) Abdomen Pelvis w Contrast     Status: None    Narrative    EXAMINATION: CT CHEST PE ABDOMEN PELVIS W CONTRAST,  6/27/2024 7:49 PM    INDICATION: severe R sided chest/rib/abdominal pain; recent hx of  septic shock/pyelonephritis, eval for PE, pneumonia, or acute  intraabdominal process    COMPARISON STUDY: CT abdomen and pelvis 6/4/2024    TECHNIQUE: CT scan of the chest, abdomen and pelvis was performed on  multidetector CT scanner using volumetric acquisition technique and  images were reconstructed in multiple planes with variable thickness  and reviewed on dedicated workstations.     CONTRAST: [id number].     CT scan radiation dose is optimized to minimum requisite dose using  automated dose modulation techniques.    FINDINGS:    Chest:   Mediastinum: No thyroid nodules. Cardiac size is within normal limits.  No filling defect in the pulmonary arteries. Reflux of contrast into  the IVC and hepatic veins.     Pleura: No pleural effusion or pneumothorax.     Lungs: Central tracheobronchial tree is patent. No consolidation. Mild  ground glass opacities in the lung bases, likely atelectasis.    Abdomen and Pelvis:  Liver: No mass. No intrahepatic biliary ductal dilation.     Biliary System: Gallbladder surgically absent.    Spleen: Variable enhancement which may be due to contrast timing.     Pancreas: No mass.     Adrenal glands: No mass or nodule.     Kidneys: Patchy hypoenhancement of the right kidney with  edema/enlargement. There is mild urothelial enhancement. The left  kidney is normal.     Gastrointestinal tract: Normal caliber of the bowel.     Pelvis: Urinary bladder is normal. No pelvic mass.     Mesentery/peritoneum/retroperitoneum: No free fluid or air.    Lymph nodes: No significant lymphadenopathy.    Vasculature: No aneurysm of the abdominal aorta.     Soft tissues: Within normal limits.     Osseous structures: No aggressive or acute osseous lesion.       Impression    IMPRESSION:   1.  Patchy hypoenhancement and edema/enlargement of the right kidney,  similar compared to 6/4/2024, concerning for  persistent/recurrent  pyelonephritis versus other infiltrative process.  2.  No central pulmonary embolism.     I have personally reviewed the examination and initial interpretation  and I agree with the findings.    RICARDO BAXTER MD         SYSTEM ID:  [id number]   Comprehensive metabolic panel     Status: Abnormal   Result Value Ref Range    Sodium 137 135 - 145 mmol/L    Potassium 3.7 3.4 - 5.3 mmol/L    Carbon Dioxide (CO2) 25 22 - 29 mmol/L    Anion Gap 9 7 - 15 mmol/L    Urea Nitrogen 10.0 6.0 - 20.0 mg/dL    Creatinine 0.98 (H) 0.51 - 0.95 mg/dL    GFR Estimate 74 >60 mL/min/1.73m2    Calcium 9.5 8.6 - 10.0 mg/dL    Chloride 103 98 - 107 mmol/L    Glucose 117 (H) 70 - 99 mg/dL    Alkaline Phosphatase 61 40 - 150 U/L    AST 17 0 - 45 U/L    ALT 7 0 - 50 U/L    Protein Total 7.2 6.4 - 8.3 g/dL    Albumin 4.0 3.5 - 5.2 g/dL    Bilirubin Total 0.2 <=1.2 mg/dL   Lipase     Status: Normal   Result Value Ref Range    Lipase 27 13 - 60 U/L   UA with Microscopic reflex to Culture     Status: Abnormal    Specimen: Urine, Catheter   Result Value Ref Range    Color Urine Straw Colorless, Straw, Light Yellow, Yellow    Appearance Urine Clear Clear    Glucose Urine Negative Negative mg/dL    Bilirubin Urine Negative Negative    Ketones Urine Negative Negative mg/dL    Specific Gravity Urine 1.037 (H) 1.003 - 1.035    Blood Urine Negative Negative    pH Urine 6.5 5.0 - 7.0    Protein Albumin Urine Negative Negative mg/dL    Urobilinogen Urine Normal Normal, 2.0 mg/dL    Nitrite Urine Negative Negative    Leukocyte Esterase Urine Trace (A) Negative    RBC Urine <1 <=2 /HPF    WBC Urine 4 <=5 /HPF    Narrative    Urine Culture not indicated   Troponin T, High Sensitivity     Status: Normal   Result Value Ref Range    Troponin T, High Sensitivity <6 <=14 ng/L   Lactic acid whole blood     Status: Normal   Result Value Ref Range    Lactic Acid 0.9 0.7 - 2.0 mmol/L   HCG qualitative pregnancy (blood)     Status: Normal    Result Value Ref Range    hCG Serum Qualitative Negative Negative   CBC with platelets and differential     Status: Abnormal   Result Value Ref Range    WBC Count 6.5 4.0 - 11.0 10e3/uL    RBC Count 3.75 (L) 3.80 - 5.20 10e6/uL    Hemoglobin 10.9 (L) 11.7 - 15.7 g/dL    Hematocrit 33.9 (L) 35.0 - 47.0 %    MCV 90 78 - 100 fL    MCH 29.1 26.5 - 33.0 pg    MCHC 32.2 31.5 - 36.5 g/dL    RDW 13.2 10.0 - 15.0 %    Platelet Count 296 150 - 450 10e3/uL    % Neutrophils 52 %    % Lymphocytes 33 %    % Monocytes 10 %    % Eosinophils 5 %    % Basophils 0 %    % Immature Granulocytes 0 %    NRBCs per 100 WBC 0 <1 /100    Absolute Neutrophils 3.3 1.6 - 8.3 10e3/uL    Absolute Lymphocytes 2.2 0.8 - 5.3 10e3/uL    Absolute Monocytes 0.7 0.0 - 1.3 10e3/uL    Absolute Eosinophils 0.3 0.0 - 0.7 10e3/uL    Absolute Basophils 0.0 0.0 - 0.2 10e3/uL    Absolute Immature Granulocytes 0.0 <=0.4 10e3/uL    Absolute NRBCs 0.0 10e3/uL   EKG 12-lead, tracing only     Status: None   Result Value Ref Range    Systolic Blood Pressure  mmHg    Diastolic Blood Pressure  mmHg    Ventricular Rate 54 BPM    Atrial Rate 54 BPM    KS Interval 146 ms    QRS Duration 90 ms     ms    QTc 400 ms    P Axis 52 degrees    R AXIS -5 degrees    T Axis 34 degrees    Interpretation ECG       Sinus bradycardia with sinus arrhythmia  Low voltage QRS  Borderline ECG  Unconfirmed report - interpretation of this ECG is computer generated - see medical record for final interpretation  Confirmed by - EMERGENCY ROOM, PHYSICIAN ([id number]),  ERICA GÓMEZ ([id number]) on 6/27/2024 8:23:59 PM     EKG 12 lead     Status: None (Preliminary result)   Result Value Ref Range    Systolic Blood Pressure  mmHg    Diastolic Blood Pressure  mmHg    Ventricular Rate 45 BPM    Atrial Rate 45 BPM    KS Interval 156 ms    QRS Duration 78 ms     ms    QTc 420 ms    P Axis 27 degrees    R AXIS 21 degrees    T Axis 11 degrees    Interpretation ECG       Sinus  bradycardia  Septal infarct , age undetermined  Abnormal ECG     CBC with Platelets & Differential     Status: Abnormal    Narrative    The following orders were created for panel order CBC with Platelets & Differential.  Procedure                               Abnormality         Status                     ---------                               -----------         ------                     CBC with platelets and d...[[id number]]  Abnormal            Final result                 Please view results for these tests on the individual orders.     Medications   meropenem (MERREM) 1 g vial to attach to  mL bag (1 g Intravenous $New Bag 6/28/24 0027)   pharmacy alert - intermittent dosing (has no administration in time range)   sodium chloride 0.9% BOLUS 1,000 mL (0 mLs Intravenous Stopped 6/27/24 1814)   oxyCODONE (ROXICODONE) tablet 5 mg (5 mg Oral $Given 6/27/24 1726)   iopamidol (ISOVUE-370) solution 99 mL (99 mLs Intravenous $Given 6/27/24 1924)   sodium chloride (PF) 0.9% PF flush 90 mL (90 mLs Intravenous $Given 6/27/24 1923)   HYDROmorphone (PF) (DILAUDID) injection 0.5-1 mg (1 mg Intravenous $Given 6/27/24 2255)     Labs Ordered and Resulted from Time of ED Arrival to Time of ED Departure   COMPREHENSIVE METABOLIC PANEL - Abnormal       Result Value    Sodium 137      Potassium 3.7      Carbon Dioxide (CO2) 25      Anion Gap 9      Urea Nitrogen 10.0      Creatinine 0.98 (*)     GFR Estimate 74      Calcium 9.5      Chloride 103      Glucose 117 (*)     Alkaline Phosphatase 61      AST 17      ALT 7      Protein Total 7.2      Albumin 4.0      Bilirubin Total 0.2     ROUTINE UA WITH MICROSCOPIC REFLEX TO CULTURE - Abnormal    Color Urine Straw      Appearance Urine Clear      Glucose Urine Negative      Bilirubin Urine Negative      Ketones Urine Negative      Specific Gravity Urine 1.037 (*)     Blood Urine Negative      pH Urine 6.5      Protein Albumin Urine Negative      Urobilinogen Urine Normal       Nitrite Urine Negative      Leukocyte Esterase Urine Trace (*)     RBC Urine <1      WBC Urine 4     CBC WITH PLATELETS AND DIFFERENTIAL - Abnormal    WBC Count 6.5      RBC Count 3.75 (*)     Hemoglobin 10.9 (*)     Hematocrit 33.9 (*)     MCV 90      MCH 29.1      MCHC 32.2      RDW 13.2      Platelet Count 296      % Neutrophils 52      % Lymphocytes 33      % Monocytes 10      % Eosinophils 5      % Basophils 0      % Immature Granulocytes 0      NRBCs per 100 WBC 0      Absolute Neutrophils 3.3      Absolute Lymphocytes 2.2      Absolute Monocytes 0.7      Absolute Eosinophils 0.3      Absolute Basophils 0.0      Absolute Immature Granulocytes 0.0      Absolute NRBCs 0.0     LIPASE - Normal    Lipase 27     TROPONIN T, HIGH SENSITIVITY - Normal    Troponin T, High Sensitivity <6     LACTIC ACID WHOLE BLOOD - Normal    Lactic Acid 0.9     HCG QUALITATIVE PREGNANCY - Normal    hCG Serum Qualitative Negative     BLOOD CULTURE   BLOOD CULTURE   URINE CULTURE     CT Chest (PE) Abdomen Pelvis w Contrast   Final Result   IMPRESSION:    1.  Patchy hypoenhancement and edema/enlargement of the right kidney,   similar compared to 6/4/2024, concerning for persistent/recurrent   pyelonephritis versus other infiltrative process.   2.  No central pulmonary embolism.       I have personally reviewed the examination and initial interpretation   and I agree with the findings.      RICARDO BAXTER MD            SYSTEM ID:  [id number]             Critical care was not performed.     Medical Decision Making  The patient's presentation was of high complexity (an acute health issue posing potential threat to life or bodily function).    The patient's evaluation involved:  review of external note(s) from 1 sources (see separate area of note for details)  review of 3+ test result(s) ordered prior to this encounter (see separate area of note for details)  ordering and/or review of 3+ test(s) in this encounter (see separate area of note  for details)  discussion of management or test interpretation with another health professional (see separate area of note for details)    The patient's management necessitated high risk (a decision regarding hospitalization).    Assessment & Plan    Patient presents to the Emergency Department today for the above complaints.  Record was reviewed, she was recently in the hospital about 3 weeks ago for septic shock secondary to E. coli bacteremia and UTI as well as colitis.  Today she is fairly distressed, complaining of right sided low rib cage tenderness to palpation which has been going on for the past 4 days.  She is afebrile here in the emergency department.    Differential diagnosis is extensive, could include musculoskeletal pain though patient denies trauma or falls.  Pleurisy would be possible, pneumonia or PE would be possible given her recent hospitalization and some degree of immobilization that would come with recent illness.  Also need to consider intra-abdominal pathologies such as colitis, hepatitis, choledocholithiasis (she is status post cholecystectomy) cholangitis, colitis, pyelonephritis, intra-abdominal abscess, amongst others.    We did establish IV access and we did draw blood for laboratory analysis.  CBC shows a normal white count 6.5, hemoglobin 10.9, normal platelet count. CMP shows normal electrolytes, creatinine 0.98.  Lipase is 27 and troponin is negative.  Lactate is normal at 0.9.  hCG is negative.  Blood cultures are in process. CTA of chest and abdomen demonstrates no evidence of PE or acute pulmonary process, radiology does make note of mild groundglass opacities in the bases which are likely atelectasis.  In the abdomen there is patchy hypoenhancement of the right kidney with edema/enlargement with mild urothelial enhancement which is similar compared to recent CT from 6/4/2024, concerning for persistent or recurrent pyelonephritis versus other infiltrative process.  Patient is  significantly symptomatic with pain.  It took quite a bit of time to obtain urine from her.  Although a UA was ordered immediately upon arrival and evaluation in the Emergency Department, she did not provide a sample for many hours and ultimately we did need to straight cath for urine.  UA demonstrates trace LE, less than 1 red cell, 4 white cells.    Given persistent abnormal kidney findings on abdominal CT scan in the light of the recent hospital admission for sepsis, I believe it is reasonable to admit the patient to the hospital at this time.  We have added on a urine culture despite rather bland UA results, mainly based on symptoms, recent hospitalization for septic shock, and abnormal CT results.  She was given a dose of meropenem here in the ED.    Patient was given IV fluids and a dose of oxycodone here in the emergency department for her symptoms.    Patient will be admitted to the hospital at this time, discussed with hospitalist.    This part of the medical record was transcribed by Latosha Churchill, Medical Scribe, from a dictation done by Malathi Daigle MD.      I have reviewed the nursing notes. I have reviewed the findings, diagnosis, plan and need for follow up with the patient.    New Prescriptions    No medications on file       Final diagnoses:   Right upper quadrant pain   History of sepsis   Abnormal CT of the abdomen - R renal enlargement/hypoenhancement concerning for infection or infiltrative process       Malathi Daigle MD  [facility] EMERGENCY DEPARTMENT  6/27/2024     Radha Servin MD  06/28/24 0040

## 2024-06-27 NOTE — ED TRIAGE NOTES
Patient c/o pain to ruq abdomen and right ribcage pain X 3 days.      Triage Assessment (Adult)       Row Name 06/27/24 9756          Triage Assessment    Airway WDL WDL        Respiratory WDL    Respiratory WDL WDL        Peripheral/Neurovascular WDL    Peripheral Neurovascular WDL WDL        Cognitive/Neuro/Behavioral WDL    Cognitive/Neuro/Behavioral WDL WDL

## 2024-06-28 VITALS
TEMPERATURE: 98.1 F | RESPIRATION RATE: 20 BRPM | OXYGEN SATURATION: 98 % | HEIGHT: 59 IN | SYSTOLIC BLOOD PRESSURE: 109 MMHG | DIASTOLIC BLOOD PRESSURE: 66 MMHG | WEIGHT: 155 LBS | HEART RATE: 63 BPM | BODY MASS INDEX: 31.25 KG/M2

## 2024-06-28 PROBLEM — Z86.19 HISTORY OF SEPSIS: Status: ACTIVE | Noted: 2024-06-28

## 2024-06-28 PROBLEM — R93.5 ABNORMAL CT OF THE ABDOMEN: Status: ACTIVE | Noted: 2024-06-28

## 2024-06-28 PROBLEM — R10.11 RIGHT UPPER QUADRANT PAIN: Status: ACTIVE | Noted: 2024-06-28

## 2024-06-28 LAB
ALBUMIN UR-MCNC: NEGATIVE MG/DL
ANION GAP SERPL CALCULATED.3IONS-SCNC: 6 MMOL/L (ref 7–15)
APPEARANCE UR: CLEAR
ATRIAL RATE - MUSE: 45 BPM
BILIRUB UR QL STRIP: NEGATIVE
BUN SERPL-MCNC: 6.6 MG/DL (ref 6–20)
CALCIUM SERPL-MCNC: 8.8 MG/DL (ref 8.6–10)
CHLORIDE SERPL-SCNC: 107 MMOL/L (ref 98–107)
COLOR UR AUTO: ABNORMAL
CREAT SERPL-MCNC: 0.91 MG/DL (ref 0.51–0.95)
CRP SERPL-MCNC: 4.1 MG/L
CRP SERPL-MCNC: 4.44 MG/L
DEPRECATED HCO3 PLAS-SCNC: 24 MMOL/L (ref 22–29)
DIASTOLIC BLOOD PRESSURE - MUSE: NORMAL MMHG
EGFRCR SERPLBLD CKD-EPI 2021: 80 ML/MIN/1.73M2
ERYTHROCYTE [DISTWIDTH] IN BLOOD BY AUTOMATED COUNT: 13.2 % (ref 10–15)
GLUCOSE SERPL-MCNC: 96 MG/DL (ref 70–99)
GLUCOSE UR STRIP-MCNC: NEGATIVE MG/DL
HCT VFR BLD AUTO: 32.6 % (ref 35–47)
HGB BLD-MCNC: 10.8 G/DL (ref 11.7–15.7)
HGB UR QL STRIP: NEGATIVE
INTERPRETATION ECG - MUSE: NORMAL
KETONES UR STRIP-MCNC: NEGATIVE MG/DL
LEUKOCYTE ESTERASE UR QL STRIP: ABNORMAL
MCH RBC QN AUTO: 29.3 PG (ref 26.5–33)
MCHC RBC AUTO-ENTMCNC: 33.1 G/DL (ref 31.5–36.5)
MCV RBC AUTO: 89 FL (ref 78–100)
NITRATE UR QL: NEGATIVE
P AXIS - MUSE: 27 DEGREES
PH UR STRIP: 6.5 [PH] (ref 5–7)
PLATELET # BLD AUTO: 274 10E3/UL (ref 150–450)
POTASSIUM SERPL-SCNC: 3.9 MMOL/L (ref 3.4–5.3)
PR INTERVAL - MUSE: 156 MS
PROCALCITONIN SERPL IA-MCNC: 0.03 NG/ML
QRS DURATION - MUSE: 78 MS
QT - MUSE: 486 MS
QTC - MUSE: 420 MS
R AXIS - MUSE: 21 DEGREES
RBC # BLD AUTO: 3.68 10E6/UL (ref 3.8–5.2)
RBC URINE: <1 /HPF
SODIUM SERPL-SCNC: 137 MMOL/L (ref 135–145)
SP GR UR STRIP: 1.04 (ref 1–1.03)
SYSTOLIC BLOOD PRESSURE - MUSE: NORMAL MMHG
T AXIS - MUSE: 11 DEGREES
UROBILINOGEN UR STRIP-MCNC: NORMAL MG/DL
VENTRICULAR RATE- MUSE: 45 BPM
WBC # BLD AUTO: 7.9 10E3/UL (ref 4–11)
WBC URINE: 4 /HPF

## 2024-06-28 PROCEDURE — 80048 BASIC METABOLIC PNL TOTAL CA: CPT

## 2024-06-28 PROCEDURE — 36415 COLL VENOUS BLD VENIPUNCTURE: CPT

## 2024-06-28 PROCEDURE — 87040 BLOOD CULTURE FOR BACTERIA: CPT | Performed by: EMERGENCY MEDICINE

## 2024-06-28 PROCEDURE — 99223 1ST HOSP IP/OBS HIGH 75: CPT | Mod: AI | Performed by: PEDIATRICS

## 2024-06-28 PROCEDURE — 85027 COMPLETE CBC AUTOMATED: CPT

## 2024-06-28 PROCEDURE — 99221 1ST HOSP IP/OBS SF/LOW 40: CPT | Performed by: INTERNAL MEDICINE

## 2024-06-28 PROCEDURE — 120N000002 HC R&B MED SURG/OB UMMC

## 2024-06-28 PROCEDURE — 99207 PR APP CREDIT; MD BILLING SHARED VISIT: CPT | Mod: FS

## 2024-06-28 PROCEDURE — 250N000011 HC RX IP 250 OP 636

## 2024-06-28 PROCEDURE — 86140 C-REACTIVE PROTEIN: CPT | Performed by: PEDIATRICS

## 2024-06-28 PROCEDURE — 250N000011 HC RX IP 250 OP 636: Performed by: EMERGENCY MEDICINE

## 2024-06-28 PROCEDURE — 250N000013 HC RX MED GY IP 250 OP 250 PS 637

## 2024-06-28 PROCEDURE — 99239 HOSP IP/OBS DSCHRG MGMT >30: CPT | Mod: FS | Performed by: PEDIATRICS

## 2024-06-28 PROCEDURE — 36415 COLL VENOUS BLD VENIPUNCTURE: CPT | Performed by: EMERGENCY MEDICINE

## 2024-06-28 RX ORDER — CLONIDINE HYDROCHLORIDE 0.1 MG/1
TABLET ORAL
COMMUNITY
Start: 2024-05-17 | End: 2025-05-17

## 2024-06-28 RX ORDER — AMOXICILLIN 250 MG
2 CAPSULE ORAL 2 TIMES DAILY PRN
Status: DISCONTINUED | OUTPATIENT
Start: 2024-06-28 | End: 2024-06-28 | Stop reason: HOSPADM

## 2024-06-28 RX ORDER — POLYETHYLENE GLYCOL 3350 17 G/17G
17 POWDER, FOR SOLUTION ORAL 2 TIMES DAILY PRN
Status: DISCONTINUED | OUTPATIENT
Start: 2024-06-28 | End: 2024-06-28 | Stop reason: HOSPADM

## 2024-06-28 RX ORDER — NALOXONE HYDROCHLORIDE 0.4 MG/ML
0.4 INJECTION, SOLUTION INTRAMUSCULAR; INTRAVENOUS; SUBCUTANEOUS
Status: DISCONTINUED | OUTPATIENT
Start: 2024-06-28 | End: 2024-06-28 | Stop reason: HOSPADM

## 2024-06-28 RX ORDER — DICYCLOMINE HYDROCHLORIDE 10 MG/1
10 CAPSULE ORAL 4 TIMES DAILY
Status: DISCONTINUED | OUTPATIENT
Start: 2024-06-28 | End: 2024-06-28 | Stop reason: HOSPADM

## 2024-06-28 RX ORDER — OXYCODONE HYDROCHLORIDE 5 MG/1
5 TABLET ORAL EVERY 4 HOURS PRN
Status: DISCONTINUED | OUTPATIENT
Start: 2024-06-28 | End: 2024-06-28 | Stop reason: HOSPADM

## 2024-06-28 RX ORDER — CEFDINIR 300 MG/1
300 CAPSULE ORAL 2 TIMES DAILY
Qty: 14 CAPSULE | Refills: 0 | Status: SHIPPED | OUTPATIENT
Start: 2024-06-28

## 2024-06-28 RX ORDER — MEROPENEM 1 G/1
1 INJECTION, POWDER, FOR SOLUTION INTRAVENOUS ONCE
Status: COMPLETED | OUTPATIENT
Start: 2024-06-28 | End: 2024-06-28

## 2024-06-28 RX ORDER — BUPROPION HYDROCHLORIDE 150 MG/1
150 TABLET ORAL DAILY
Status: DISCONTINUED | OUTPATIENT
Start: 2024-06-28 | End: 2024-06-28 | Stop reason: HOSPADM

## 2024-06-28 RX ORDER — LIDOCAINE 40 MG/G
CREAM TOPICAL
Status: DISCONTINUED | OUTPATIENT
Start: 2024-06-28 | End: 2024-06-28 | Stop reason: HOSPADM

## 2024-06-28 RX ORDER — SIMETHICONE 80 MG
80 TABLET,CHEWABLE ORAL EVERY 6 HOURS PRN
Status: DISCONTINUED | OUTPATIENT
Start: 2024-06-28 | End: 2024-06-28 | Stop reason: HOSPADM

## 2024-06-28 RX ORDER — MEROPENEM 1 G/1
1 INJECTION, POWDER, FOR SOLUTION INTRAVENOUS EVERY 8 HOURS
Status: DISCONTINUED | OUTPATIENT
Start: 2024-06-28 | End: 2024-06-28

## 2024-06-28 RX ORDER — ACETAMINOPHEN 325 MG/1
650 TABLET ORAL EVERY 4 HOURS PRN
Status: DISCONTINUED | OUTPATIENT
Start: 2024-06-28 | End: 2024-06-28 | Stop reason: HOSPADM

## 2024-06-28 RX ORDER — ONDANSETRON 2 MG/ML
4 INJECTION INTRAMUSCULAR; INTRAVENOUS EVERY 6 HOURS PRN
Status: DISCONTINUED | OUTPATIENT
Start: 2024-06-28 | End: 2024-06-28 | Stop reason: HOSPADM

## 2024-06-28 RX ORDER — NALOXONE HYDROCHLORIDE 0.4 MG/ML
0.2 INJECTION, SOLUTION INTRAMUSCULAR; INTRAVENOUS; SUBCUTANEOUS
Status: DISCONTINUED | OUTPATIENT
Start: 2024-06-28 | End: 2024-06-28 | Stop reason: HOSPADM

## 2024-06-28 RX ORDER — MIRTAZAPINE 15 MG/1
30 TABLET, FILM COATED ORAL AT BEDTIME
Status: DISCONTINUED | OUTPATIENT
Start: 2024-06-28 | End: 2024-06-28 | Stop reason: HOSPADM

## 2024-06-28 RX ORDER — ALBUTEROL SULFATE 90 UG/1
2 AEROSOL, METERED RESPIRATORY (INHALATION) EVERY 6 HOURS PRN
Status: DISCONTINUED | OUTPATIENT
Start: 2024-06-28 | End: 2024-06-28 | Stop reason: HOSPADM

## 2024-06-28 RX ORDER — BISACODYL 10 MG
10 SUPPOSITORY, RECTAL RECTAL DAILY PRN
Status: DISCONTINUED | OUTPATIENT
Start: 2024-06-28 | End: 2024-06-28 | Stop reason: HOSPADM

## 2024-06-28 RX ORDER — HYDROMORPHONE HYDROCHLORIDE 1 MG/ML
0.5 INJECTION, SOLUTION INTRAMUSCULAR; INTRAVENOUS; SUBCUTANEOUS EVERY 4 HOURS PRN
Status: DISCONTINUED | OUTPATIENT
Start: 2024-06-28 | End: 2024-06-28 | Stop reason: HOSPADM

## 2024-06-28 RX ORDER — LORAZEPAM 1 MG/1
2 TABLET ORAL 3 TIMES DAILY PRN
Status: DISCONTINUED | OUTPATIENT
Start: 2024-06-28 | End: 2024-06-28 | Stop reason: HOSPADM

## 2024-06-28 RX ORDER — ONDANSETRON 4 MG/1
4 TABLET, ORALLY DISINTEGRATING ORAL EVERY 6 HOURS PRN
Status: DISCONTINUED | OUTPATIENT
Start: 2024-06-28 | End: 2024-06-28 | Stop reason: HOSPADM

## 2024-06-28 RX ORDER — AMOXICILLIN 250 MG
1 CAPSULE ORAL 2 TIMES DAILY PRN
Status: DISCONTINUED | OUTPATIENT
Start: 2024-06-28 | End: 2024-06-28 | Stop reason: HOSPADM

## 2024-06-28 RX ORDER — CALCIUM CARBONATE 500 MG/1
1000 TABLET, CHEWABLE ORAL 4 TIMES DAILY PRN
Status: DISCONTINUED | OUTPATIENT
Start: 2024-06-28 | End: 2024-06-28 | Stop reason: HOSPADM

## 2024-06-28 RX ORDER — ENOXAPARIN SODIUM 100 MG/ML
40 INJECTION SUBCUTANEOUS EVERY 24 HOURS
Status: DISCONTINUED | OUTPATIENT
Start: 2024-06-28 | End: 2024-06-28 | Stop reason: HOSPADM

## 2024-06-28 RX ORDER — ACETAMINOPHEN 650 MG/1
650 SUPPOSITORY RECTAL EVERY 4 HOURS PRN
Status: DISCONTINUED | OUTPATIENT
Start: 2024-06-28 | End: 2024-06-28 | Stop reason: HOSPADM

## 2024-06-28 RX ORDER — CEFTRIAXONE 2 G/1
2 INJECTION, POWDER, FOR SOLUTION INTRAMUSCULAR; INTRAVENOUS EVERY 24 HOURS
Status: DISCONTINUED | OUTPATIENT
Start: 2024-06-28 | End: 2024-06-28 | Stop reason: HOSPADM

## 2024-06-28 RX ADMIN — MEROPENEM 1 G: 1 INJECTION, POWDER, FOR SOLUTION INTRAVENOUS at 00:27

## 2024-06-28 RX ADMIN — ENOXAPARIN SODIUM 40 MG: 40 INJECTION SUBCUTANEOUS at 07:34

## 2024-06-28 RX ADMIN — HYDROMORPHONE HYDROCHLORIDE 0.5 MG: 1 INJECTION, SOLUTION INTRAMUSCULAR; INTRAVENOUS; SUBCUTANEOUS at 08:47

## 2024-06-28 RX ADMIN — BUPROPION HYDROCHLORIDE 150 MG: 150 TABLET, EXTENDED RELEASE ORAL at 07:34

## 2024-06-28 RX ADMIN — CEFTRIAXONE SODIUM 2 G: 2 INJECTION, POWDER, FOR SOLUTION INTRAMUSCULAR; INTRAVENOUS at 07:34

## 2024-06-28 RX ADMIN — HYDROMORPHONE HYDROCHLORIDE 0.5 MG: 1 INJECTION, SOLUTION INTRAMUSCULAR; INTRAVENOUS; SUBCUTANEOUS at 13:17

## 2024-06-28 RX ADMIN — DICYCLOMINE HYDROCHLORIDE 10 MG: 10 CAPSULE ORAL at 07:34

## 2024-06-28 RX ADMIN — HYDROMORPHONE HYDROCHLORIDE 0.5 MG: 1 INJECTION, SOLUTION INTRAMUSCULAR; INTRAVENOUS; SUBCUTANEOUS at 04:45

## 2024-06-28 RX ADMIN — DICYCLOMINE HYDROCHLORIDE 10 MG: 10 CAPSULE ORAL at 13:17

## 2024-06-28 ASSESSMENT — ACTIVITIES OF DAILY LIVING (ADL)
ADLS_ACUITY_SCORE: 38

## 2024-06-28 NOTE — H&P
[facility]    History and Physical - Medicine Service, MAROON TEAM        Date of Admission:  6/27/2024    Assessment & Plan      Alisha Crowe is a 43 year old female with PMH PMH recent E coli bacteremia, MDD, ADHD, PTSD, chronic pelvic pain (previously on chronic oxycodone), constipation, previous episodes of colitis  admitted on 6/27/2024 for RUQ c/f recurrent pyelonephritis.     # RUQ pain   # Right sided pyelonephritis   # Recent septic shock 2/2 E. coli bacteremia from UTI  Recent admission 6/1-6/8/24 for right sided pyelo and E coli UTI and bacteremia. E coli resistant to zosyn on urine culture (but not blood cx), was treated with meropenem. Discharged on levofloxacin and flagyl PO to complete 14 day course. Today presents with worsening right substernal/RUQ pain. No leukocytosis, afebrile, hemodynamically stable. UA with trace LE, 4 WBCs. CT AP shows edema/enlargement of right kidney c/f persistent/recurrent pyelonephritis vs other infiltrative process. Seems to be pretty similar from previous CT AP on 6/4. Will continue to treat as pyelo for now, await cultures and labs for further guidance. Pain may also be related to chronic pelvic pain and constipation.   - CRP, procal   - S/p meropenem x1 in ED   - Pharmacy recommends CTX, 6/28-p  - Blood cx x2, UCx in process  - Consider Nephrology consult for workup of other causes of infiltrative renal diseases      # Chronic pelvic pain   # Suspected pelvic floor dysfunction   # Suspected hemorrhoids   # History of unspecified colitis  Abdominal pain on admission may also be related to this ongoing issue. Reports that pain was previously more on left side of abdomen but this time on RUQ. Not entirely clear if she is having good bowel movements currently.   - PTA bentyl, simethicone PRN    - Miralax, sennaa PRN   - Preparation H, tucks pad  - Referred for GI and Pelvic floor PT outpatient      # MDD  # ADHD  #  "PTSD  - PTA ativan 2mg TID PRN   - PTA buproption   - PTA mirtazapine      # Normocytic anemia  Hgb stable 9-10 for the last month. No signs of acute bleeding. Likely due to recent critical illness.   - Monitor CBC      #Asthma  - PTA albuterol             Diet: Combination Diet Regular Diet Adult  DVT Prophylaxis: Enoxaparin (Lovenox) SQ  Eli Catheter: Not present  Fluids: None  Lines: None     Cardiac Monitoring: None  Code Status: Full Code    Clinically Significant Risk Factors Present on Admission                     # Anemia: based on hgb <11           # Obesity: Estimated body mass index is 31.84 kg/m  as calculated from the following:    Height as of this encounter: 1.486 m (4' 10.5\").    Weight as of this encounter: 70.3 kg (155 lb).              Disposition Plan      Expected Discharge Date: 06/30/2024              To be formally staffed in the AM.       A. Carmela Mae MD  Medicine Service, [facility]  Securely message with PATHEOS (more info)  Text page via [facility] Paging/Directory   See signed in provider for up to date coverage information  ______________________________________________________________________    Chief Complaint   Abdominal pain     History is obtained from the patient    History of Present Illness   Alisha Crowe is a 43 year old female with PMH recent E coli bacteremia, MDD, ADHD, PTSD, chronic pelvic pain (previously on chronic oxycodone), constipation, previous episodes of colitis who presents with RUQ abdominal pain.     She is quite sleepy and provides limited history on my exam. Says she has pain that is in her RUQ/below her sternum and wraps around the right side which is different from previous hospitalization when pain was more on the left side. Has been going on for the last few days. Unable to state if any changes to BM. Per ED note was taking ibuprofen for pain. No fevers/chills, nausea, vomiting, dysuria. "     Recent admission 6/1-6/8/24 for E coli UTI/pyelo/bacteremia. Completed 14 day couse of abx, reports taking all of the abx.      ED course:  - afebrile, VSS  - WBC 6.5, UA with trace LE, 4 WBCs  - CT AP: edema/enlargement of right kidney, pyelo vs other infiltrative process   - Received meropenem       Past Medical History    History reviewed. No pertinent past medical history.    Past Surgical History   No past surgical history on file.    Prior to Admission Medications   Prior to Admission Medications   Prescriptions Last Dose Informant Patient Reported? Taking?   LORazepam (ATIVAN) 2 MG tablet   No No   Sig: Take 1 tablet (2 mg) by mouth 3 times daily as needed for anxiety   acetaminophen (TYLENOL) 325 MG tablet   No No   Sig: Take 3 tablets (975 mg) by mouth every 8 hours as needed for mild pain, fever or headaches   albuterol (PROAIR HFA/PROVENTIL HFA/VENTOLIN HFA) 108 (90 Base) MCG/ACT inhaler   No No   Sig: Inhale 2 puffs into the lungs every 6 hours as needed for wheezing   buPROPion (WELLBUTRIN XL) 150 MG 24 hr tablet   No No   Sig: Take 1 tablet (150 mg) by mouth daily for 45 days   dicyclomine (BENTYL) 10 MG capsule   No No   Sig: Take 1 capsule (10 mg) by mouth 4 times daily for 45 days   ibuprofen (ADVIL/MOTRIN) 200 MG tablet   No No   Sig: Take 1-2 tablets (200-400 mg) by mouth every 6 hours as needed for inflammatory pain or moderate pain Take the lowest dose as able as your kidney function recovers   mirtazapine (REMERON) 30 MG tablet   No No   Sig: Take 1 tablet (30 mg) by mouth at bedtime for 45 days   polyethylene glycol (MIRALAX) 17 GM/Dose powder   No No   Sig: Take 17 g (1 Capful) by mouth daily Continue daily Maintenance Bowel Regimen to treat and prevent further constipation:- Start with Miralax 1 capful per day to titrate to 1-2 soft bowel movements - Move to Miralax 2-3 capfuls daily, titrated to promote 1-2 soft, continent, easy to evacuate bowel movements daily (minimum 3 bowel  movements weekly). - If no bowel movement after 3 days, recommend increasing Miralax 2 additional capfuls and reach out to your primary care provider.   pramox-pe-glycerin-petrolatum (PREPARATION H) 1-0.25-14.4-15 % CREA cream   No No   Sig: Place rectally 3 times daily   simethicone (MYLICON) 80 MG chewable tablet   No No   Sig: Take 1 tablet (80 mg) by mouth every 6 hours as needed for cramping   witch hazel-glycerin (TUCKS) pad   No No   Sig: Apply topically every hour as needed for hemorrhoids      Facility-Administered Medications: None           Physical Exam   Vital Signs: Temp: 98  F (36.7  C) Temp src: Oral BP: 104/70 Pulse: 66   Resp: 20 SpO2: 98 % O2 Device: None (Room air)    Weight: 155 lbs 0 oz    Gen: NAD, mildley uncomfortable, drowsy   HEENT: NC/AT, EOMI  CV: RRR, no m/r/g   Resp: CTAB in anterior lung fields, breathing comfortably on RA  Abd: Soft, RUQ tenderness, non-distended  Ext: WWP, no edema  Skin: No erythema, no lesions or rashes on exposed skin   Neuro: Awake, alert, moving all extremities spontaneously, CN grossly intact      Medical Decision Making       Please see A&P for additional details of medical decision making.      Data   ------------------------- PAST 24 HR DATA REVIEWED -----------------------------------------------    I have personally reviewed the following data over the past 24 hrs:    6.5  \   10.9 (L)   / 296     137 103 10.0 /  117 (H)   3.7 25 0.98 (H) \     ALT: 7 AST: 17 AP: 61 TBILI: 0.2   ALB: 4.0 TOT PROTEIN: 7.2 LIPASE: 27     Trop: <6 BNP: N/A     Procal: N/A CRP: N/A Lactic Acid: 0.9         Imaging results reviewed over the past 24 hrs:   Recent Results (from the past 24 hour(s))   CT Chest (PE) Abdomen Pelvis w Contrast    Narrative    EXAMINATION: CT CHEST PE ABDOMEN PELVIS W CONTRAST, 6/27/2024 7:49 PM    INDICATION: severe R sided chest/rib/abdominal pain; recent hx of  septic shock/pyelonephritis, eval for PE, pneumonia, or acute  intraabdominal  process    COMPARISON STUDY: CT abdomen and pelvis 6/4/2024    TECHNIQUE: CT scan of the chest, abdomen and pelvis was performed on  multidetector CT scanner using volumetric acquisition technique and  images were reconstructed in multiple planes with variable thickness  and reviewed on dedicated workstations.     CONTRAST: [id number].     CT scan radiation dose is optimized to minimum requisite dose using  automated dose modulation techniques.    FINDINGS:    Chest:   Mediastinum: No thyroid nodules. Cardiac size is within normal limits.  No filling defect in the pulmonary arteries. Reflux of contrast into  the IVC and hepatic veins.     Pleura: No pleural effusion or pneumothorax.     Lungs: Central tracheobronchial tree is patent. No consolidation. Mild  ground glass opacities in the lung bases, likely atelectasis.    Abdomen and Pelvis:  Liver: No mass. No intrahepatic biliary ductal dilation.     Biliary System: Gallbladder surgically absent.    Spleen: Variable enhancement which may be due to contrast timing.     Pancreas: No mass.     Adrenal glands: No mass or nodule.     Kidneys: Patchy hypoenhancement of the right kidney with  edema/enlargement. There is mild urothelial enhancement. The left  kidney is normal.     Gastrointestinal tract: Normal caliber of the bowel.     Pelvis: Urinary bladder is normal. No pelvic mass.     Mesentery/peritoneum/retroperitoneum: No free fluid or air.    Lymph nodes: No significant lymphadenopathy.    Vasculature: No aneurysm of the abdominal aorta.     Soft tissues: Within normal limits.     Osseous structures: No aggressive or acute osseous lesion.       Impression    IMPRESSION:   1.  Patchy hypoenhancement and edema/enlargement of the right kidney,  similar compared to 6/4/2024, concerning for persistent/recurrent  pyelonephritis versus other infiltrative process.  2.  No central pulmonary embolism.     I have personally reviewed the examination and initial  interpretation  and I agree with the findings.    RICARDO BAXTER MD         SYSTEM ID:  [id number]

## 2024-06-28 NOTE — UTILIZATION REVIEW
"  Admission Status; Secondary Review Determination         Under the authority of the Utilization Management Committee, the utilization review process indicated a secondary review on the above patient.  The review outcome is based on review of the medical records, discussions with staff, and applying clinical experience noted on the date of the review.        (xxx)      Inpatient Status Appropriate - This patient's medical care is consistent with medical management for inpatient care and reasonable inpatient medical practice.      () Observation Status Appropriate - This patient does not meet hospital inpatient criteria and is placed in observation status. If this patient's primary payer is Medicare and was admitted as an inpatient, Condition Code 44 should be used and patient status changed to \"observation\".   () Admission Status NOT Appropriate - This patient's medical care is not consistent with medical management for Inpatient or Observation Status.          RATIONALE FOR DETERMINATION     Alisha Crowe is a 43 year old female with PMH of recent E coli bacteremia and pyelonephritis, MDD, ADHD, PTSD, chronic pelvic pain (previously on chronic oxycodone), constipation, and previous episodes of colitis who was admitted on 6/27/2024 for RUQ c/f recurrent pyelonephritis.  CT AP shows edema/enlargement of right kidney c/f persistent/recurrent pyelonephritis vs other infiltrative process.  She is admitted for close clinical monitoring, IVF, IV antibiotics, IV pain medication, consultation and further evaluation.    The severity of illness, intensity of service provided, expected LOS and risk for adverse outcome make the care complex, high risk and appropriate for hospital admission.        The information on this document is developed by the utilization review team in order for the business office to ensure compliance.  This only denotes the appropriateness of proper admission status and does not reflect the quality " of care rendered.         The definitions of Inpatient Status and Observation Status used in making the determination above are those provided in the CMS Coverage Manual, Chapter 1 and Chapter 6, section 70.4.      Sincerely,     Lory Jefferson MD  Physician Advisor   Utilization Review/ Case Management  [facility].

## 2024-06-28 NOTE — DISCHARGE SUMMARY
"Pt expressed wanting to leave AMA to RN. RN notified NILDA who came to talk with pt. Pt still decided to leave AMA. Pt filled out AMA paperwork and was walked out to front door. Pt left on foot with belongings.     /66 (BP Location: Right arm)   Pulse 63   Temp 98.1  F (36.7  C) (Oral)   Resp 20   Ht 1.486 m (4' 10.5\")   Wt 70.3 kg (155 lb)   SpO2 98%   BMI 31.84 kg/m      "

## 2024-06-28 NOTE — MEDICATION SCRIBE - ADMISSION MEDICATION HISTORY
Medication Scribe Admission Medication History    Admission medication history is complete. The information provided in this note is only as accurate as the sources available at the time of the update.    Information Source(s): Patient and CareEverywhere/SureScripts via in-person    Pertinent Information: Patient's preferred pharmacy is [facility] pharmacy- [facility], [address] [place] [address] [address], [place] [address]. I could not find on when searched in the pharmacy list therefore could not add it to her chart. She also stated some of the meds on the PTA list including Miralax are recent prescriptions so she had not taken them at home PTA.    Changes made to PTA medication list:  Added: cloNIDine (CATAPRES) 0.1 MG  Deleted: acetaminophen 325 mg (not taking).  Changed: None    Allergies reviewed with patient and updates made in EHR: yes    Medication History Completed By: Ana Tavarez 6/28/2024 1:56 PM    PTA Med List   Medication Sig Last Dose    albuterol (PROAIR HFA/PROVENTIL HFA/VENTOLIN HFA) 108 (90 Base) MCG/ACT inhaler Inhale 2 puffs into the lungs every 6 hours as needed for wheezing 6/25/2024 at AM    cloNIDine (CATAPRES) 0.1 MG tablet Take 1 Tablet (0.1 mg) by mouth two times daily as needed (anxiety). Past Week at few days ago    dicyclomine (BENTYL) 10 MG capsule Take 1 capsule (10 mg) by mouth 4 times daily for 45 days 6/27/2024 at AM    ibuprofen (ADVIL/MOTRIN) 200 MG tablet Take 1-2 tablets (200-400 mg) by mouth every 6 hours as needed for inflammatory pain or moderate pain Take the lowest dose as able as your kidney function recovers Unknown at Unknown    LORazepam (ATIVAN) 2 MG tablet Take 1 tablet (2 mg) by mouth 3 times daily as needed for anxiety 6/27/2024 at PM    mirtazapine (REMERON) 30 MG tablet Take 1 tablet (30 mg) by mouth at bedtime for 45 days 6/26/2024 at PM    simethicone (MYLICON) 80 MG chewable tablet Take 1 tablet (80 mg) by mouth every 6 hours as needed for cramping Unknown at  Unknown

## 2024-06-28 NOTE — CONSULTS
Nephrology Initial Consult  June 28, 2024      Alisha Crowe MRN:[id number] YOB: 1980  Date of Admission:6/27/2024  Primary care provider: Clinic, [facility] [place]  Requesting physician: Lynda att. providers found    ASSESSMENT AND RECOMMENDATIONS:   The nature of the pain that she describes is not consistent with pyelonephritis nor her clinical picture or urinary findings.  She definitely does not have any CVA tenderness and her urine analysis is not suggestive of any infection.  Her clinical picture is rather suggestive of a musculoskeletal pain or a pleuritic/pericardietic kind of pain.  Will defer further management of that to the primary team.    The noted abnormalities on her CT scan does not explain her current presentation.  They had been present before and continues to be persistent but again clinical picture is  not consistent with pyelonephritis.  She has some uretral enhancement though no positive urinary finding but it might be worth considering referral to urology for further workup if indicated. She denies passing any stones.    Management of her pain is better primary team.    Recommendations were communicated to primary team via verbally and this note        CRYSTAL KURTZ MD   Division of Renal Disease and Hypertension  [facility]  [email or web address]  Vocera Web Console      REASON FOR CONSULT: abnormal CT findings, r/o pylonephritis    HISTORY OF PRESENT ILLNESS:  Admitting provider and nursing notes reviewed  Alisha Crowe is a 43 year old who presents to the emergency with epigastric pain.  She describes her pain as starting in the epigastric area and radiating over her right rib.  The pain started acutely 5 days ago and it is worse with movement.  It also worsens with taking a deep breath.  She denies any trauma to the site or any strenuous exercise.  She denies any back pain.  She denies any urinary symptoms or hematuria.  She denies any fevers or chills.  Her weight has been  stable and she denies any weight loss or night sweats.  She does not have any skin rash.  She does not have any previous history of UTI or any sick small cats at home.    PAST MEDICAL HISTORY:  Reviewed with patient on 06/28/2024     History reviewed. No pertinent past medical history.    No past surgical history on file.     MEDICATIONS:  PTA Meds  Prior to Admission medications    Medication Sig Last Dose Taking? Auth Provider Long Term End Date   albuterol (PROAIR HFA/PROVENTIL HFA/VENTOLIN HFA) 108 (90 Base) MCG/ACT inhaler Inhale 2 puffs into the lungs every 6 hours as needed for wheezing 6/25/2024 at AM Yes Ella Alex APRN CNP Yes    cefdinir (OMNICEF) 300 MG capsule Take 1 capsule (300 mg) by mouth 2 times daily  Yes Ruthann Vital NP     cloNIDine (CATAPRES) 0.1 MG tablet Take 1 Tablet (0.1 mg) by mouth two times daily as needed (anxiety). Past Week at few days ago Yes Reported, Patient No 5/17/25   dicyclomine (BENTYL) 10 MG capsule Take 1 capsule (10 mg) by mouth 4 times daily for 45 days 6/27/2024 at AM Yes Ella Alex APRN CNP  7/22/24   ibuprofen (ADVIL/MOTRIN) 200 MG tablet Take 1-2 tablets (200-400 mg) by mouth every 6 hours as needed for inflammatory pain or moderate pain Take the lowest dose as able as your kidney function recovers Unknown at Unknown Yes Ella Alex APRN CNP     LORazepam (ATIVAN) 2 MG tablet Take 1 tablet (2 mg) by mouth 3 times daily as needed for anxiety 6/27/2024 at PM Yes Ella Alex APRN CNP  12/28/29   mirtazapine (REMERON) 30 MG tablet Take 1 tablet (30 mg) by mouth at bedtime for 45 days 6/26/2024 at PM Yes Ella Alex APRN CNP Yes 7/22/24   simethicone (MYLICON) 80 MG chewable tablet Take 1 tablet (80 mg) by mouth every 6 hours as needed for cramping Unknown at Unknown Yes Ella Alex APRN CNP     buPROPion (WELLBUTRIN XL) 150 MG 24 hr tablet Take 1 tablet (150 mg) by mouth daily for 45 days   Ella Alex APRN CNP Yes  7/23/24   polyethylene glycol (MIRALAX) 17 GM/Dose powder Take 17 g (1 Capful) by mouth daily Continue daily Maintenance Bowel Regimen to treat and prevent further constipation:- Start with Miralax 1 capful per day to titrate to 1-2 soft bowel movements - Move to Miralax 2-3 capfuls daily, titrated to promote 1-2 soft, continent, easy to evacuate bowel movements daily (minimum 3 bowel movements weekly). - If no bowel movement after 3 days, recommend increasing Miralax 2 additional capfuls and reach out to your primary care provider.   Ella Alex APRN CNP     pramox-pe-glycerin-petrolatum (PREPARATION H) 1-0.25-14.4-15 % CREA cream Place rectally 3 times daily   Ella Alex APRN CNP     witch hazel-glycerin (TUCKS) pad Apply topically every hour as needed for hemorrhoids   Ella Alex APRN CNP        Current Meds  Current Facility-Administered Medications   Medication Dose Route Frequency Provider Last Rate Last Admin    buPROPion (WELLBUTRIN XL) 24 hr tablet 150 mg  150 mg Oral Daily Renay Mae MD   150 mg at 06/28/24 0734    cefTRIAXone (ROCEPHIN) 2 g vial to attach to  ml bag for ADULTS or NS 50 ml bag for PEDS  2 g Intravenous Q24H Renay Mae MD   Stopped at 06/28/24 0816    dicyclomine (BENTYL) capsule 10 mg  10 mg Oral 4x Daily Renay Mae MD   10 mg at 06/28/24 1317    enoxaparin ANTICOAGULANT (LOVENOX) injection 40 mg  40 mg Subcutaneous Q24H Renay Mae MD   40 mg at 06/28/24 0734    mirtazapine (REMERON) tablet 30 mg  30 mg Oral At Bedtime Renay Mae MD        sodium chloride (PF) 0.9% PF flush 3 mL  3 mL Intracatheter Q8H Renay Mae MD   3 mL at 06/28/24 1317     Infusion Meds  Current Facility-Administered Medications   Medication Dose Route Frequency Provider Last Rate Last Admin       ALLERGIES:    Allergies   Allergen Reactions    Codeine Hives    Meperidine Hives    Ciprofloxacin Headache    Hydrocodone-Acetaminophen Nausea and Vomiting     "Nalbuphine Nausea and Vomiting    Tylenol [Acetaminophen] Nausea and Vomiting       REVIEW OF SYSTEMS:  A comprehensive of systems was negative except as noted above.    SOCIAL HISTORY:   Social History     Socioeconomic History    Marital status: Single     Spouse name: Not on file    Number of children: Not on file    Years of education: Not on file    Highest education level: Not on file   Occupational History    Not on file   Tobacco Use    Smoking status: Not on file    Smokeless tobacco: Not on file   Substance and Sexual Activity    Alcohol use: Not on file    Drug use: Not on file    Sexual activity: Not on file   Other Topics Concern    Not on file   Social History Narrative    Not on file     Social Determinants of Health     Financial Resource Strain: Not on file   Food Insecurity: Not on file   Transportation Needs: Not on file   Physical Activity: Not on file   Stress: Not on file   Social Connections: Not on file   Interpersonal Safety: Not on file   Housing Stability: Not on file     Reviewed with patient   None accompanies Niharikatravis EMMETT Crowe in hospital room    FAMILY MEDICAL HISTORY:   No family history on file.  Unknown Family history of kidney disease  Reviewed with patient     PHYSICAL EXAM:   Temp  Av  F (36.7  C)  Min: 97.8  F (36.6  C)  Max: 98.1  F (36.7  C)      Pulse  Av.6  Min: 55  Max: 82 Resp  Av.6  Min: 18  Max: 20  SpO2  Av.5 %  Min: 97 %  Max: 100 %       /66 (BP Location: Right arm)   Pulse 63   Temp 98.1  F (36.7  C) (Oral)   Resp 20   Ht 1.486 m (4' 10.5\")   Wt 70.3 kg (155 lb)   SpO2 98%   BMI 31.84 kg/m     Date 24 07 - 24 0659(Discharged)   Shift 8114-3290 0053-5040 3407-5006 24 Hour Total   INTAKE   IV Piggyback 100   100   Shift Total(mL/kg) 100(1.42)   100(1.42)   OUTPUT   Shift Total(mL/kg)       Weight (kg) 70.31 70.31 70.31 70.31      Admit Weight: 70.3 kg (155 lb)     GENERAL APPEARANCE: in pain and distress,  awake  EYES: no " scleral icterus, pupils equal  Lymphatics: no cervical or supraclavicular LAD  Pulmonary: lungs clear to auscultation with equal breath sounds bilaterally, no clubbing  CV: regular rhythm, normal rate, no rub   - JVD no   - Edema no  GI: soft, nontender, normal bowel sounds  MS: no evidence of inflammation in joints, no muscle tenderness  : no shen  SKIN: no rash, warm, dry, no cyanosis  NEURO: face symmetric, no asterixis     LABS:   I have reviewed the following labs:  CMP  Recent Labs   Lab 06/28/24  0648 06/27/24  1723    137   POTASSIUM 3.9 3.7   CHLORIDE 107 103   CO2 24 25   ANIONGAP 6* 9   GLC 96 117*   BUN 6.6 10.0   CR 0.91 0.98*   GFRESTIMATED 80 74   JADE 8.8 9.5   PROTTOTAL  --  7.2   ALBUMIN  --  4.0   BILITOTAL  --  0.2   ALKPHOS  --  61   AST  --  17   ALT  --  7     CBC  Recent Labs   Lab 06/28/24  0648 06/27/24  1723   HGB 10.8* 10.9*   WBC 7.9 6.5   RBC 3.68* 3.75*   HCT 32.6* 33.9*   MCV 89 90   MCH 29.3 29.1   MCHC 33.1 32.2   RDW 13.2 13.2    296     INRNo lab results found in last 7 days.  ABGNo lab results found in last 7 days.   URINE STUDIES  Recent Labs   Lab Test 06/27/24  2335 06/06/24  2349 06/01/24  1458   COLOR Straw Light Yellow Yellow   APPEARANCE Clear Clear Slightly Cloudy*   URINEGLC Negative Negative Negative   URINEBILI Negative Negative Negative   URINEKETONE Negative Negative Negative   SG 1.037* 1.007 1.011   UBLD Negative Small* Moderate*   URINEPH 6.5 7.5* 6.0   PROTEIN Negative Negative 100*   NITRITE Negative Negative Negative   LEUKEST Trace* Trace* Large*   RBCU <1 4* 16*   WBCU 4 18* 63*     No lab results found.  PTH  No lab results found.  IRON STUDIES  No lab results found.    IMAGING:  All imaging studies reviewed by me.     CRYSTAL KURTZ MD

## 2024-06-28 NOTE — PROGRESS NOTES
[facility]    Medicine Progress Note - Hospitalist Service, GOLD TEAM 6    Date of Admission:  6/27/2024    Assessment & Plan   Alisha Crowe is a 43 year old female with PMH PMH recent E coli bacteremia, MDD, ADHD, PTSD, chronic pelvic pain (previously on chronic oxycodone), constipation, previous episodes of colitis  admitted on 6/27/2024 for RUQ c/f recurrent pyelonephritis.     Updates Today:  - continue to follow cultures   - nephrology consult     RUQ pain with c/f pyelonephritis   Recent septic shock 2/2 E. Coli bacteremia from UTI   Recent admission 6/1-6/8/24 for right sided pyelo and E coli UTI and bacteremia. E coli resistant to zosyn on urine culture (but not blood cx), was treated with meropenem. Discharged on levofloxacin and flagyl PO to complete 14 day course. Today presents with worsening right substernal/RUQ pain. No leukocytosis, afebrile, hemodynamically stable. UA with trace LE, 4 WBCs. CT AP shows edema/enlargement of right kidney c/f persistent/recurrent pyelonephritis vs other infiltrative process; similar to previous CT AP on 6/4. Started on meropenem overnight but switched to ceftriaxone. CRP 4.10 (prior 24.90 6/8/24 during previous admission).   - obtain procal   - blood cultures 6/28: in process   - urine cultures 6/27: in process   - antibiotics: ceftriaxone 2g q24h   - nephrology consulted, appreciate assistance     Chronic pelvic pain  Suspected pelvic floor dysfunction  Suspected hemorrhoid   Hx of unspecified colitis   Constipation   Abdominal pain on admission could be related to this issue. Reports pain previously more on the left side of her abdomen but this time is more on the RUQ. Last BM 6/25 or 6/26.   - continue PTA bentyl and simethicone   - continue Miralax and senna PRN, preparation H, and tucks pads   - referrals made for GI and pelvic floor PT as OP     Chronic/Stable Medical Conditions:   MDD, ADHD, PTSD: continue PTA  "ativan 2 mg TID PRN, mirtazapine 30 mg at bedtime, and bupropion 150 mg daily  Normocytic anemia: Hgb stable 9-10 over last month, no signs of bleeding, likely d/t recent critical illness  Asthma: continue PTA albuterol     Discharge planning:  - GI referral placed  - pelvic floor PT referral placed           Diet: Combination Diet Regular Diet Adult    DVT Prophylaxis: Enoxaparin (Lovenox) SQ  Eli Catheter: Not present  Lines: None     Cardiac Monitoring: None  Code Status: Full Code      Clinically Significant Risk Factors Present on Admission                     # Anemia: based on hgb <11           # Obesity: Estimated body mass index is 31.84 kg/m  as calculated from the following:    Height as of this encounter: 1.486 m (4' 10.5\").    Weight as of this encounter: 70.3 kg (155 lb).              Disposition Plan     Medically Ready for Discharge: Anticipated in 2-4 Days       The patient's care was discussed with the Attending Physician, Dr. Devlin and Patient.    Ruthann Vital NP  Hospitalist Service, [address]  Securely message with [employer] (more info)  Text page via [facility] Paging/Directory   See signed in provider for up to date coverage information  ______________________________________________________________________    Interval History   Alisha was seen sleeping in her room, easy to arouse. Still experiencing pain to her RUQ that wraps around to her flank. She says this is the same pain she experienced during her last hospitalization. Denies urinary symptoms. Denies having a bowel movement x2 days. Confirms hx of constipation and says this does not feel like constipation-type pain.     Physical Exam   Vital Signs: Temp: 98.1  F (36.7  C) Temp src: Oral BP: 101/58 Pulse: 62   Resp: 20 SpO2: 97 % O2 Device: None (Room air)    Weight: 155 lbs 0 oz    GENERAL: Alert and awake. Answering questions appropriately. Oriented x 3. Appears in pain. " Pleasant and conversational   HEENT: Anicteric sclera. EOMI. Mucous membranes moist   CARDIOVASCULAR: RRR. S1, S2. No murmurs, rubs, or gallops.   RESPIRATORY: Effort normal on RA. Clear to auscultation bilaterally, no rales, rhonchi or wheezes  GI: Abdomen soft, tenderness to RUQ; abdomen without rebound or guarding, hypoactive bowel sounds present  MUSCULOSKELETAL: No joint swelling or tenderness. Moves all extremities.   EXTREMITIES: No peripheral edema. No calf asymmetry, erythema, or tenderness.   NEUROLOGICAL: No focal deficits. Moving all extremities symmetrically.   SKIN: Intact. Warm and dry. No jaundice. No rashes on exposed skin   PSYCH: Affect appropriate     Medical Decision Making       60 MINUTES SPENT BY ME on the date of service doing chart review, history, exam, documentation & further activities per the note.      Data   Imaging results reviewed over the past 24 hrs:   Recent Results (from the past 24 hour(s))   CT Chest (PE) Abdomen Pelvis w Contrast    Narrative    EXAMINATION: CT CHEST PE ABDOMEN PELVIS W CONTRAST, 6/27/2024 7:49 PM    INDICATION: severe R sided chest/rib/abdominal pain; recent hx of  septic shock/pyelonephritis, eval for PE, pneumonia, or acute  intraabdominal process    COMPARISON STUDY: CT abdomen and pelvis 6/4/2024    TECHNIQUE: CT scan of the chest, abdomen and pelvis was performed on  multidetector CT scanner using volumetric acquisition technique and  images were reconstructed in multiple planes with variable thickness  and reviewed on dedicated workstations.     CONTRAST: [id number].     CT scan radiation dose is optimized to minimum requisite dose using  automated dose modulation techniques.    FINDINGS:    Chest:   Mediastinum: No thyroid nodules. Cardiac size is within normal limits.  No filling defect in the pulmonary arteries. Reflux of contrast into  the IVC and hepatic veins.     Pleura: No pleural effusion or pneumothorax.     Lungs: Central  tracheobronchial tree is patent. No consolidation. Mild  ground glass opacities in the lung bases, likely atelectasis.    Abdomen and Pelvis:  Liver: No mass. No intrahepatic biliary ductal dilation.     Biliary System: Gallbladder surgically absent.    Spleen: Variable enhancement which may be due to contrast timing.     Pancreas: No mass.     Adrenal glands: No mass or nodule.     Kidneys: Patchy hypoenhancement of the right kidney with  edema/enlargement. There is mild urothelial enhancement. The left  kidney is normal.     Gastrointestinal tract: Normal caliber of the bowel.     Pelvis: Urinary bladder is normal. No pelvic mass.     Mesentery/peritoneum/retroperitoneum: No free fluid or air.    Lymph nodes: No significant lymphadenopathy.    Vasculature: No aneurysm of the abdominal aorta.     Soft tissues: Within normal limits.     Osseous structures: No aggressive or acute osseous lesion.       Impression    IMPRESSION:   1.  Patchy hypoenhancement and edema/enlargement of the right kidney,  similar compared to 6/4/2024, concerning for persistent/recurrent  pyelonephritis versus other infiltrative process.  2.  No central pulmonary embolism.     I have personally reviewed the examination and initial interpretation  and I agree with the findings.    RICARDO BAXTER MD         SYSTEM ID:  [id number]     Recent Labs   Lab 06/28/24  0648 06/27/24  1723   WBC 7.9 6.5   HGB 10.8* 10.9*   MCV 89 90    296    137   POTASSIUM 3.9 3.7   CHLORIDE 107 103   CO2 24 25   BUN 6.6 10.0   CR 0.91 0.98*   ANIONGAP 6* 9   JADE 8.8 9.5   GLC 96 117*   ALBUMIN  --  4.0   PROTTOTAL  --  7.2   BILITOTAL  --  0.2   ALKPHOS  --  61   ALT  --  7   AST  --  17   LIPASE  --  27

## 2024-06-28 NOTE — DISCHARGE SUMMARY
"[facility]  Hospitalist Discharge Summary      Date of Admission:  6/27/2024  Date of Discharge:  6/28/2024; AMA Discharge   Discharging Provider: Ruthann Vital NP  Discharge Service: Hospitalist Service, GOLD TEAM 6    Discharge Diagnoses   RUQ pain with c/f pyelonephritis   Recent septic shock 2/2 E. Coli bacteremia from UTI   Chronic pelvic pain  Suspected pelvic floor dysfunction  Suspected hemorrhoid   Hx of unspecified colitis   Constipation    MDD  ADHD  PTSD  Normocytic anemia  Asthma    Clinically Significant Risk Factors     # Obesity: Estimated body mass index is 31.84 kg/m  as calculated from the following:    Height as of this encounter: 1.486 m (4' 10.5\").    Weight as of this encounter: 70.3 kg (155 lb).       Follow-ups Needed After Discharge   Please see PCP within at least 7 days from AMA discharge.     Unresulted Labs Ordered in the Past 30 Days of this Admission       Date and Time Order Name Status Description    6/28/2024  5:41 AM Procalcitonin In process     6/28/2024  5:20 AM CRP inflammation In process     6/28/2024 12:35 AM Urine Culture In process     6/27/2024 11:49 PM Blood Culture Peripheral Blood Preliminary     6/27/2024 11:49 PM Blood Culture Peripheral Blood Preliminary         These results will be followed up by PCP    Discharge Disposition   Discharged to home  Condition at discharge: Fair    Hospital Course   Encounter for AMA:    Situation: Alisha expressed desire to leave the hospital.  I considered this to be leaving against medical advice. I personally discussed that they currently had a medical condition of: RUQ with concern for pyelonephritis and I am concerned for further development of infection, increased pain.     My proposed course of evaluation and treatment and that of any consultants is: speaking with urology, working with nephrology, following cultures, tailoring antibiotics to culture results, and adequate pain " control.      Benefits would include: possible diagnosis or excluding of UTI, pyelonephritis or an alternative serious condition such as infiltrative disease of kidney, untreated infection, which if identified early would lead to appropriate intervention in a timely manner lessing the burden of disability and death.    Risks of leaving before this had been completed include: misdiagnosis, worsening illness leading up to and including prolonged or permanent disability or death.     Despite this they stated they wanted to leave due to providers not being able to determine cause of her symptoms and refused further evaluation, treatment, or admission at this time.    They appear clinically sober, to be mentating appropriately, free from distracting injury, have controlled pain, appear to have intact insight, judgement, and reason and in my opinion have the capacity to make this decision.    Specifically, they were able to verbally state back in their own words and in a coherent manner their current medical condition/current diagnosis, the proposes course of treatment, and the risks, benefits, and alternatives of treatment versus leaving against medical advice. They understand that they may return to seek medical attention here at whatever time they want. I advised them to return to the Emergency Department immediately if they experienced any: further pain or change to pain characteristics, chills/fever, drowsiness/dizziness, change to urinary symptoms, reconsidered treatment and/or admission, or had any other concerns. This would, of course, be without any repercussions.    I also recommended they follow-up with PCP as soon as possible, ideally within 24 hours for further evaluation and treatment.    Assessment: Patient is competent, not a danger to self or others, and is not on an emergency hold.    Patient Response: Discharge AMA      Consultations This Hospital Stay   NEPHROLOGY GENERAL ADULT IP CONSULT    Code  Status   Full Code    Time Spent on this Encounter   I, Ruthann Vital NP, personally saw the patient today and spent less than or equal to 30 minutes discharging this patient.       Ruthann Vital NP  [facility] EMERGENCY DEPARTMENT  [address]  [place] [address]  Phone: [phone]  ______________________________________________________________________    Physical Exam   Vital Signs: Temp: 98.1  F (36.7  C) Temp src: Oral BP: 109/66 Pulse: 63   Resp: 20 SpO2: 98 % O2 Device: None (Room air)    Weight: 155 lbs 0 oz    Visual exam allowed only at this time. See progress note from earlier today for complete physical exam.     GENERAL: Alert and awake. Answering questions appropriately. Oriented x 3. Appears in pain.  CARDIOVASCULAR: Appears well perfused   RESPIRATORY: Effort normal on RA.   MUSCULOSKELETAL: Moves all extremities.   NEUROLOGICAL:  Moving all extremities symmetrically.          Primary Care Physician   [facility]    Discharge Orders   No discharge procedures on file.    Significant Results and Procedures   Most Recent 3 CBC's:  Recent Labs   Lab Test 06/28/24  0648 06/27/24  1723 06/08/24  0549 06/07/24  0608   WBC 7.9 6.5 12.2* 11.4*   HGB 10.8* 10.9* 10.7* 9.4*   MCV 89 90 89 90    296  --  277     Most Recent 3 BMP's:  Recent Labs   Lab Test 06/28/24  0648 06/27/24  1723 06/08/24  0549    137 139   POTASSIUM 3.9 3.7 3.3*   CHLORIDE 107 103 106   CO2 24 25 24   BUN 6.6 10.0 6.6   CR 0.91 0.98* 1.00*   ANIONGAP 6* 9 9   JADE 8.8 9.5 8.9   GLC 96 117* 110*     7-Day Micro Results       Collected Updated Procedure Result Status      06/28/2024 0014 06/28/2024 1231 Blood Culture Peripheral Blood [[id number]]   Peripheral Blood    Preliminary result Component Value   Culture No growth after 12 hours  [P]                06/28/2024 0014 06/28/2024 1231 Blood Culture Peripheral Blood [[id number]]   Peripheral Blood    Preliminary result  Component Value   Culture No growth after 12 hours  [P]                06/27/2024 2335 06/28/2024 0056 Urine Culture [[id number]]   Urine, Catheter    In process Component Value   No component results                     Most Recent Urinalysis:  Recent Labs   Lab Test 06/27/24  2335   COLOR Straw   APPEARANCE Clear   URINEGLC Negative   URINEBILI Negative   URINEKETONE Negative   SG 1.037*   UBLD Negative   URINEPH 6.5   PROTEIN Negative   NITRITE Negative   LEUKEST Trace*   RBCU <1   WBCU 4   ,   Results for orders placed or performed during the hospital encounter of 06/27/24   CT Chest (PE) Abdomen Pelvis w Contrast    Narrative    EXAMINATION: CT CHEST PE ABDOMEN PELVIS W CONTRAST, 6/27/2024 7:49 PM    INDICATION: severe R sided chest/rib/abdominal pain; recent hx of  septic shock/pyelonephritis, eval for PE, pneumonia, or acute  intraabdominal process    COMPARISON STUDY: CT abdomen and pelvis 6/4/2024    TECHNIQUE: CT scan of the chest, abdomen and pelvis was performed on  multidetector CT scanner using volumetric acquisition technique and  images were reconstructed in multiple planes with variable thickness  and reviewed on dedicated workstations.     CONTRAST: [id number]:99mL.     CT scan radiation dose is optimized to minimum requisite dose using  automated dose modulation techniques.    FINDINGS:    Chest:   Mediastinum: No thyroid nodules. Cardiac size is within normal limits.  No filling defect in the pulmonary arteries. Reflux of contrast into  the IVC and hepatic veins.     Pleura: No pleural effusion or pneumothorax.     Lungs: Central tracheobronchial tree is patent. No consolidation. Mild  ground glass opacities in the lung bases, likely atelectasis.    Abdomen and Pelvis:  Liver: No mass. No intrahepatic biliary ductal dilation.     Biliary System: Gallbladder surgically absent.    Spleen: Variable enhancement which may be due to contrast timing.     Pancreas: No mass.     Adrenal glands: No mass  or nodule.     Kidneys: Patchy hypoenhancement of the right kidney with  edema/enlargement. There is mild urothelial enhancement. The left  kidney is normal.     Gastrointestinal tract: Normal caliber of the bowel.     Pelvis: Urinary bladder is normal. No pelvic mass.     Mesentery/peritoneum/retroperitoneum: No free fluid or air.    Lymph nodes: No significant lymphadenopathy.    Vasculature: No aneurysm of the abdominal aorta.     Soft tissues: Within normal limits.     Osseous structures: No aggressive or acute osseous lesion.       Impression    IMPRESSION:   1.  Patchy hypoenhancement and edema/enlargement of the right kidney,  similar compared to 6/4/2024, concerning for persistent/recurrent  pyelonephritis versus other infiltrative process.  2.  No central pulmonary embolism.     I have personally reviewed the examination and initial interpretation  and I agree with the findings.    RICARDO BAXTER MD         SYSTEM ID:  [id number]       Discharge Medications   Current Discharge Medication List        START taking these medications    Details   cefdinir (OMNICEF) 300 MG capsule Take 1 capsule (300 mg) by mouth 2 times daily  Qty: 14 capsule, Refills: 0    Associated Diagnoses: Right upper quadrant pain           CONTINUE these medications which have NOT CHANGED    Details   albuterol (PROAIR HFA/PROVENTIL HFA/VENTOLIN HFA) 108 (90 Base) MCG/ACT inhaler Inhale 2 puffs into the lungs every 6 hours as needed for wheezing  Qty: 18 g, Refills: 0    Comments: Pharmacy may dispense brand covered by insurance (Proair, or proventil or ventolin or generic albuterol inhaler)  Associated Diagnoses: Mild intermittent asthma without complication      cloNIDine (CATAPRES) 0.1 MG tablet Take 1 Tablet (0.1 mg) by mouth two times daily as needed (anxiety).      dicyclomine (BENTYL) 10 MG capsule Take 1 capsule (10 mg) by mouth 4 times daily for 45 days  Qty: 180 capsule, Refills: 0    Associated Diagnoses: Chronic abdominal  pain      ibuprofen (ADVIL/MOTRIN) 200 MG tablet Take 1-2 tablets (200-400 mg) by mouth every 6 hours as needed for inflammatory pain or moderate pain Take the lowest dose as able as your kidney function recovers    Associated Diagnoses: Upper urinary tract infection      LORazepam (ATIVAN) 2 MG tablet Take 1 tablet (2 mg) by mouth 3 times daily as needed for anxiety  Qty: 60 tablet, Refills: 0    Associated Diagnoses: Anxiety      mirtazapine (REMERON) 30 MG tablet Take 1 tablet (30 mg) by mouth at bedtime for 45 days  Qty: 45 tablet, Refills: 0    Associated Diagnoses: Depression, unspecified depression type      simethicone (MYLICON) 80 MG chewable tablet Take 1 tablet (80 mg) by mouth every 6 hours as needed for cramping    Associated Diagnoses: Abdominal pain, generalized      buPROPion (WELLBUTRIN XL) 150 MG 24 hr tablet Take 1 tablet (150 mg) by mouth daily for 45 days  Qty: 45 tablet, Refills: 0    Associated Diagnoses: Depression, unspecified depression type      polyethylene glycol (MIRALAX) 17 GM/Dose powder Take 17 g (1 Capful) by mouth daily Continue daily Maintenance Bowel Regimen to treat and prevent further constipation:- Start with Miralax 1 capful per day to titrate to 1-2 soft bowel movements - Move to Miralax 2-3 capfuls daily, titrated to promote 1-2 soft, continent, easy to evacuate bowel movements daily (minimum 3 bowel movements weekly). - If no bowel movement after 3 days, recommend increasing Miralax 2 additional capfuls and reach out to your primary care provider.  Qty: 17 g, Refills: 0    Associated Diagnoses: Chronic constipation      pramox-pe-glycerin-petrolatum (PREPARATION H) 1-0.25-14.4-15 % CREA cream Place rectally 3 times daily  Qty: 1092 g, Refills: 0    Associated Diagnoses: External hemorrhoids      witch hazel-glycerin (TUCKS) pad Apply topically every hour as needed for hemorrhoids  Refills: 0    Associated Diagnoses: External hemorrhoids           Allergies   Allergies    Allergen Reactions    Codeine Hives    Meperidine Hives    Ciprofloxacin Headache    Hydrocodone-Acetaminophen Nausea and Vomiting    Nalbuphine Nausea and Vomiting    Tylenol [Acetaminophen] Nausea and Vomiting

## 2024-06-29 LAB — BACTERIA UR CULT: NO GROWTH

## 2024-07-03 LAB
BACTERIA BLD CULT: NO GROWTH
BACTERIA BLD CULT: NO GROWTH

## 2025-07-12 ENCOUNTER — HEALTH MAINTENANCE LETTER (OUTPATIENT)
Age: 45
End: 2025-07-12